# Patient Record
Sex: FEMALE | Race: WHITE | NOT HISPANIC OR LATINO | Employment: FULL TIME | URBAN - METROPOLITAN AREA
[De-identification: names, ages, dates, MRNs, and addresses within clinical notes are randomized per-mention and may not be internally consistent; named-entity substitution may affect disease eponyms.]

---

## 2018-04-18 ENCOUNTER — OFFICE VISIT (OUTPATIENT)
Dept: GASTROENTEROLOGY | Facility: CLINIC | Age: 28
End: 2018-04-18

## 2018-04-18 VITALS
HEIGHT: 67 IN | BODY MASS INDEX: 18.83 KG/M2 | WEIGHT: 120 LBS | SYSTOLIC BLOOD PRESSURE: 100 MMHG | DIASTOLIC BLOOD PRESSURE: 60 MMHG | TEMPERATURE: 98.2 F

## 2018-04-18 DIAGNOSIS — R14.0 ABDOMINAL BLOATING: ICD-10-CM

## 2018-04-18 DIAGNOSIS — R14.3 EXCESSIVE GAS: ICD-10-CM

## 2018-04-18 DIAGNOSIS — K59.00 CONSTIPATION, UNSPECIFIED CONSTIPATION TYPE: Primary | ICD-10-CM

## 2018-04-18 PROCEDURE — 99214 OFFICE O/P EST MOD 30 MIN: CPT | Performed by: NURSE PRACTITIONER

## 2018-04-18 RX ORDER — ALBUTEROL SULFATE 90 UG/1
2 AEROSOL, METERED RESPIRATORY (INHALATION)
COMMUNITY

## 2018-04-18 RX ORDER — DROSPIRENONE AND ETHINYL ESTRADIOL TABLETS 0.02-3(28)
KIT ORAL
COMMUNITY
Start: 2018-04-17 | End: 2021-06-09 | Stop reason: ALTCHOICE

## 2018-04-18 RX ORDER — CLINDAMYCIN PHOSPHATE 10 MG/G
GEL TOPICAL
COMMUNITY
Start: 2018-02-16 | End: 2019-10-10

## 2018-04-18 NOTE — PROGRESS NOTES
Chief Complaint   Patient presents with   • Irritable Bowel Syndrome   • Bloated   • Constipation       Jose Wadsworth is a  27 y.o. female here for a follow up visit for bloating and constipation.     HPI  28 yo f presents today for follow up visit for constipation, abd bloating and excessive gas. She is a patient of Dr. Viveros. She was last seen in the office on 4/2016 with the same issues. At that time she was advised to continue the miralax PRN and add more fiber and water. Patient admits the miralax is not working well. She is not taking any fiber supplementation. She is drinking more water. She feels that her diet is causing all her issues. She was doing better a few months ago but then all of her symptoms returned. She has thought about food allergy testing. She is having a BM daily with the miralax but she doesn't feel like shes 100% because she is still having a lot of bloating and gas. She denies any dysphagia, reflux, abd pain, N&V, rectal bleeding or melena. She admits her appetite is good and she has gained about 8 lbs since her last appt.     Past Medical History:   Diagnosis Date   • Allergic rhinitis    • Asthma    • Chronic constipation    • Vitamin B 12 deficiency    • Vitamin D deficiency        Past Surgical History:   Procedure Laterality Date   • APPENDECTOMY N/A 02/05/2016    Dr. Duong Johnson   • DENTAL PROCEDURE      x 2   • LASIK     • LEEP      LEEP PROCEDURE       Scheduled Meds:    Continuous Infusions:  No current facility-administered medications for this visit.     PRN Meds:.    Allergies   Allergen Reactions   • Metronidazole GI Intolerance   • Neomycin Rash   • Nickel Rash   • Other Rash     DISPERSE DYES       Social History     Social History   • Marital status: Single     Spouse name: N/A   • Number of children: N/A   • Years of education: N/A     Occupational History   • Not on file.     Social History Main Topics   • Smoking status: Never Smoker   • Smokeless tobacco:  Never Used   • Alcohol use Yes      Comment: WEEKENDS AND HOLIDAYS   • Drug use: No   • Sexual activity: Not on file     Other Topics Concern   • Not on file     Social History Narrative   • No narrative on file       Family History   Problem Relation Age of Onset   • Gallbladder disease Mother    • Crohn's disease Father        Review of Systems   Constitutional: Positive for unexpected weight change. Negative for appetite change, chills, diaphoresis, fatigue and fever.   HENT: Negative for nosebleeds, postnasal drip, sore throat, trouble swallowing and voice change.    Respiratory: Negative for cough, choking, chest tightness, shortness of breath and wheezing.    Cardiovascular: Negative for chest pain.   Gastrointestinal: Positive for abdominal distention and constipation. Negative for abdominal pain, anal bleeding, blood in stool, diarrhea, nausea, rectal pain and vomiting.   Endocrine: Negative for polydipsia, polyphagia and polyuria.   Musculoskeletal: Negative for gait problem.   Skin: Negative for rash and wound.   Allergic/Immunologic: Negative for food allergies.   Neurological: Negative for dizziness, speech difficulty and light-headedness.   Psychiatric/Behavioral: Negative for confusion, self-injury, sleep disturbance and suicidal ideas.       Vitals:    04/18/18 1444   BP: 100/60   Temp: 98.2 °F (36.8 °C)       Physical Exam   Constitutional: She is oriented to person, place, and time. She appears well-developed and well-nourished. She does not appear ill. No distress.   HENT:   Head: Normocephalic.   Eyes: Pupils are equal, round, and reactive to light.   Cardiovascular: Normal rate, regular rhythm and normal heart sounds.    Pulmonary/Chest: Effort normal and breath sounds normal.   Abdominal: Soft. Bowel sounds are normal. She exhibits no distension and no mass. There is no hepatosplenomegaly. There is no tenderness. There is no rebound and no guarding. No hernia.   Musculoskeletal: Normal range of  motion.   Neurological: She is alert and oriented to person, place, and time.   Skin: Skin is warm and dry.   Psychiatric: She has a normal mood and affect. Her speech is normal and behavior is normal. Judgment normal.       No images are attached to the encounter.    Assessment & Plan    1. Constipation, unspecified constipation type    2. Abdominal bloating    3. Excessive gas    I recommend she get food allergy tested. Continue the miralax but slowly add the fiber supplementation. Can keep a food journal also. Will give her samples of IBGARD/FDGARD to see if those offer any relief in her symptoms. Follow up with Dr. Viveros in 3 months. Call office in 2-3 weeks with update.

## 2018-05-10 ENCOUNTER — TELEPHONE (OUTPATIENT)
Dept: GASTROENTEROLOGY | Facility: CLINIC | Age: 28
End: 2018-05-10

## 2018-05-10 NOTE — TELEPHONE ENCOUNTER
"Call to pt. States is giving condition update as instructed with o/v of 4/18.  Stopped IBGard because has gelatin in it, and pt is vegetarian. Has been taking Miralax, fiber, and probiotic as instructed and not noting much change.    States has BM daily, but goes in small amounts and does not feel that completely empties. Occasional diarrhea since above regimen initiated.  Frustrated because \"really bloated all the time\".    Advise pt that M Pepper out of office  - will send update.  Pt verb understanding.  "

## 2018-05-10 NOTE — TELEPHONE ENCOUNTER
----- Message from Phuong De Los Santos sent at 5/10/2018 10:06 AM EDT -----  Regarding: FIORELLA  Contact: [phone]  PT CALLED STATED SOMEWHAT BETTER..

## 2018-05-11 NOTE — TELEPHONE ENCOUNTER
Call to pt.  Advise per M Pepper that if taking Miralax daily and still not satisfied with results, can increase the Miralax BID or try some Linzess 72 mcg daily or every other day instead of the miralax and see if that helps better.    Pt verb understanding and states will try Linzess.  Advise that 2 boxes at  to trial - update this office with response.  Verb understanding.

## 2018-05-11 NOTE — TELEPHONE ENCOUNTER
I would make sure the miralax is daily and so is the fiber and probiotic. Make sure she is drinking a lot of water and exercising daily to help move her bowels. If her bowels get regular and she is still bloating and gassy then we can always check her for SIBO if she hasnt been recently. Thanks.

## 2018-05-11 NOTE — TELEPHONE ENCOUNTER
"Call to pt.  Advise per M Pepper to make sure that taking Miralax, fiber and probiotic daily.  Also drinking a lot of water and exercising daily to help move bowels.  If bowels get regular and still bloating and gassy, can also check for SIBO.    Pt verb understanding.  States is indeed taking Miralax, fiber and probiotic daily as well as exercise and water.      Frustrated because has been dealing with this \"for months\", and has been doing all of the above (recently added Miralax)  Is passing stool, but in small amounts and does not feel that empties.  Persistent bloating.  Asking how to manage symptoms.      Question to M Pepper.  "

## 2018-05-11 NOTE — TELEPHONE ENCOUNTER
If she is taking miralax daily and still not satisfied with the results she can increase the MIRALAX to BID or try some linzess 72 mcg daily or every other day instead of the miralax and see if that helps her better. Thanks.

## 2018-05-17 ENCOUNTER — TELEPHONE (OUTPATIENT)
Dept: GASTROENTEROLOGY | Facility: CLINIC | Age: 28
End: 2018-05-17

## 2018-05-17 NOTE — TELEPHONE ENCOUNTER
Called pt and pt reports that she has been having loads of issues with bloating and constipation.  She states she took her first dose of linzess then later developed watery diarrhea .  Pt states she did not take the capsule she took the medication out of the tablet ( pt is vegetarian and does not take the capsule due to it is made out of gelatin).  Pt reports she followed the direction on the medication and mixed it with water.  She states she later went for a walk and had blood in her underwear and when she wiped after her next bm she states she saw bright red blood mixed with stool.  She states she feels fine.  Pt is asking what can she do or take.  Advised pt to hold linzess for now and in the meantime will send message to Althea VILLAVICENCIO.  Advised pt if her symptoms worsen to seek medical attn. Pt verb understanding and reports she is feeling fine.

## 2018-05-17 NOTE — TELEPHONE ENCOUNTER
OK I would have her stop the linzess and let her body rest from the Linzess side effects.     My guess is the Linzess 72 was just too strong for her and caused irritation and bleeding. I would have her drinking lots of water and wait a few days before trying something else.     I would have her try the trulance. You can give her samples. Its works more like the miralax but stronger. It tends to be gentler than the linzess     the Linzess 72 is the lowest and most gentle of the 3 Linzess doses.     Have her call us Monday with update and we can give her some samples of Trulance to try maybe starting Monday and see how she does. Thanks.

## 2018-05-17 NOTE — TELEPHONE ENCOUNTER
Called pt and advised per Althea to stop the linzess and let her body rest from the side effects of the linzess.     Her guess is the linzess 72 was just too strong for her and caused irritation and bleeding. She wants her to drink lots of water and wait a few days before trying something else.      She would have her try trulance .  It works more like miralax but stronger .  It tends to be gentler than the linzess.  Advised pt to call Monday with an update and we will have 2 boxes of samples at the  for her. Pt verb understanding.

## 2018-05-17 NOTE — TELEPHONE ENCOUNTER
----- Message from Phuong De Los Santos sent at 5/17/2018  2:52 PM EDT -----  Regarding: linzess   Contact: [phone]  PT CALLED COMPLAIN OF PASSING BLOOD

## 2018-05-18 ENCOUNTER — HOSPITAL ENCOUNTER (EMERGENCY)
Facility: HOSPITAL | Age: 28
Discharge: HOME | End: 2018-05-18
Attending: EMERGENCY MEDICINE | Admitting: EMERGENCY MEDICINE

## 2018-05-18 ENCOUNTER — APPOINTMENT (OUTPATIENT)
Dept: GENERAL RADIOLOGY | Facility: HOSPITAL | Age: 28
End: 2018-05-18

## 2018-05-18 VITALS
DIASTOLIC BLOOD PRESSURE: 96 MMHG | WEIGHT: 117 LBS | HEIGHT: 67 IN | OXYGEN SATURATION: 97 % | RESPIRATION RATE: 18 BRPM | BODY MASS INDEX: 18.36 KG/M2 | HEART RATE: 79 BPM | SYSTOLIC BLOOD PRESSURE: 140 MMHG | TEMPERATURE: 98.7 F

## 2018-05-18 DIAGNOSIS — S20.312A ABRASION OF LEFT CHEST WALL, INITIAL ENCOUNTER: ICD-10-CM
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
## 2018-05-18 PROCEDURE — 99283 EMERGENCY DEPT VISIT LOW MDM: CPT

## 2018-05-18 PROCEDURE — 71101 X-RAY EXAM UNILAT RIBS/CHEST: CPT

## 2018-05-18 RX ORDER — SACCHAROMYCES BOULARDII 250 MG
250 CAPSULE ORAL 2 TIMES DAILY
COMMUNITY
End: 2020-01-22
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
## 2018-05-21 ENCOUNTER — TELEPHONE (OUTPATIENT)
Dept: SOCIAL WORK | Facility: HOSPITAL | Age: 28
End: 2018-05-21

## 2018-05-21 ENCOUNTER — TELEPHONE (OUTPATIENT)
Dept: GASTROENTEROLOGY | Facility: CLINIC | Age: 28
End: 2018-05-21

## 2018-05-21 NOTE — TELEPHONE ENCOUNTER
----- Message from Patricia Teixeira sent at 5/21/2018  3:10 PM EDT -----  Regarding: pt called with update   Contact: [phone]  Merna told pt to stop taking the linzess & the bleeding has stopped.

## 2018-05-21 NOTE — TELEPHONE ENCOUNTER
ED f/u phone call; states she is taking Advil and using ice for pain w/ some relief; has already followed up w/ PCP. No questions/concerns

## 2018-05-21 NOTE — TELEPHONE ENCOUNTER
Called pt and pt reports that the rectal bleeding has stopped.  She has not had a chance to  the trulance yet.  Advised would update Althea VILLAVICENCIO.

## 2018-05-25 ENCOUNTER — TELEPHONE (OUTPATIENT)
Dept: GASTROENTEROLOGY | Facility: CLINIC | Age: 28
End: 2018-05-25

## 2018-05-25 NOTE — TELEPHONE ENCOUNTER
Call to pt.   Advise per M Pepper that should be ok to take all of them together.  Can always double check with pharmacist.  PT verb understanding.

## 2018-05-25 NOTE — TELEPHONE ENCOUNTER
Yes it should be ok to take all of them together. She can always double check with her pharmacist. Thanks.

## 2018-05-25 NOTE — TELEPHONE ENCOUNTER
Call to pt. States has been taking Trulance.  Recently started on Cyclobenzaprine and prescription strength Ibuprofen.  Asking if ok to take these with Trulance.    Advise to check with Pharmacist - pt requests to also check with JIM Lorenzana

## 2018-05-25 NOTE — TELEPHONE ENCOUNTER
----- Message from Patricia Teixeira sent at 5/24/2018  3:40 PM EDT -----  Regarding: PT CALLED WITH MEDICATION QUESTIONS   Contact: [phone]  ....

## 2018-07-23 ENCOUNTER — OFFICE VISIT (OUTPATIENT)
Dept: GASTROENTEROLOGY | Facility: CLINIC | Age: 28
End: 2018-07-23

## 2018-07-23 VITALS
WEIGHT: 109.4 LBS | HEIGHT: 67 IN | BODY MASS INDEX: 17.17 KG/M2 | SYSTOLIC BLOOD PRESSURE: 112 MMHG | TEMPERATURE: 97.8 F | DIASTOLIC BLOOD PRESSURE: 70 MMHG

## 2018-07-23 DIAGNOSIS — R14.0 BLOATING: ICD-10-CM

## 2018-07-23 DIAGNOSIS — R10.13 DYSPEPSIA: Primary | ICD-10-CM

## 2018-07-23 LAB
ALBUMIN SERPL-MCNC: 4.3 G/DL (ref 3.5–5.2)
ALBUMIN/GLOB SERPL: 2 G/DL
ALP SERPL-CCNC: 62 U/L (ref 39–117)
ALT SERPL-CCNC: 15 U/L (ref 1–33)
AST SERPL-CCNC: 16 U/L (ref 1–32)
BASOPHILS # BLD AUTO: 0.05 10*3/MM3 (ref 0–0.2)
BASOPHILS NFR BLD AUTO: 0.7 % (ref 0–1.5)
BILIRUB SERPL-MCNC: 0.2 MG/DL (ref 0.1–1.2)
BUN SERPL-MCNC: 7 MG/DL (ref 6–20)
BUN/CREAT SERPL: 9.2 (ref 7–25)
CALCIUM SERPL-MCNC: 9.6 MG/DL (ref 8.6–10.5)
CHLORIDE SERPL-SCNC: 105 MMOL/L (ref 98–107)
CO2 SERPL-SCNC: 21.7 MMOL/L (ref 22–29)
CREAT SERPL-MCNC: 0.76 MG/DL (ref 0.57–1)
EOSINOPHIL # BLD AUTO: 0.14 10*3/MM3 (ref 0–0.7)
EOSINOPHIL NFR BLD AUTO: 2 % (ref 0.3–6.2)
ERYTHROCYTE [DISTWIDTH] IN BLOOD BY AUTOMATED COUNT: 13.4 % (ref 11.7–13)
GLOBULIN SER CALC-MCNC: 2.1 GM/DL
GLUCOSE SERPL-MCNC: 87 MG/DL (ref 65–99)
HCT VFR BLD AUTO: 40.8 % (ref 35.6–45.5)
HGB BLD-MCNC: 13.1 G/DL (ref 11.9–15.5)
IMM GRANULOCYTES # BLD: 0 10*3/MM3 (ref 0–0.03)
IMM GRANULOCYTES NFR BLD: 0 % (ref 0–0.5)
LIPASE SERPL-CCNC: 54 U/L (ref 13–60)
LYMPHOCYTES # BLD AUTO: 2.39 10*3/MM3 (ref 0.9–4.8)
LYMPHOCYTES NFR BLD AUTO: 34.6 % (ref 19.6–45.3)
MCH RBC QN AUTO: 32.5 PG (ref 26.9–32)
MCHC RBC AUTO-ENTMCNC: 32.1 G/DL (ref 32.4–36.3)
MCV RBC AUTO: 101.2 FL (ref 80.5–98.2)
MONOCYTES # BLD AUTO: 0.5 10*3/MM3 (ref 0.2–1.2)
MONOCYTES NFR BLD AUTO: 7.2 % (ref 5–12)
NEUTROPHILS # BLD AUTO: 3.82 10*3/MM3 (ref 1.9–8.1)
NEUTROPHILS NFR BLD AUTO: 55.5 % (ref 42.7–76)
PLATELET # BLD AUTO: 263 10*3/MM3 (ref 140–500)
POTASSIUM SERPL-SCNC: 3.9 MMOL/L (ref 3.5–5.2)
PROT SERPL-MCNC: 6.4 G/DL (ref 6–8.5)
RBC # BLD AUTO: 4.03 10*6/MM3 (ref 3.9–5.2)
SODIUM SERPL-SCNC: 144 MMOL/L (ref 136–145)
WBC # BLD AUTO: 6.9 10*3/MM3 (ref 4.5–10.7)

## 2018-07-23 PROCEDURE — 99214 OFFICE O/P EST MOD 30 MIN: CPT | Performed by: INTERNAL MEDICINE

## 2018-07-23 RX ORDER — OMEPRAZOLE 40 MG/1
40 CAPSULE, DELAYED RELEASE ORAL DAILY
Qty: 30 CAPSULE | Refills: 2 | Status: SHIPPED | OUTPATIENT
Start: 2018-07-23 | End: 2018-12-03 | Stop reason: SDUPTHER

## 2018-07-23 NOTE — PROGRESS NOTES
Chief Complaint   Patient presents with   • Bloated       Jose Wadsworth is a  27 y.o. female here for a follow up visit for abdominal bloating. She does not feel any better.  She tried linzess - it caused diarrhea and seepage.  She tried IBgard and FDgard and she thought she was no better.  She has regular BMs but sense of incomplete evacuation. She also tried trulance and this gave her indigestion.   She feels bloated and this is causing her significant amount of distress.  HPI  Past Medical History:   Diagnosis Date   • Allergic rhinitis    • Asthma    • Chronic constipation    • Vitamin B 12 deficiency    • Vitamin D deficiency      Past Surgical History:   Procedure Laterality Date   • APPENDECTOMY N/A 02/05/2016    Dr. Duong Johnson   • DENTAL PROCEDURE      x 2   • LASIK     • LEEP      LEEP PROCEDURE       Current Outpatient Prescriptions:   •  albuterol (PROVENTIL HFA;VENTOLIN HFA) 108 (90 Base) MCG/ACT inhaler, Inhale 2 puffs., Disp: , Rfl:   •  Ascorbic Acid (VITAMIN C PO), Take  by mouth., Disp: , Rfl:   •  B Complex Vitamins (VITAMIN B-COMPLEX PO), Take  by mouth., Disp: , Rfl:   •  Cetirizine HCl (ZYRTEC ALLERGY PO), Take by mouth., Disp: , Rfl:   •  Cholecalciferol (VITAMIN D3 PO), Take  by mouth., Disp: , Rfl:   •  Fluticasone Furoate-Vilanterol (BREO ELLIPTA) 200-25 MCG/INH aerosol powder , , Disp: , Rfl:   •  LORYNA 3-0.02 MG per tablet, , Disp: , Rfl:   •  polyethylene glycol (MIRALAX) packet, Take 17 g by mouth daily., Disp: , Rfl:   •  Probiotic Product (PROBIOTIC PO), Take  by mouth., Disp: , Rfl:   •  clindamycin (CLINDAGEL) 1 % gel, , Disp: , Rfl:   •  Ergocalciferol (VITAMIN D2 PO), Take by mouth., Disp: , Rfl:   •  omeprazole (PRILOSEC) 40 MG capsule, Take 1 capsule by mouth Daily., Disp: 30 capsule, Rfl: 2  •  saccharomyces boulardii (FLORASTOR) 250 MG capsule, Take 250 mg by mouth 2 (Two) Times a Day., Disp: , Rfl:   PRN Meds:.  Allergies   Allergen Reactions   • Metronidazole GI  Intolerance   • Neomycin Rash   • Nickel Rash   • Other Rash     DISPERSE DYES     Social History     Social History   • Marital status: Single     Spouse name: N/A   • Number of children: N/A   • Years of education: N/A     Occupational History   • Not on file.     Social History Main Topics   • Smoking status: Never Smoker   • Smokeless tobacco: Never Used   • Alcohol use Yes      Comment: WEEKENDS AND HOLIDAYS   • Drug use: No   • Sexual activity: Not on file     Other Topics Concern   • Not on file     Social History Narrative   • No narrative on file     Family History   Problem Relation Age of Onset   • Gallbladder disease Mother    • Crohn's disease Father      Review of Systems   Constitutional: Negative for appetite change and unexpected weight change.   Gastrointestinal: Positive for abdominal distention and abdominal pain. Negative for nausea and vomiting.   All other systems reviewed and are negative.    Vitals:    07/23/18 0831   BP: 112/70   Temp: 97.8 °F (36.6 °C)     1    07/23/18  0831   Weight: 49.6 kg (109 lb 6.4 oz)     Physical Exam   Constitutional: She appears well-developed and well-nourished.   HENT:   Head: Normocephalic and atraumatic.   Eyes: No scleral icterus.   Abdominal: Soft. She exhibits no distension and no mass. There is no tenderness.   Neurological: She is alert.   Skin: Skin is warm and dry.   Psychiatric: She has a normal mood and affect.     No images are attached to the encounter.  Diagnoses and all orders for this visit:    Dyspepsia  -     CBC & Differential  -     Comprehensive Metabolic Panel  -     Lipase  -     Helicobacter Pylori, IgA IgG IgM    Bloating    Other orders  -     Cholecalciferol (VITAMIN D3 PO); Take  by mouth.  -     Probiotic Product (PROBIOTIC PO); Take  by mouth.  -     Ascorbic Acid (VITAMIN C PO); Take  by mouth.  -     B Complex Vitamins (VITAMIN B-COMPLEX PO); Take  by mouth.  -     omeprazole (PRILOSEC) 40 MG capsule; Take 1 capsule by mouth  Daily.    Plan-  - Check labs today  - Trial PPI  - Consider trial of gluten-free diet-also discussed FODMAP diet-patient will call with progress report

## 2018-07-24 LAB
H PYLORI IGA SER-ACNC: <9 UNITS (ref 0–8.9)
H PYLORI IGG SER IA-ACNC: 0.12 INDEX VALUE (ref 0–0.79)
H PYLORI IGM SER-ACNC: <9 UNITS (ref 0–8.9)

## 2018-07-25 ENCOUNTER — TELEPHONE (OUTPATIENT)
Dept: GASTROENTEROLOGY | Facility: CLINIC | Age: 28
End: 2018-07-25

## 2018-07-25 DIAGNOSIS — R14.0 BLOATING: Primary | ICD-10-CM

## 2018-07-25 NOTE — TELEPHONE ENCOUNTER
Please let her know that her recent labs were normal but her red blood cells are larger than average.  This is sometimes indicative of a B12 or folate deficiency.  I would like to check these labs at her convenience.  Please have her come in when she can 4 B12 and folate.

## 2018-07-25 NOTE — TELEPHONE ENCOUNTER
Called pt and advised per Dr Viveros that her recent labs were normal, but her rbc's are larger than average.  This is sometimes indicatibe of a b12 or folate def.  She would like her to have labs to check this out.  Pt verb understanding and made lab appt for Monday 07/30 at 9am.

## 2018-07-30 LAB
FOLATE SERPL-MCNC: 14.32 NG/ML (ref 4.78–24.2)
VIT B12 SERPL-MCNC: 740 PG/ML (ref 211–946)

## 2018-08-02 ENCOUNTER — TELEPHONE (OUTPATIENT)
Dept: GASTROENTEROLOGY | Facility: CLINIC | Age: 28
End: 2018-08-02

## 2018-08-02 DIAGNOSIS — R10.13 DYSPEPSIA: Primary | ICD-10-CM

## 2018-08-02 NOTE — TELEPHONE ENCOUNTER
----- Message from Jose Wadsworth sent at 8/2/2018  2:35 PM EDT -----  Regarding: Test Results Question  Hi Dr. Viveros,    I saw that the results of the B12 and folate tests came back as normal.  Is there anything else I need to do in regard to the abnormal results within the CBC? I don’t know if it’s relevant, but I thought I should also mention that there is a significant history of hypothyroidism on both sides of my family. I also have the results of CBCs ordered by Dr. Mari Long in 2015 and 2016, if they aren’t in your system and could be of any use.    Regards,  Jose

## 2018-08-04 NOTE — TELEPHONE ENCOUNTER
It would certainly be worthwhile to check your thyroid given these abnormalities and your symptoms - pls have her come in for additional bloodwork if she is agreeable - TSH

## 2018-08-08 NOTE — TELEPHONE ENCOUNTER
Call to pt.  Advise per Dr Viveros that would certainly be worthwhile to check thyroid given these abn and symptoms - come in for additional bloodwork.     Pt verb understanding.  Lab appt scheduled for 8/9 @ 10am.  Order placed for TSH -message to Dr Viveros.    Pt states has not yet heard back with f/u appt with Dr Viveros as instructed with o/v of 7/23 - message to Manager.

## 2018-08-09 ENCOUNTER — PRIOR AUTHORIZATION (OUTPATIENT)
Dept: GASTROENTEROLOGY | Facility: CLINIC | Age: 28
End: 2018-08-09

## 2018-08-09 LAB — TSH SERPL DL<=0.005 MIU/L-ACNC: 2.96 MIU/ML (ref 0.27–4.2)

## 2018-08-10 ENCOUNTER — TELEPHONE (OUTPATIENT)
Dept: GASTROENTEROLOGY | Facility: CLINIC | Age: 28
End: 2018-08-10

## 2018-08-10 NOTE — TELEPHONE ENCOUNTER
Full thyroid panel not indicated unless tsh abnormal - would recommend that she discuss whether any additional testing needed for elevated mcv with her pcp

## 2018-08-10 NOTE — TELEPHONE ENCOUNTER
----- Message from Phuong De Los Santos sent at 8/10/2018  9:13 AM EDT -----  Regarding: LABS   Contact: [phone]  PT CALLED WITH MORE QUESTIONS ABOUT LABS

## 2018-08-10 NOTE — TELEPHONE ENCOUNTER
Pt called and advised per Dr Viveros that full thyroid panel not indicated unless tsh abnormal- would recommend that she discuss whether any additional testing for elevated mcv with her pcp. Pt verb understanding.

## 2018-08-10 NOTE — TELEPHONE ENCOUNTER
Call from pt.  States reviewed lab results in MyChart -  asking why only TSH was done and not thyroid panel. Advise that this what ordered.    Asking how to f/u re: abn results from CBC (see note of 8/2).    Message to DR Viveros.

## 2018-08-29 ENCOUNTER — TELEPHONE (OUTPATIENT)
Dept: GASTROENTEROLOGY | Facility: CLINIC | Age: 28
End: 2018-08-29

## 2018-08-29 NOTE — TELEPHONE ENCOUNTER
----- Message from Cha Viveros MD sent at 8/10/2018  9:01 AM EDT -----  Thyroid labs were normal - she may want to discuss with her pcp whether any other testing warranted for elevated mcv

## 2018-09-06 ENCOUNTER — OFFICE VISIT (OUTPATIENT)
Dept: GASTROENTEROLOGY | Facility: CLINIC | Age: 28
End: 2018-09-06

## 2018-09-06 VITALS
BODY MASS INDEX: 16.57 KG/M2 | DIASTOLIC BLOOD PRESSURE: 62 MMHG | SYSTOLIC BLOOD PRESSURE: 82 MMHG | TEMPERATURE: 98.6 F | HEIGHT: 67 IN | WEIGHT: 105.6 LBS

## 2018-09-06 DIAGNOSIS — K59.00 CONSTIPATION, UNSPECIFIED CONSTIPATION TYPE: ICD-10-CM

## 2018-09-06 DIAGNOSIS — K30 FUNCTIONAL DYSPEPSIA: Primary | ICD-10-CM

## 2018-09-06 DIAGNOSIS — R14.0 BLOATING: ICD-10-CM

## 2018-09-06 PROCEDURE — 99214 OFFICE O/P EST MOD 30 MIN: CPT | Performed by: INTERNAL MEDICINE

## 2018-09-06 NOTE — PROGRESS NOTES
"Chief Complaint   Patient presents with   • Follow-up   • Dyspepsia       Jose Wadsworth is a  27 y.o. female here for a follow up visit for dyspepsia.  She had difficulty obtaining the omeprazole since her last visit.  However we received notification from the insurance company that it is approved through August 2019.  I discussed this with the patient.  She tried over-the-counter omeprazole 20 mg-she took 2 pills daily for 1 week.  She did not notice any appreciable improvement so she stopped taking it.  She is try to cut out dairy.  She states that when she reintroduces dairy she has increasing symptoms-by increasing dairy she reports that she is increasing things made with very such as a muffin.  She is a vegetarian and at this point is having a very difficult time finding anything that she can eat.  She has cut out things in the past that she feels like causes symptoms which is left with a very limited diet.  She describes herself as \"miserable\" at this point.  She is also unhappy with her job and would like to move but feels like she can't because of her health issues.    Workup to date includes a CT scan in 2015 which prompted an appendectomy.  Symptoms are not improved thereafter.  She had a gallbladder ultrasound which was normal and a HIDA scan with an ejection fraction of 93%.    She reports difficulty evacuating her bowels.  She takes MiraLAX most does help her have a bowel movement but she feels like it just makes her very soft and she still doesn't completely empty.  She's had negative H. pylori testing.  She has had a negative celiac panel.    She has tried Linzess and trulance without adequate results for her constipation.      HPI  Past Medical History:   Diagnosis Date   • Allergic rhinitis    • Asthma    • Chronic constipation    • Vitamin B 12 deficiency    • Vitamin D deficiency      Past Surgical History:   Procedure Laterality Date   • APPENDECTOMY N/A 02/05/2016    Dr. Duong Johnson   • " DENTAL PROCEDURE      x 2   • LASIK     • LEEP      LEEP PROCEDURE       Current Outpatient Prescriptions:   •  albuterol (PROVENTIL HFA;VENTOLIN HFA) 108 (90 Base) MCG/ACT inhaler, Inhale 2 puffs., Disp: , Rfl:   •  Ascorbic Acid (VITAMIN C PO), Take  by mouth., Disp: , Rfl:   •  B Complex Vitamins (VITAMIN B-COMPLEX PO), Take  by mouth., Disp: , Rfl:   •  Cetirizine HCl (ZYRTEC ALLERGY PO), Take by mouth., Disp: , Rfl:   •  Cholecalciferol (VITAMIN D3 PO), Take  by mouth., Disp: , Rfl:   •  clindamycin (CLINDAGEL) 1 % gel, , Disp: , Rfl:   •  Ergocalciferol (VITAMIN D2 PO), Take by mouth., Disp: , Rfl:   •  Fluticasone Furoate-Vilanterol (BREO ELLIPTA) 200-25 MCG/INH aerosol powder , , Disp: , Rfl:   •  LORYNA 3-0.02 MG per tablet, , Disp: , Rfl:   •  omeprazole (PRILOSEC) 40 MG capsule, Take 1 capsule by mouth Daily., Disp: 30 capsule, Rfl: 2  •  polyethylene glycol (MIRALAX) packet, Take 17 g by mouth daily., Disp: , Rfl:   •  Probiotic Product (PROBIOTIC PO), Take  by mouth., Disp: , Rfl:   •  saccharomyces boulardii (FLORASTOR) 250 MG capsule, Take 250 mg by mouth 2 (Two) Times a Day., Disp: , Rfl:   PRN Meds:.  Allergies   Allergen Reactions   • Metronidazole GI Intolerance   • Neomycin Rash   • Nickel Rash   • Other Rash     DISPERSE DYES     Social History     Social History   • Marital status: Single     Spouse name: N/A   • Number of children: N/A   • Years of education: N/A     Occupational History   • Not on file.     Social History Main Topics   • Smoking status: Never Smoker   • Smokeless tobacco: Never Used   • Alcohol use Yes      Comment: WEEKENDS AND HOLIDAYS   • Drug use: No   • Sexual activity: Not on file     Other Topics Concern   • Not on file     Social History Narrative   • No narrative on file     Family History   Problem Relation Age of Onset   • Gallbladder disease Mother    • Crohn's disease Father      Review of Systems   Constitutional: Negative for appetite change and unexpected  weight change.   Gastrointestinal: Positive for abdominal distention and constipation. Negative for blood in stool and nausea.   Psychiatric/Behavioral: Positive for dysphoric mood.   All other systems reviewed and are negative.    Vitals:    09/06/18 1157   BP: (!) 82/62   Temp: 98.6 °F (37 °C)     1    09/06/18  1157   Weight: 47.9 kg (105 lb 9.6 oz)     Physical Exam   Constitutional: She appears well-developed and well-nourished.   HENT:   Head: Normocephalic and atraumatic.   Eyes: No scleral icterus.   Pulmonary/Chest: Effort normal.   Abdominal: She exhibits no distension.   Neurological: She is alert.   Skin: Skin is warm and dry.   Psychiatric:   tearful     No images are attached to the encounter.  Diagnoses and all orders for this visit:    Functional dyspepsia    Constipation, unspecified constipation type    Bloating    Plan-  Her diet is restricted at this point I think that trying to cut out gluten would only make her more miserable.  At this point I think we should focus on reintroducing foods and finding things that she can eat.  She has cut out all dairy and I've asked her to reintroduce hard cheeses to see if she can tolerate this.  She is a vegetarian.  She will then try to reintroduce things like yogurt if she tolerates the hard cheeses.  After that we will try nuts which she has also cut out.    Have asked her to really commit to a 6 week trial of a proton pump inhibitor to see if this will help her symptoms.    If she does not improve with a PPI, would consider hydrogen breath testing to evaluate for small bowel bacterial overgrowth versus empiric trial of antibiotics.  Could also consider additional imaging given the chronicity of her symptoms with an upper GI and small bowel follow-through.  If this is negative I would consider trial of TCA

## 2018-10-29 ENCOUNTER — TELEPHONE (OUTPATIENT)
Dept: GASTROENTEROLOGY | Facility: CLINIC | Age: 28
End: 2018-10-29

## 2018-10-29 NOTE — TELEPHONE ENCOUNTER
----- Message from Patricia Teixeira sent at 10/29/2018  1:34 PM EDT -----  Regarding: pt called   Contact: [phone]  Pt is calling stating nothing has improved & is asking for a call back.

## 2018-10-29 NOTE — TELEPHONE ENCOUNTER
Call to pt.  Is updating condition as instructed with o/v of 9/6/18.   has been taking omeprazole 40 mg daily as instructed with relief of abd pain.  However, continues to experience bloating and constipation.    States has tried to advance diet as instructed with mixed results.  Cannot eat hard cheese or nuts very often because causes constipation.    Asking if should proceed with hydrogen breath testing.    Update/question to DR Viveros.

## 2018-11-30 ENCOUNTER — TELEPHONE (OUTPATIENT)
Dept: GASTROENTEROLOGY | Facility: CLINIC | Age: 28
End: 2018-11-30

## 2018-12-03 ENCOUNTER — OFFICE VISIT (OUTPATIENT)
Dept: GASTROENTEROLOGY | Facility: CLINIC | Age: 28
End: 2018-12-03

## 2018-12-03 VITALS
TEMPERATURE: 97.8 F | BODY MASS INDEX: 17.11 KG/M2 | WEIGHT: 109 LBS | DIASTOLIC BLOOD PRESSURE: 66 MMHG | SYSTOLIC BLOOD PRESSURE: 96 MMHG | HEIGHT: 67 IN

## 2018-12-03 DIAGNOSIS — R14.0 BLOATING: ICD-10-CM

## 2018-12-03 DIAGNOSIS — R10.84 GENERALIZED ABDOMINAL PAIN: Primary | ICD-10-CM

## 2018-12-03 DIAGNOSIS — R19.4 ALTERED BOWEL HABITS: ICD-10-CM

## 2018-12-03 PROCEDURE — 99214 OFFICE O/P EST MOD 30 MIN: CPT | Performed by: INTERNAL MEDICINE

## 2018-12-03 RX ORDER — FLUTICASONE PROPIONATE 50 MCG
SPRAY, SUSPENSION (ML) NASAL
COMMUNITY
Start: 2018-09-01

## 2018-12-03 RX ORDER — MELOXICAM 15 MG/1
15 TABLET ORAL DAILY
COMMUNITY
End: 2019-10-10

## 2018-12-03 NOTE — PROGRESS NOTES
Chief Complaint   Patient presents with   • Dyspepsia       Jose Wadsworth is a  28 y.o. female here for a follow up visit for abdominal pain and bloating.  She has had less pain but continues to have bloating.  She did notice some improvement with PPI and she is still taking ot.  She takes miralax every other day - daily makes everything too soft.  No blood in the stool.  She has a sense of incomplete evacuation.  Weight stable.  She has continued to avoid too much dairy.  She has had rare nausea - happens when she is very bloated and this is new.    She has had a recent SIBO test which was negative. She has had symptoms since 10/15.     Workup to date includes a CT scan in 2015 which prompted an appendectomy.  Symptoms are not improved thereafter.  She had a gallbladder ultrasound which was normal and a HIDA scan with an ejection fraction of 93%.    She's had negative H. pylori testing.  She has had a negative celiac panel.    She has tried Linzess and trulance without adequate results for her constipation.    HPI  Past Medical History:   Diagnosis Date   • Allergic rhinitis    • Asthma    • Chronic constipation    • Irritable bowel syndrome 2013   • Vitamin B 12 deficiency    • Vitamin D deficiency      Past Surgical History:   Procedure Laterality Date   • APPENDECTOMY N/A 02/05/2016    Dr. Duong Johnson   • DENTAL PROCEDURE      x 2   • LASIK     • LEEP      LEEP PROCEDURE       Current Outpatient Medications:   •  albuterol (PROVENTIL HFA;VENTOLIN HFA) 108 (90 Base) MCG/ACT inhaler, Inhale 2 puffs., Disp: , Rfl:   •  Cetirizine HCl (ZYRTEC ALLERGY PO), Take by mouth., Disp: , Rfl:   •  Cholecalciferol (VITAMIN D3 PO), Take  by mouth., Disp: , Rfl:   •  [employer] B-12 1000 MCG tablet controlled-release, Take 1 tablet by mouth Daily., Disp: , Rfl: 11  •  Ergocalciferol (VITAMIN D2 PO), Take by mouth., Disp: , Rfl:   •  fluticasone (FLONASE ALLERGY RELIEF) 50 MCG/ACT nasal spray, , Disp: , Rfl:   •  Fluticasone  Furoate-Vilanterol (BREO ELLIPTA) 200-25 MCG/INH aerosol powder , , Disp: , Rfl:   •  LORYNA 3-0.02 MG per tablet, , Disp: , Rfl:   •  meloxicam (MOBIC) 15 MG tablet, Take 15 mg by mouth Daily., Disp: , Rfl:   •  omeprazole (PRILOSEC) 40 MG capsule, Take 1 capsule by mouth Daily., Disp: 30 capsule, Rfl: 2  •  polyethylene glycol (MIRALAX) packet, Take 17 g by mouth daily., Disp: , Rfl:   •  Probiotic Product (PROBIOTIC PO), Take  by mouth., Disp: , Rfl:   •  Ascorbic Acid (VITAMIN C PO), Take  by mouth., Disp: , Rfl:   •  B Complex Vitamins (VITAMIN B-COMPLEX PO), Take  by mouth., Disp: , Rfl:   •  clindamycin (CLINDAGEL) 1 % gel, , Disp: , Rfl:   •  saccharomyces boulardii (FLORASTOR) 250 MG capsule, Take 250 mg by mouth 2 (Two) Times a Day., Disp: , Rfl:   PRN Meds:.  Allergies   Allergen Reactions   • Metronidazole GI Intolerance   • Neomycin Rash   • Nickel Rash   • Other Rash     DISPERSE DYES     Social History     Socioeconomic History   • Marital status: Single     Spouse name: Not on file   • Number of children: Not on file   • Years of education: Not on file   • Highest education level: Not on file   Social Needs   • Financial resource strain: Not on file   • Food insecurity - worry: Not on file   • Food insecurity - inability: Not on file   • Transportation needs - medical: Not on file   • Transportation needs - non-medical: Not on file   Occupational History   • Not on file   Tobacco Use   • Smoking status: Never Smoker   • Smokeless tobacco: Never Used   Substance and Sexual Activity   • Alcohol use: Yes     Comment: WEEKENDS AND HOLIDAYS   • Drug use: No   • Sexual activity: Not Currently   Other Topics Concern   • Not on file   Social History Narrative   • Not on file     Family History   Problem Relation Age of Onset   • Gallbladder disease Mother    • Irritable bowel syndrome Mother    • Crohn's disease Father      Review of Systems   Constitutional: Negative for appetite change and unexpected  weight change.   Gastrointestinal: Positive for abdominal distention, abdominal pain and constipation. Negative for blood in stool.   All other systems reviewed and are negative.    Vitals:    12/03/18 0831   BP: 96/66   Temp: 97.8 °F (36.6 °C)         12/03/18  0831   Weight: 49.4 kg (109 lb)     Physical Exam   Constitutional: She appears well-developed and well-nourished.   HENT:   Head: Normocephalic and atraumatic.   Eyes: No scleral icterus.   Abdominal: Soft. She exhibits no distension and no mass. There is no tenderness.   Mild diffuse tenderness   Neurological: She is alert.   Skin: Skin is warm and dry.   Psychiatric: She has a normal mood and affect.     No images are attached to the encounter.  Diagnoses and all orders for this visit:    Generalized abdominal pain  -     Case Request; Standing  -     Case Request    Bloating  -     Case Request; Standing  -     Case Request    Altered bowel habits  -     Case Request; Standing  -     Case Request    Other orders  -     meloxicam (MOBIC) 15 MG tablet; Take 15 mg by mouth Daily.  -     fluticasone (FLONASE ALLERGY RELIEF) 50 MCG/ACT nasal spray;   -     CVS B-12 1000 MCG tablet controlled-release; Take 1 tablet by mouth Daily.  -     Follow Anesthesia Guidelines / Standing Orders; Future  -     Implement Anesthesia orders day of procedure.; Standing  -     Obtain informed consent; Standing  -     Verify bowel prep was successful; Standing  -     Give tap water enema if bowel prep was insufficient; Standing    Plan:  - she has not shown consistent improvement with conservative management - recommend endoscopic eval for further workup.  Continue PPI daily for now, miralax qod.  Further recs based on evaluation results. May consider trial TCA if negative.

## 2018-12-05 RX ORDER — OMEPRAZOLE 40 MG/1
CAPSULE, DELAYED RELEASE ORAL
Qty: 30 CAPSULE | Refills: 0 | Status: SHIPPED | OUTPATIENT
Start: 2018-12-05 | End: 2018-12-28 | Stop reason: SDUPTHER

## 2018-12-11 ENCOUNTER — TELEPHONE (OUTPATIENT)
Dept: GASTROENTEROLOGY | Facility: CLINIC | Age: 28
End: 2018-12-11

## 2018-12-11 NOTE — TELEPHONE ENCOUNTER
----- Message from Phuong De Los Santos sent at 12/11/2018  9:08 AM EST -----  Regarding: prep questions for friday   Contact: [phone]  Prep questions

## 2018-12-11 NOTE — TELEPHONE ENCOUNTER
Call to pt.  States receiving text messages from [employer] requesting complete forms online.  Advise to contact [employer] with any questions.  Verb understanding.

## 2018-12-11 NOTE — TELEPHONE ENCOUNTER
----- Message from Darryl Blanco sent at 12/11/2018  9:49 AM EST -----  Regarding: Patient has another questions - just talked to you  Contact: [phone]  Please call.

## 2018-12-14 ENCOUNTER — OUTSIDE FACILITY SERVICE (OUTPATIENT)
Dept: GASTROENTEROLOGY | Facility: CLINIC | Age: 28
End: 2018-12-14

## 2018-12-14 PROCEDURE — 45378 DIAGNOSTIC COLONOSCOPY: CPT | Performed by: INTERNAL MEDICINE

## 2018-12-14 PROCEDURE — 43239 EGD BIOPSY SINGLE/MULTIPLE: CPT | Performed by: INTERNAL MEDICINE

## 2018-12-27 ENCOUNTER — TELEPHONE (OUTPATIENT)
Dept: GASTROENTEROLOGY | Facility: CLINIC | Age: 28
End: 2018-12-27

## 2018-12-27 RX ORDER — FLUCONAZOLE 100 MG/1
TABLET ORAL
Qty: 15 TABLET | Refills: 0 | Status: SHIPPED | OUTPATIENT
Start: 2018-12-27 | End: 2019-03-21

## 2018-12-27 NOTE — TELEPHONE ENCOUNTER
Brushings from her esophagus show yeast - rec treating this with an antifungal - she has mild inflammation in her stomach and small intestine (the PPI she is taking is adequate to treat this).  We can consider trial of elavil as we discussed in the office after she completes her antifungal - antifungal sent to pharmacy - pls let me know if she wishes to try elavil or if she wishes to discuss or has questions

## 2018-12-28 NOTE — TELEPHONE ENCOUNTER
RESULTS   Received: Today      Call patient   Message Contents   [facility]   Phone Number: [phone]             SEE DR VIVEROS'S NOTE.      Called pt and advised per Dr Viveros that the brushings from her esophagus show yeast.  She recommends treating this with an antifungal.  She has mild inflammation in her stomach and sm intesting (the ppi she is taking is adequate to treat this).  We can consider trail of eleabil as we discussed in the office after she completes her antifungal.  This was sent to her pharmacy.  Advised pt to let us know if she wishes to try elavil or if she wishes to discuss or has questions.     Pt verb understanding and states she needs a f/u with Dr Viveros. Pt does not want to see NP.  Advised would send message to manager for appt time.

## 2018-12-31 RX ORDER — OMEPRAZOLE 40 MG/1
CAPSULE, DELAYED RELEASE ORAL
Qty: 30 CAPSULE | Refills: 5 | Status: SHIPPED | OUTPATIENT
Start: 2018-12-31 | End: 2019-06-29 | Stop reason: SDUPTHER

## 2019-01-24 ENCOUNTER — OFFICE VISIT (OUTPATIENT)
Dept: GASTROENTEROLOGY | Facility: CLINIC | Age: 29
End: 2019-01-24

## 2019-01-24 VITALS
TEMPERATURE: 98.6 F | WEIGHT: 106.8 LBS | BODY MASS INDEX: 16.76 KG/M2 | SYSTOLIC BLOOD PRESSURE: 86 MMHG | HEIGHT: 67 IN | DIASTOLIC BLOOD PRESSURE: 60 MMHG

## 2019-01-24 DIAGNOSIS — R10.84 GENERALIZED ABDOMINAL PAIN: Primary | ICD-10-CM

## 2019-01-24 DIAGNOSIS — K30 FUNCTIONAL DYSPEPSIA: ICD-10-CM

## 2019-01-24 DIAGNOSIS — R14.0 BLOATING: ICD-10-CM

## 2019-01-24 PROCEDURE — 99213 OFFICE O/P EST LOW 20 MIN: CPT | Performed by: INTERNAL MEDICINE

## 2019-01-24 RX ORDER — MONTELUKAST SODIUM 10 MG/1
10 TABLET ORAL DAILY
Refills: 5 | COMMUNITY
Start: 2019-01-02

## 2019-01-24 RX ORDER — AMITRIPTYLINE HYDROCHLORIDE 10 MG/1
10 TABLET, FILM COATED ORAL NIGHTLY
Qty: 30 TABLET | Refills: 5 | Status: SHIPPED | OUTPATIENT
Start: 2019-01-24 | End: 2019-05-24 | Stop reason: DRUGHIGH
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
## 2019-03-21 ENCOUNTER — OFFICE VISIT (OUTPATIENT)
Dept: GASTROENTEROLOGY | Facility: CLINIC | Age: 29
End: 2019-03-21

## 2019-03-21 VITALS
DIASTOLIC BLOOD PRESSURE: 58 MMHG | HEIGHT: 67 IN | BODY MASS INDEX: 16.7 KG/M2 | WEIGHT: 106.4 LBS | SYSTOLIC BLOOD PRESSURE: 94 MMHG | TEMPERATURE: 98 F

## 2019-03-21 DIAGNOSIS — R63.4 WEIGHT LOSS: ICD-10-CM

## 2019-03-21 DIAGNOSIS — R19.4 CHANGE IN BOWEL HABITS: ICD-10-CM

## 2019-03-21 DIAGNOSIS — R14.0 ABDOMINAL BLOATING: ICD-10-CM

## 2019-03-21 DIAGNOSIS — R10.10 PAIN OF UPPER ABDOMEN: Primary | ICD-10-CM

## 2019-03-21 PROCEDURE — 99214 OFFICE O/P EST MOD 30 MIN: CPT | Performed by: NURSE PRACTITIONER
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
## 2019-04-18 ENCOUNTER — TELEPHONE (OUTPATIENT)
Dept: GASTROENTEROLOGY | Facility: CLINIC | Age: 29
End: 2019-04-18

## 2019-04-18 NOTE — TELEPHONE ENCOUNTER
----- Message from BHAVANA Hare sent at 3/21/2019  1:42 PM EDT -----  Seen for f/u. Somewhat better abd pain with the elavil. Still having bloating, gas and change in bowel habits. What do you think about xifaxan? Or SBFT? I am having her change up her probiotic and add more fiber. What are your thoughts?

## 2019-04-18 NOTE — TELEPHONE ENCOUNTER
She had negative sibo testing - not sure xifaxan will be helpful - I am not opposed to sbft if she wishes to proceed with additional testing    If she is tolerating the elavil might also consider increasing dose to 25 mg/night

## 2019-04-19 NOTE — TELEPHONE ENCOUNTER
Call to pt.  Advise per Dr Viveros that had negative sibo testing  - not sure xifaxan will be helpful.  Not opporsed to sbft if wishes to proceed with additional testing.    If tolerate the elavil, might also consider increasing dose to 25 mg/night.    Pt verb understanding.  States will think about these options and call back.

## 2019-05-24 ENCOUNTER — TELEPHONE (OUTPATIENT)
Dept: GASTROENTEROLOGY | Facility: CLINIC | Age: 29
End: 2019-05-24

## 2019-05-24 RX ORDER — AMITRIPTYLINE HYDROCHLORIDE 25 MG/1
25 TABLET, FILM COATED ORAL NIGHTLY
Qty: 30 TABLET | Refills: 5 | Status: SHIPPED | OUTPATIENT
Start: 2019-05-24 | End: 2019-07-10 | Stop reason: DRUGHIGH

## 2019-05-24 NOTE — TELEPHONE ENCOUNTER
----- Message from Vanessa Marques sent at 5/24/2019 12:41 PM EDT -----  Regarding: meds  Contact: [phone]  Pt would like to increase her amitriptyline now. Dr Viveros talked about imaging pt doesn't want to do that. Voicemail ok, but would rather talk with you after 3 today if possible. Thx

## 2019-05-28 NOTE — TELEPHONE ENCOUNTER
Called pt and advised per Dr Viveros that her elavil dose increased to 25mg and she has sent this to her pharmacy.  Advised to f/u as scheduled. Pt verb understanding.

## 2019-07-01 RX ORDER — OMEPRAZOLE 40 MG/1
CAPSULE, DELAYED RELEASE ORAL
Qty: 30 CAPSULE | Refills: 5 | Status: SHIPPED | OUTPATIENT
Start: 2019-07-01 | End: 2019-10-10 | Stop reason: ALTCHOICE

## 2019-07-10 ENCOUNTER — OFFICE VISIT (OUTPATIENT)
Dept: GASTROENTEROLOGY | Facility: CLINIC | Age: 29
End: 2019-07-10

## 2019-07-10 VITALS
BODY MASS INDEX: 16.76 KG/M2 | DIASTOLIC BLOOD PRESSURE: 68 MMHG | WEIGHT: 106.8 LBS | SYSTOLIC BLOOD PRESSURE: 102 MMHG | TEMPERATURE: 98.1 F | HEIGHT: 67 IN

## 2019-07-10 DIAGNOSIS — R10.10 PAIN OF UPPER ABDOMEN: ICD-10-CM

## 2019-07-10 DIAGNOSIS — K58.2 IRRITABLE BOWEL SYNDROME WITH BOTH CONSTIPATION AND DIARRHEA: Primary | ICD-10-CM

## 2019-07-10 DIAGNOSIS — R14.0 ABDOMINAL BLOATING: ICD-10-CM

## 2019-07-10 PROCEDURE — 99213 OFFICE O/P EST LOW 20 MIN: CPT | Performed by: INTERNAL MEDICINE

## 2019-07-10 NOTE — PROGRESS NOTES
Chief Complaint   Patient presents with   • Abdominal Pain   • Diarrhea       Jose Wadsworth is a  28 y.o. female here for a follow up visit for abdominal pain and distention.     Most recently in May we increased her Elavil dose to 25 mg nightly.  She had noticed an movement in the burning abdominal pain she felt with the 10 mg dose but still had some persistent symptoms so we tried to increase a dose.  Since that time she is really not had any significant improvement and in fact she feels like she is had more pain, constipation and distention.  She complains of some mild sharp lower abdominal pains which are short-lived but very uncomfortable.  She is also had some intermittent left upper quadrant pain as well as a persistent left upper quadrant pain which was very painful.  Her bowel movements continue to be irregular.  When she is uncomfortable she is unable to eat.  Her weight is low but stable.  She had no nausea or vomiting.    She had negative SIBO testing.  HPI  Past Medical History:   Diagnosis Date   • Allergic rhinitis    • Asthma    • Chronic constipation    • Irritable bowel syndrome 2013   • Vitamin B 12 deficiency    • Vitamin D deficiency      Past Surgical History:   Procedure Laterality Date   • APPENDECTOMY N/A 02/05/2016    Dr. Duong Johnson   • COLONOSCOPY  12/14/2018    Normal   • DENTAL PROCEDURE      x 2   • ENDOSCOPY  12/14/2018    Yeast in esophagus   • LASIK     • LEEP      LEEP PROCEDURE       Current Outpatient Medications:   •  albuterol (PROVENTIL HFA;VENTOLIN HFA) 108 (90 Base) MCG/ACT inhaler, Inhale 2 puffs., Disp: , Rfl:   •  Ascorbic Acid (VITAMIN C PO), Take  by mouth., Disp: , Rfl:   •  Cetirizine HCl (ZYRTEC ALLERGY PO), Take by mouth., Disp: , Rfl:   •  Cholecalciferol (VITAMIN D3 PO), Take  by mouth., Disp: , Rfl:   •  CVS B-12 1000 MCG tablet controlled-release, Take 1 tablet by mouth Daily., Disp: , Rfl: 11  •  Ergocalciferol (VITAMIN D2 PO), Take by mouth., Disp: ,  Rfl:   •  fluticasone (FLONASE ALLERGY RELIEF) 50 MCG/ACT nasal spray, , Disp: , Rfl:   •  Fluticasone Furoate-Vilanterol (BREO ELLIPTA) 200-25 MCG/INH aerosol powder , , Disp: , Rfl:   •  LORYNA 3-0.02 MG per tablet, , Disp: , Rfl:   •  meloxicam (MOBIC) 15 MG tablet, Take 15 mg by mouth Daily., Disp: , Rfl:   •  montelukast (SINGULAIR) 10 MG tablet, Take 10 mg by mouth Daily., Disp: , Rfl: 5  •  omeprazole (priLOSEC) 40 MG capsule, TAKE 1 CAPSULE BY MOUTH EVERY DAY, Disp: 30 capsule, Rfl: 5  •  polyethylene glycol (MIRALAX) packet, Take 17 g by mouth daily., Disp: , Rfl:   •  Probiotic Product (PROBIOTIC PO), Take  by mouth., Disp: , Rfl:   •  B Complex Vitamins (VITAMIN B-COMPLEX PO), Take  by mouth., Disp: , Rfl:   •  clindamycin (CLINDAGEL) 1 % gel, , Disp: , Rfl:   •  saccharomyces boulardii (FLORASTOR) 250 MG capsule, Take 250 mg by mouth 2 (Two) Times a Day., Disp: , Rfl:   PRN Meds:.  Allergies   Allergen Reactions   • Metronidazole GI Intolerance   • Neomycin Rash   • Nickel Rash   • Other Rash     DISPERSE DYES     Social History     Socioeconomic History   • Marital status: Single     Spouse name: Not on file   • Number of children: Not on file   • Years of education: Not on file   • Highest education level: Not on file   Tobacco Use   • Smoking status: Never Smoker   • Smokeless tobacco: Never Used   Substance and Sexual Activity   • Alcohol use: Yes     Comment: WEEKENDS AND HOLIDAYS   • Drug use: No   • Sexual activity: Not Currently     Family History   Problem Relation Age of Onset   • Gallbladder disease Mother    • Irritable bowel syndrome Mother    • Crohn's disease Father      Review of Systems   Constitutional: Negative for appetite change and unexpected weight change.   Gastrointestinal: Positive for abdominal distention, abdominal pain and constipation.   All other systems reviewed and are negative.    Vitals:    07/10/19 1548   BP: 102/68   Temp: 98.1 °F (36.7 °C)         07/10/19  1548    Weight: 48.4 kg (106 lb 12.8 oz)     Physical Exam   Constitutional: She appears well-developed and well-nourished.   HENT:   Head: Normocephalic and atraumatic.   Eyes: No scleral icterus.   Pulmonary/Chest: Effort normal.   Abdominal: Soft. She exhibits no distension. There is no tenderness.   Neurological: She is alert.   Skin: Skin is warm and dry.   Psychiatric: She has a normal mood and affect.     No images are attached to the encounter.  Diagnoses and all orders for this visit:    Irritable bowel syndrome with both constipation and diarrhea    Pain of upper abdomen    Abdominal bloating    · Stop elavil - resume in about a week at 10 mg (or 1/2 pill 25 mg) she did have a positive response to the low-dose but the 25 mg dose seems to be doing more harm than good  · Use gas pills as needed  · Think about whether to try xifaxan for IBS  · She will call us after she is thought about the Xifaxan and let me know how she wishes to proceed as well as how she is doing on the lower dose of Elavil  · She also plans to pursue a work-up for fibromyalgia-her orthopedist has suggested this

## 2019-07-10 NOTE — PATIENT INSTRUCTIONS
· Stop elavil - resume in bout a week at 10 mg (or 1/2 pill 25 mg)  · Use gas pills as needed  · Think about whether to try xifaxan

## 2019-08-28 ENCOUNTER — TELEPHONE (OUTPATIENT)
Dept: GASTROENTEROLOGY | Facility: CLINIC | Age: 29
End: 2019-08-28

## 2019-08-28 NOTE — TELEPHONE ENCOUNTER
----- Message from Shannan Hammond sent at 8/28/2019  3:22 PM EDT -----  Regarding: follow up  Contact: [phone]  Pt said she was told to call back with update and she is not feeling any better wants to talk to the nurse.

## 2019-08-28 NOTE — TELEPHONE ENCOUNTER
Called pt and pt reports that nothing has changed.  She states she is still having abd pain.  Pt does report that her bm's are still not normal,  She is having both constipation and loose stools.  She is currently taking omeprazole 40mg and elavil 12.5mg. Pt states the pain comes and goes and she does not know what causes this.  She states Dr Viveros want her to call with an update. Advised pt will send message to Dr Viveros.

## 2019-09-06 ENCOUNTER — PRIOR AUTHORIZATION (OUTPATIENT)
Dept: GASTROENTEROLOGY | Facility: CLINIC | Age: 29
End: 2019-09-06

## 2019-09-27 RX ORDER — AMITRIPTYLINE HYDROCHLORIDE 25 MG/1
25 TABLET, FILM COATED ORAL NIGHTLY
Qty: 30 TABLET | Refills: 5 | Status: SHIPPED | OUTPATIENT
Start: 2019-09-27 | End: 2020-04-01

## 2019-10-10 ENCOUNTER — OFFICE VISIT (OUTPATIENT)
Dept: GASTROENTEROLOGY | Facility: CLINIC | Age: 29
End: 2019-10-10

## 2019-10-10 VITALS
BODY MASS INDEX: 16.48 KG/M2 | WEIGHT: 105 LBS | TEMPERATURE: 98.1 F | SYSTOLIC BLOOD PRESSURE: 100 MMHG | DIASTOLIC BLOOD PRESSURE: 64 MMHG | HEIGHT: 67 IN

## 2019-10-10 DIAGNOSIS — K58.1 IRRITABLE BOWEL SYNDROME WITH CONSTIPATION: Primary | ICD-10-CM

## 2019-10-10 DIAGNOSIS — R14.0 ABDOMINAL BLOATING: ICD-10-CM

## 2019-10-10 PROCEDURE — 99213 OFFICE O/P EST LOW 20 MIN: CPT | Performed by: INTERNAL MEDICINE

## 2019-10-10 RX ORDER — PREDNISONE 20 MG/1
40 TABLET ORAL 2 TIMES DAILY WITH MEALS
Refills: 0 | COMMUNITY
Start: 2019-10-02 | End: 2020-01-22

## 2019-10-10 RX ORDER — FAMOTIDINE 40 MG/1
40 TABLET, FILM COATED ORAL DAILY
Qty: 30 TABLET | Refills: 3 | Status: SHIPPED | OUTPATIENT
Start: 2019-10-10 | End: 2020-02-04

## 2019-10-10 RX ORDER — PSEUDOEPHEDRINE HCL 30 MG
TABLET ORAL
COMMUNITY
Start: 2019-02-23 | End: 2022-07-21

## 2019-10-10 RX ORDER — AZITHROMYCIN 250 MG/1
250 TABLET, FILM COATED ORAL DAILY
COMMUNITY
End: 2020-01-22
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
## 2019-10-11 ENCOUNTER — PRIOR AUTHORIZATION (OUTPATIENT)
Dept: GASTROENTEROLOGY | Facility: CLINIC | Age: 29
End: 2019-10-11

## 2019-10-21 NOTE — TELEPHONE ENCOUNTER
Please let her know that her insurance company requires failure of 2 over-the-counter laxatives.  She is tried MiraLAX in the past.  The other medicine that they recommend is senna which is fairly benign.  It is not my favorite drug for long-term use however for the short-term it is reasonable.  Please have her try Senokot 1 to 2 tablets daily for the next 3 to 4 weeks and let me know how things are going.  If it does not improve we can then send a letter to the insurance company to try to get trulance approved

## 2019-10-22 NOTE — TELEPHONE ENCOUNTER
Would not recommend completing the Xifaxan as she has not responded to this yet.  While she has not yet had a good response to Trulance, I do not think she is had an adequate trial and I would still like to try it if we can get it approved.  In the meantime, would try Senokot to see if this will improve her symptoms because I feel like her constipation needs to be addressed in the hopes that it will improve her bloating and discomfort

## 2019-10-22 NOTE — TELEPHONE ENCOUNTER
Called pt and advised per Dr Viveros that she would not recommend completing the xifaxan as she has not responded to this yet.   While she has not yet had a good response to trulance, she does not think she has had an adequate trial and she would still like to try it if we can get it approved.  In the meantime, would try senokot to see if this will improve her symptoms because she feels like her constipation needs to be addressed in the hopes that it will improve her bloating and discomfort.  Pt verb understanding.

## 2019-10-22 NOTE — TELEPHONE ENCOUNTER
Called pt and advised per Dr Viveros that her insurance company requires failure of 2 otc laxatives.  She has tried miralax..  The other med that they recommends is senna which is fairly benign.It is not her favorite drug for long term use however for the short term it is reasonable.      ADvised pt to try senokot 1-2 tabs daily for the next 3-4 wks and let her know how thinks are going.  If it does not improve we can then send a letter to the insurance company to try and get trulance approve.     Pt verb understanding Pt reports she is on her third day of trulance and it has not helped much.  She is having bm's but they are incomplete.  Pt also reports that she has taken a round and a half of xifaxan.  Pt stopped mid way into the second round after her visit with Dr Viveros.  She is asking if she go ahead and take this again.  Advised will send message to Dr Viveros.

## 2019-11-19 ENCOUNTER — TELEPHONE (OUTPATIENT)
Dept: GASTROENTEROLOGY | Facility: CLINIC | Age: 29
End: 2019-11-19

## 2019-11-19 RX ORDER — AMITRIPTYLINE HYDROCHLORIDE 10 MG/1
TABLET, FILM COATED ORAL
Qty: 30 TABLET | Refills: 5 | Status: SHIPPED | OUTPATIENT
Start: 2019-11-19 | End: 2020-04-01 | Stop reason: SDUPTHER

## 2019-11-19 NOTE — TELEPHONE ENCOUNTER
----- Message from Phuong De Los Santos sent at 11/19/2019 12:46 PM EST -----  Regarding: UPDATE  Contact: [phone]  MEDICATION IS NOT WORKING AND ASKING FOR SOMETHING DIFFERENT

## 2019-11-19 NOTE — TELEPHONE ENCOUNTER
Called pt and pt reports that for the last few weeks she has been trying the senokot.  She reports she has been taking 2 pills daily.  She states that this really has not helped.  She reports she has frequent sm incomplete bm's .  She is asking if now can we try to get the trulance.  Advised will send message to Dr Viveros. Pt verb understanding.

## 2019-11-20 NOTE — TELEPHONE ENCOUNTER
Called pt and on identified vm advised per Dr Viveros that she has ordered the the trulance and if they deny it she will appeal. Advised to call with questions.

## 2019-12-12 ENCOUNTER — TELEPHONE (OUTPATIENT)
Dept: GASTROENTEROLOGY | Facility: CLINIC | Age: 29
End: 2019-12-12

## 2019-12-12 NOTE — TELEPHONE ENCOUNTER
----- Message from Brenda Domínguez sent at 12/12/2019 12:57 PM EST -----  Regarding: Trulance PA  Contact: [phone]  Pt is calling regarding the Trulance approval/denial

## 2019-12-12 NOTE — TELEPHONE ENCOUNTER
See note of 11/19.    Call to pt.  States wasn't sure re: status of trulance - has not heard from pharmacy.    Call to [facility] @ [phone] and spoke with Kayode. [facility] PA needed.       It is noted that PA completed 10/10 - denied.  Dr Viveros ordered 11/19 with plan that would appeal if denied.     Message to DR Viveros.

## 2019-12-17 NOTE — TELEPHONE ENCOUNTER
Call to [facility] @  and spoke with Valeria.  Placed on lengthy hold and then picked up by  Robyn.      States needs to refax as Urgent Appeal to .  Did so - confirmation received.

## 2019-12-19 ENCOUNTER — PRIOR AUTHORIZATION (OUTPATIENT)
Dept: GASTROENTEROLOGY | Facility: CLINIC | Age: 29
End: 2019-12-19

## 2019-12-19 NOTE — TELEPHONE ENCOUNTER
Call to [facility] and spoke with Meryl.  [place] insurance no longer active as of 10/31/19.  Checked state portal - no active insurance with Medicaid at this time.     Call to pt. State current insurance is [employer].  Advise pt that 3 boxes of samples at .  Will pursue.     Call to [facility] @ [phone] and spoke with Tanika. [employer] is active insurance on file.  Will fax PA request to [phone].

## 2019-12-20 NOTE — TELEPHONE ENCOUNTER
"Call to pt.  Advise that PA has been denied for Trulance with new insurance.  Checking specifics of insurance so that may find formulary equivalent on web site.      Pt gave info.  States has not sent anyone to  Trulance samples, because prefers not to start until can remain on.   Has not been in a hurry about this because wonders if other rx would be better, although understands may take some time to see best benefits from Trulance.   States using Senokot.  Bowel pattern \"all over the place\".  Has mult BM's/day, but never feels as though empties.  Denies blood.      Senokot has helped with bloating, but does continue to have cramps.      Attempt to enter pt insurance info into web site - more detailed questions than have access to.  Call to pt.  Request she contact insurance to find out formulary equivalent.  May be meds she has already tried, and that could appeal as prior. Verb understanding - will pursue and notify this RN.    Advise pt will also update Dr Viveros. Verb understanding.   "

## 2019-12-20 NOTE — TELEPHONE ENCOUNTER
PA denied for Trulance. Message on Cover My Meds says:     The drug you asked for is non-formulary (not on [employer] list of preferred drugs). Before the drug can be covered, we need more information. Please ask your prescriber to explain to Human why the preferred drug, lactulose oral solution, has not worked for your medical condition and/or would have bad side effects. The list of drugs offered by your current [employer] contract can be found at [email or web address] in the printable drug list. If you have not tried a preferred drug, please talk to your health care provider about prescribing one of these for you. Some of the preferred drugs may require an additional review and approval by [employer]. This determination was based on the [employer] Non-Formulary Exceptions Coverage Policy.

## 2020-01-03 ENCOUNTER — TELEPHONE (OUTPATIENT)
Dept: GASTROENTEROLOGY | Facility: CLINIC | Age: 30
End: 2020-01-03

## 2020-01-03 NOTE — TELEPHONE ENCOUNTER
Call to pt.  States received letter from [facility] stating that Trulance denied as non formulary.  Need explanation as to why preferred drug, lactulose solution has not worked and/or would have bad side effects. (see media tab 12/20).     Asking if Dr Viveros thinks lactulose would be appropriate for her.  Does not every remember being on this - woud like to k now more about lactulose before taking.  (has not been on trulance because prefers to have above denial addressed before starting).      Update to Dr Viveros.

## 2020-01-06 NOTE — TELEPHONE ENCOUNTER
Lactulose is an old medicine and is cheap-it works well for constipation but it sometimes causes bloating and gas and this may be an issue for her as this is 1 of her more prominent complaints.  If she wishes to try it we certainly can.  We can start at a low dose and increase it as tolerated

## 2020-01-08 NOTE — TELEPHONE ENCOUNTER
Call to pt.  Advise per Dr Viveros that lactulose is an old med and is cheap.  Works well for constipation, but sometimes causes bloating and gas, and this may be an issue for pt as this is one of more prominent c/o.      Verb understanding.  States because of possible side effects, does not want to proceed with lactulose.  Message to Dr Viveros.

## 2020-01-08 NOTE — TELEPHONE ENCOUNTER
Call to [employer] and spoke with Lyndon.  Per instructions, appeal letter faxed to   - confirmation received.  (see media tab)     Call to pt. Advise that appeal has been sen to [employer].  Will keep apprised of response.  Verb understanding.

## 2020-01-17 NOTE — TELEPHONE ENCOUNTER
Call to [employer] @  and spoke with Alexandrea.  Transferred to  and spoke with Christian.  Transferred to  - caught in phone loop.     Return call to [employer] and spoke with Padmini.  Transferred to  and spoke with Ricky.  Transferred to Sofia.  Transferred to Blanca. Transferred to  and spoke with Macy. Transferred to [place].  States must have AOR form in order to complete appeal.  Downloaded from [employer] website. States to fax to .    Call to pt  States wants to read and sign form herself.  Will do so at upcoming appt on 1/22 - form at .

## 2020-01-22 ENCOUNTER — OFFICE VISIT (OUTPATIENT)
Dept: GASTROENTEROLOGY | Facility: CLINIC | Age: 30
End: 2020-01-22

## 2020-01-22 VITALS
SYSTOLIC BLOOD PRESSURE: 116 MMHG | BODY MASS INDEX: 16.92 KG/M2 | TEMPERATURE: 97.9 F | WEIGHT: 107.8 LBS | DIASTOLIC BLOOD PRESSURE: 82 MMHG | HEIGHT: 67 IN

## 2020-01-22 DIAGNOSIS — K58.1 IRRITABLE BOWEL SYNDROME WITH CONSTIPATION: Primary | ICD-10-CM

## 2020-01-22 DIAGNOSIS — R14.0 ABDOMINAL BLOATING: ICD-10-CM

## 2020-01-22 PROCEDURE — 99213 OFFICE O/P EST LOW 20 MIN: CPT | Performed by: INTERNAL MEDICINE

## 2020-01-22 NOTE — TELEPHONE ENCOUNTER
Pt in office - AOR form signed.  Faxed to [employer] @ [phone] - confirmation received.  See media tab.
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
## 2020-01-27 NOTE — TELEPHONE ENCOUNTER
Call to [employer] @ [phone] and spoke with Morena.  Per instructions, call to  and spoke with Kina. She took all info and wanted to transfer back to above #.  Advise this has already been attempted.      Transferred to Claims @ [phone] - opt 2 and spoke with Felicia.  Documents received - # [id number].  Appeal under review.

## 2020-01-30 NOTE — TELEPHONE ENCOUNTER
Call to [facility] and spoke with [place]. Transferred to Pura.  Transferred to Priscila.  Transferred x2 to Benjamin.  States AOR forms need to be refaxed to him @ [phone]  Once reviewed, he will escalate and then notify this RN of status.   Faxed as requested - confirmation received.  See media tab.      Reference # for this call is [id number].

## 2020-02-04 RX ORDER — FAMOTIDINE 40 MG/1
TABLET, FILM COATED ORAL
Qty: 30 TABLET | Refills: 3 | Status: SHIPPED | OUTPATIENT
Start: 2020-02-04 | End: 2020-07-31 | Stop reason: SDUPTHER

## 2020-03-23 RX ORDER — LUBIPROSTONE 8 UG/1
8 CAPSULE ORAL 2 TIMES DAILY WITH MEALS
Qty: 60 CAPSULE | Refills: 3 | Status: SHIPPED | OUTPATIENT
Start: 2020-03-23 | End: 2020-10-02

## 2020-03-31 ENCOUNTER — TELEPHONE (OUTPATIENT)
Dept: GASTROENTEROLOGY | Facility: CLINIC | Age: 30
End: 2020-03-31

## 2020-03-31 NOTE — TELEPHONE ENCOUNTER
Faxed request received from [employer] for elavil 10 mg - 1 tab by mouth every night, #90.     Message to Dr Viveros.

## 2020-04-01 RX ORDER — AMITRIPTYLINE HYDROCHLORIDE 10 MG/1
10 TABLET, FILM COATED ORAL
Qty: 30 TABLET | Refills: 5 | Status: SHIPPED | OUTPATIENT
Start: 2020-04-01 | End: 2020-11-09

## 2020-06-02 ENCOUNTER — TELEPHONE (OUTPATIENT)
Dept: GASTROENTEROLOGY | Facility: CLINIC | Age: 30
End: 2020-06-02

## 2020-06-02 NOTE — TELEPHONE ENCOUNTER
Call to pt.  States has no further refills on famotidine - asking if she should continue this.      Also, [facility] never heard from pharmacy or insurance re: amitiza.  (see encounter notes of 1/3 with amitiza being prescribed on 3/23).      Call to [employer] @ [phone] and spoke with Antonio.  PA required - not on formulary.  He is faxing PA request to this office.  VM to pt to advise PA will be processed.     Message to Dr Viveros and Austin.

## 2020-06-02 NOTE — TELEPHONE ENCOUNTER
----- Message from Shannan Hammond sent at 6/2/2020 10:55 AM EDT -----  Regarding: meds  Contact: [phone]  Pt is calling wanting to talk to nurse about her medication.

## 2020-06-03 ENCOUNTER — PRIOR AUTHORIZATION (OUTPATIENT)
Dept: GASTROENTEROLOGY | Facility: CLINIC | Age: 30
End: 2020-06-03

## 2020-06-10 NOTE — TELEPHONE ENCOUNTER
If patient is continued to have issues with heartburn, recommend continuing Pepcid, otherwise okay to try and discontinue.  She may have some rebound symptoms for the first 1 to 2 weeks but if it subsides, okay to discontinue.  If she wishes to have a refill please send to pharmacy (5 refills)

## 2020-06-10 NOTE — TELEPHONE ENCOUNTER
Call to pt.  Advise per Dr Viveros note.  Verb understanding.  States ran out - has been ok without.  Will call back if needed.

## 2020-06-10 NOTE — TELEPHONE ENCOUNTER
Call to pt.  Advise that PA approved for amitiza.  Verb understanding - will contact pharmacy.     Update to Dr Viveros.

## 2020-07-31 ENCOUNTER — TELEPHONE (OUTPATIENT)
Dept: GASTROENTEROLOGY | Facility: CLINIC | Age: 30
End: 2020-07-31

## 2020-07-31 RX ORDER — FAMOTIDINE 40 MG/1
40 TABLET, FILM COATED ORAL DAILY
Qty: 30 TABLET | Refills: 3 | Status: SHIPPED | OUTPATIENT
Start: 2020-07-31 | End: 2021-06-09

## 2020-07-31 NOTE — TELEPHONE ENCOUNTER
"VM received from pt re: med questions.     Call to pt.  States has been taking amitiza 8 mcg twice daily as ordered.  Has BM daily, but does not feel that cleans out.  Has caused bloating and stomach discomfort.  Passes pellets.    Additionally, seems to have made reflux symptoms worse - experiencing nausea and \"threw up in my mouth a little\" one time.  Had stopped famotidine because was doing better, but now has run out of famotidine.     Advise pt Dr Viveros out of office. Will refill famotidine.  Pt states ok to await Dr Viveros's return re: amitiza response.  Advise if symptoms worsen, contact MD on call.     Tammy completed for famotidine 40 mg 1 tab po day, #30, R3.       "

## 2020-08-28 ENCOUNTER — TELEPHONE (OUTPATIENT)
Dept: GASTROENTEROLOGY | Facility: CLINIC | Age: 30
End: 2020-08-28

## 2020-08-28 NOTE — TELEPHONE ENCOUNTER
Called pt and pt reports she has been taking amitiza 3 pills a day for over 3 wks.  She states she is bloated all the time and reports it is painful when she wakes up in the am.  She does report going more regular but she still does not feel like she is emptying.  She is asking what should she do. ADvised will send message to Dr Viveros. Pt verb understanding.

## 2020-08-31 NOTE — TELEPHONE ENCOUNTER
Recommend sitz marker study for further evaluation of her colonic emptying.  This is a test where she take a pill with several small markers and it.  An x-ray was taken 5 days later to see where the markers are distributed throughout the colon.  This will help us understand whether she is just globally slow or whether she has rectal dysfunction that warrants further evaluation.  If she is agreeable, please me know and I will get this scheduled

## 2020-08-31 NOTE — TELEPHONE ENCOUNTER
Called pt and advised of Dr Viveros's note. Pt verb understanding but would like to think about the sitz marker test. Update sent to Dr Viveros.

## 2020-09-15 ENCOUNTER — TELEPHONE (OUTPATIENT)
Dept: GASTROENTEROLOGY | Facility: CLINIC | Age: 30
End: 2020-09-15

## 2020-09-15 DIAGNOSIS — K59.00 CONSTIPATION, UNSPECIFIED CONSTIPATION TYPE: Primary | ICD-10-CM

## 2020-09-15 NOTE — TELEPHONE ENCOUNTER
Call to pt.  F/u to note of 8/28.  Asking if any side effects to sitz marker study.      Brief overview of what involved - come to office to obtain sitzmarker, take by mouth and then 5 days later have xray.  Will send question to DR Viveros re: any side effects.  Verb understanding.

## 2020-09-15 NOTE — TELEPHONE ENCOUNTER
----- Message from Shannan Pina sent at 9/14/2020  3:59 PM EDT -----  Regarding: call back  Contact: [phone]  Pt calling back

## 2020-09-15 NOTE — TELEPHONE ENCOUNTER
Call to pt.  Advise per Dr Viveros note.  Verb understanding.  Will proceed with sitzmark study.  Advise to come to this office - Scheduling - to  sitzmarker.  Cost $ 20- 30.  Then 5 days later, go to [facility] - Monday to Friday 7am to 5pm for abd xray.      Asking if should continue amitiza in the meantime - question to DR Viveros.  (states ok to leave  with reply).     Order placed for abd series - flat and upright.

## 2020-09-28 ENCOUNTER — HOSPITAL ENCOUNTER (OUTPATIENT)
Dept: GENERAL RADIOLOGY | Facility: HOSPITAL | Age: 30
Discharge: HOME | End: 2020-09-28
Admitting: INTERNAL MEDICINE

## 2020-09-28 DIAGNOSIS — K59.00 CONSTIPATION, UNSPECIFIED CONSTIPATION TYPE: ICD-10-CM

## 2020-09-28 PROCEDURE — 74019 RADEX ABDOMEN 2 VIEWS: CPT

## 2020-09-29 ENCOUNTER — TELEPHONE (OUTPATIENT)
Dept: GASTROENTEROLOGY | Facility: CLINIC | Age: 30
End: 2020-09-29

## 2020-10-02 RX ORDER — LUBIPROSTONE 24 UG/1
24 CAPSULE ORAL
Qty: 90 CAPSULE | Refills: 3 | Status: SHIPPED | OUTPATIENT
Start: 2020-10-02 | End: 2021-06-09 | Stop reason: SDUPTHER

## 2020-10-02 NOTE — TELEPHONE ENCOUNTER
Call to pt.  Advise per Dr Viveros note.  Verb understanding.     States because has had such long stand issue, would like to proceed with referral to Dr Fabian.     [facility] has been taking amitiza 8 mcg - taking 3 pills/day.  Because of this increase, will need new rx.     Requests to Dr Viveros.

## 2020-10-05 NOTE — TELEPHONE ENCOUNTER
Referral / records have been faxed over to Dr. Thomas office, they will contact patient to schedule.

## 2020-11-09 RX ORDER — AMITRIPTYLINE HYDROCHLORIDE 10 MG/1
TABLET, FILM COATED ORAL
Qty: 30 TABLET | Refills: 3 | Status: SHIPPED | OUTPATIENT
Start: 2020-11-09 | End: 2021-02-25 | Stop reason: SDUPTHER

## 2021-02-19 ENCOUNTER — TELEPHONE (OUTPATIENT)
Dept: GASTROENTEROLOGY | Facility: CLINIC | Age: 31
End: 2021-02-19

## 2021-02-19 NOTE — TELEPHONE ENCOUNTER
Faxed request received from [facility] for amitriptyline 10 mg - take 1 tab by mouth QHS, #90.     Pt last seen 1/22/20.  Denial faxed to [phone] - confirmation received.

## 2021-02-22 RX ORDER — AMITRIPTYLINE HYDROCHLORIDE 10 MG/1
TABLET, FILM COATED ORAL
Qty: 30 TABLET | Refills: 3 | OUTPATIENT
Start: 2021-02-22

## 2021-02-25 ENCOUNTER — TELEPHONE (OUTPATIENT)
Dept: GASTROENTEROLOGY | Facility: CLINIC | Age: 31
End: 2021-02-25

## 2021-02-25 RX ORDER — AMITRIPTYLINE HYDROCHLORIDE 10 MG/1
10 TABLET, FILM COATED ORAL
Qty: 30 TABLET | Refills: 3 | Status: SHIPPED | OUTPATIENT
Start: 2021-02-25 | End: 2021-07-12 | Stop reason: SDUPTHER

## 2021-02-25 NOTE — TELEPHONE ENCOUNTER
----- Message from Shannan Cao sent at 2/25/2021  1:20 PM EST -----  Regarding: amitriptyline (ELAVIL) 10 MG tablet  Contact: [phone]  PT has an appt with ezra 05/05, please fill amitriptyline (ELAVIL) 10 MG tablet until office visit       Pharmacy:  [facility]/pharmacy #[id number] - [place], [place] - [address] AT [facility] - [phone]  - [phone] FX  Phone:  [phone]  Fax:  [phone]  Address:  [address], [place] [id number]  Pharmacy Comments: --

## 2021-04-16 ENCOUNTER — BULK ORDERING (OUTPATIENT)
Dept: CASE MANAGEMENT | Facility: OTHER | Age: 31
End: 2021-04-16

## 2021-04-16 DIAGNOSIS — Z23 IMMUNIZATION DUE: ICD-10-CM

## 2021-06-09 ENCOUNTER — OFFICE VISIT (OUTPATIENT)
Dept: GASTROENTEROLOGY | Facility: CLINIC | Age: 31
End: 2021-06-09

## 2021-06-09 VITALS — WEIGHT: 112.8 LBS | HEIGHT: 67 IN | BODY MASS INDEX: 17.71 KG/M2

## 2021-06-09 DIAGNOSIS — K58.1 IRRITABLE BOWEL SYNDROME WITH CONSTIPATION: Primary | ICD-10-CM

## 2021-06-09 DIAGNOSIS — R14.0 ABDOMINAL BLOATING: ICD-10-CM

## 2021-06-09 DIAGNOSIS — R10.10 PAIN OF UPPER ABDOMEN: ICD-10-CM

## 2021-06-09 PROCEDURE — 99214 OFFICE O/P EST MOD 30 MIN: CPT | Performed by: INTERNAL MEDICINE

## 2021-06-09 RX ORDER — DROSPIRENONE AND ETHINYL ESTRADIOL 0.02-3(28)
KIT ORAL
COMMUNITY
Start: 2021-03-25

## 2021-06-09 RX ORDER — LUBIPROSTONE 24 UG/1
24 CAPSULE ORAL 2 TIMES DAILY WITH MEALS
Qty: 180 CAPSULE | Refills: 3 | Status: SHIPPED | OUTPATIENT
Start: 2021-06-09 | End: 2021-12-14

## 2021-06-09 NOTE — PROGRESS NOTES
Subjective   Chief Complaint   Patient presents with   • Irritable Bowel Syndrome   • Abdominal Pain       Jose Wadsworth is a  30 y.o. female here for a follow up visit for IBS-C, chronic abdominal pain.    She had normal Sitzmarks testing in the fall 2020.  She was last seen in the office in January 2020.     She reports more bloating in the morning of late.  Pain due to distension.  Symptoms worse if she misses amitiza.  Stools soliid, not daily -no diarrhea.  Avoids dairy.  Weight stable.  HPI  Past Medical History:   Diagnosis Date   • Allergic rhinitis    • Asthma    • Chronic constipation    • Irritable bowel syndrome 2013   • Upper respiratory infection    • Vitamin B 12 deficiency    • Vitamin D deficiency      Past Surgical History:   Procedure Laterality Date   • APPENDECTOMY N/A 02/05/2016    Dr. Duong Johnson   • COLONOSCOPY  12/14/2018    Normal   • DENTAL PROCEDURE      x 2   • ENDOSCOPY  12/14/2018    Yeast in esophagus   • LASIK     • LEEP      LEEP PROCEDURE       Current Outpatient Medications:   •  albuterol (PROVENTIL HFA;VENTOLIN HFA) 108 (90 Base) MCG/ACT inhaler, Inhale 2 puffs., Disp: , Rfl:   •  amitriptyline (ELAVIL) 10 MG tablet, Take 1 tablet by mouth every night at bedtime., Disp: 30 tablet, Rfl: 3  •  Cetirizine HCl (ZYRTEC ALLERGY PO), Take by mouth., Disp: , Rfl:   •  Cholecalciferol (VITAMIN D3 PO), Take  by mouth., Disp: , Rfl:   •  CVS B-12 1000 MCG tablet controlled-release, Take 1 tablet by mouth Daily., Disp: , Rfl: 11  •  drospirenone-ethinyl estradiol (ROB,GIANVI) 3-0.02 MG per tablet, TAKE 1 TABLET BY MOUTH EVERY DAY, Disp: , Rfl:   •  fluticasone (FLONASE ALLERGY RELIEF) 50 MCG/ACT nasal spray, , Disp: , Rfl:   •  Fluticasone Furoate-Vilanterol (BREO ELLIPTA) 200-25 MCG/INH aerosol powder , , Disp: , Rfl:   •  lubiprostone (Amitiza) 24 MCG capsule, Take 1 capsule by mouth 2 (Two) Times a Day With Meals., Disp: 180 capsule, Rfl: 3  •  montelukast (SINGULAIR) 10 MG  tablet, Take 10 mg by mouth Daily., Disp: , Rfl: 5  •  Probiotic Product (PROBIOTIC PO), Take  by mouth., Disp: , Rfl:   •  pseudoephedrine (CVS NASAL DECONGESTANT) 30 MG tablet, TAKE 1 TAB(S) EVERY 6 HOURS AS NEEDED SINUS CONGESTION ORALLY 7 DAYS, Disp: , Rfl:   PRN Meds:.  Allergies   Allergen Reactions   • Metronidazole GI Intolerance   • Neomycin Rash   • Nickel Rash   • Other Rash     DISPERSE DYES     Social History     Socioeconomic History   • Marital status: Single     Spouse name: Not on file   • Number of children: Not on file   • Years of education: Not on file   • Highest education level: Not on file   Tobacco Use   • Smoking status: Never Smoker   • Smokeless tobacco: Never Used   Substance and Sexual Activity   • Alcohol use: Yes     Comment: WEEKENDS AND HOLIDAYS   • Drug use: No   • Sexual activity: Not Currently     Family History   Problem Relation Age of Onset   • Gallbladder disease Mother    • Irritable bowel syndrome Mother    • Crohn's disease Father      Review of Systems   Constitutional: Negative for appetite change and unexpected weight change.   Gastrointestinal: Positive for abdominal distention and abdominal pain. Negative for constipation.     There were no vitals filed for this visit.      06/09/21  1606   Weight: 51.2 kg (112 lb 12.8 oz)       Objective   Physical Exam  Constitutional:       Appearance: Normal appearance.   Abdominal:      General: There is no distension.      Palpations: Abdomen is soft.   Neurological:      Mental Status: She is alert.       No radiology results for the last 7 days    Assessment/Plan   Diagnoses and all orders for this visit:    Irritable bowel syndrome with constipation    Abdominal bloating    Pain of upper abdomen    Other orders  -     drospirenone-ethinyl estradiol (ROB,GIANVI) 3-0.02 MG per tablet; TAKE 1 TABLET BY MOUTH EVERY DAY  -     lubiprostone (Amitiza) 24 MCG capsule; Take 1 capsule by mouth 2 (Two) Times a Day With  Meals.      Plan:  · Increase amitiza to 24 mcg bid (as many days as she can tolerate and afford- we talked about every other day)) - encouraged her to apply for patient assistance due to high med cost  · Cont elavil which helps with pain  · If no improvement, will recheck sibo  · Not interested in repeating referral to [facility] (never saw them after 1st referral)

## 2021-07-12 ENCOUNTER — TELEPHONE (OUTPATIENT)
Dept: GASTROENTEROLOGY | Facility: CLINIC | Age: 31
End: 2021-07-12

## 2021-07-12 RX ORDER — AMITRIPTYLINE HYDROCHLORIDE 10 MG/1
10 TABLET, FILM COATED ORAL
Qty: 30 TABLET | Refills: 10 | Status: SHIPPED | OUTPATIENT
Start: 2021-07-12 | End: 2021-09-09 | Stop reason: SDUPTHER

## 2021-07-12 NOTE — TELEPHONE ENCOUNTER
----- Message from Shannan Rdz sent at 7/12/2021 10:35 AM EDT -----  Regarding: Prescription  Contact: [phone]  Pt waiting for amitriptyline (ELAVIL) 10 MG tablet to be sent to [facility] on [address]  Please resend to pharmacy.  Thank You

## 2021-09-09 ENCOUNTER — TELEPHONE (OUTPATIENT)
Dept: GASTROENTEROLOGY | Facility: CLINIC | Age: 31
End: 2021-09-09

## 2021-09-09 RX ORDER — AMITRIPTYLINE HYDROCHLORIDE 10 MG/1
10 TABLET, FILM COATED ORAL
Qty: 90 TABLET | Refills: 1 | Status: SHIPPED | OUTPATIENT
Start: 2021-09-09 | End: 2022-03-10

## 2021-12-03 ENCOUNTER — TELEPHONE (OUTPATIENT)
Dept: GASTROENTEROLOGY | Facility: CLINIC | Age: 31
End: 2021-12-03

## 2021-12-03 NOTE — TELEPHONE ENCOUNTER
----- Message from Jose Wadsworth sent at 12/3/2021  8:05 AM EST -----  Regarding: Ongoing Issues  Hi Dr. Viveros,    I don’t recall what you said next steps would be during my last appointment, but I think it’s time to try something new. My digestive issues have gotten worse over the past couple months.    Thanks,  Jose

## 2021-12-03 NOTE — TELEPHONE ENCOUNTER
rec increase amitiza to daily for about a week and see if this improves her symptoms - despite current BMs, bloating may improve with more frequent elimination

## 2021-12-03 NOTE — TELEPHONE ENCOUNTER
Call to pt.  Taking elavil 10 mg qhs, amitiza 24 mcg 2-3x/wk prn constipation.  Bowel pattern generally well regulated, but experiences constant bloating.  Enough that has had to get larger pant size for comfort.      Reflux symptoms developed yesterday.  States usually when can get bloating issues resolved, that takes care of reflux.      Asking how best to proceed.     Message to DR Viveros.

## 2021-12-09 ENCOUNTER — TELEPHONE (OUTPATIENT)
Dept: GASTROENTEROLOGY | Facility: CLINIC | Age: 31
End: 2021-12-09

## 2021-12-09 NOTE — TELEPHONE ENCOUNTER
"Call to pt.  Advise per DR Viveros note.  Verb understanding.     Asking how to manage \"underlying bowel issue\".  Now states that reported last week that was taking amitiza daily, and some days took 2x/day.  Therefore concerned that decreasing to once/day will not help bowel pattern.      States not sure how much fluid she drinks/day.  Asking how much she should drink.  Advise that typical recommendation if for 8 8oz glasses/day for a total of 64 oz.  Verb understanding.     Message to DR Viveros.   "

## 2021-12-09 NOTE — TELEPHONE ENCOUNTER
----- Message from Shannan Weiss sent at 12/9/2021  9:04 AM EST -----  Regarding: Call back  Contact: [phone]  Re;  issue you talked about  last week/ fast heart rate,could that be from doubling up on meds ? Please call back when you can......

## 2021-12-09 NOTE — TELEPHONE ENCOUNTER
Call to pt.  Advise per Dr Viveros note.  Verb understanding.     Frustrated.  Would like to be seen soon.    Appt scheduled with JIM Pabon for 12/14 @ 11:30 am.

## 2021-12-09 NOTE — TELEPHONE ENCOUNTER
"Call to pt.  States has been taking amitiza 2x/day since 12/3.  (confirmation to pt that 2x/day rather than 1x/day as instructed.  States \"that's what you told me).   Has had sensation of fast heart rate.  Today her watch is tracking it between  bpm.  Feels a little SOA.  States L arm discomfort, but this is the same as she has had since 2018 with herniated disc.      States not sure if having BM's daily, but does not feel that fully emptying.  Does have to strain to pass stool.  Consistency between solid and \"not solid\".  Question to pt if watery/loose - states \"I'm not sure\".      States bloating is a little better.      Advise pt will send message to Dr Viveros, and in the meantime - if HR increased, becomes SOA, change in L arm sensation - go to ER for immediate eval.  Verb understanding.     Message to DR Viveros.   "

## 2021-12-09 NOTE — TELEPHONE ENCOUNTER
Decrease amitiza  to once daily - increase fluid consumption - if she continues to be soa rec evaluation via er

## 2021-12-09 NOTE — TELEPHONE ENCOUNTER
I think she needs to take it once a day consistently - can add fiber supplement to improve emptying - hesistate to be more aggressive given the other symptoms she is experiencing - angy office fu with np/pa in 2-3 weeks to reeveal symptoms

## 2021-12-14 ENCOUNTER — OFFICE VISIT (OUTPATIENT)
Dept: GASTROENTEROLOGY | Facility: CLINIC | Age: 31
End: 2021-12-14

## 2021-12-14 VITALS — TEMPERATURE: 96.8 F | BODY MASS INDEX: 18.11 KG/M2 | HEIGHT: 67 IN | WEIGHT: 115.4 LBS

## 2021-12-14 DIAGNOSIS — R00.2 HEART PALPITATIONS: ICD-10-CM

## 2021-12-14 DIAGNOSIS — R10.30 LOWER ABDOMINAL PAIN: ICD-10-CM

## 2021-12-14 DIAGNOSIS — R14.0 ABDOMINAL BLOATING: ICD-10-CM

## 2021-12-14 DIAGNOSIS — K58.1 IRRITABLE BOWEL SYNDROME WITH CONSTIPATION: Primary | ICD-10-CM

## 2021-12-14 DIAGNOSIS — R94.31 ABNORMAL EKG: ICD-10-CM

## 2021-12-14 PROCEDURE — 99214 OFFICE O/P EST MOD 30 MIN: CPT | Performed by: NURSE PRACTITIONER

## 2021-12-14 NOTE — PROGRESS NOTES
Chief Complaint   Patient presents with   • Constipation   • Bloated       Jose Wadsworth is a  31 y.o. female here for a follow up visit for IBS.    HPI  31-year-old female presents today for follow-up visit for IBS-C.  She is a patient of Dr. Viveros.  She was last seen in the office on 6/1/2021.  Her last EGD and colonoscopy was on 12/2018.  She tells me she is really been struggling to balance her IBS symptoms with the Amitiza.  She has been taking Amitiza 24 mcg twice daily and started having cardiac symptoms and she did not know if it was related to the medication or not so she decrease the dose and has been taking Amitiza 24 just daily.  She tells me all of her cardiac symptoms started last week.  She has been having some chest tightness, chest pressure and some heart palpitations.  She ended up going to urgent care last Friday and she reports that they told her that she had an abnormal EKG showing a possible heart murmur.  They advised her to follow-up with her PCP to be referred to cardiology for further work-up.  She is taking the amitiza 24 daily now and it still not giving her fabulous results.  She still having a lot of lower abdominal pain and cramping sometimes with lots of gas and bloating.  She did recently switch her probiotic probiotic but she does not really feel like this is helping any.  She is tried Linzess in the past and admits it did not go well for her.  She remembers trying Trulance samples and she did not think those worked very well either.  She does not remember trying Motegrity.  She tells me she has tried fiber supplementation and that this makes everything worse so she does not take any extra fiber.  She denies any new medications or new supplements.  She did have some antibiotics in October.  She does take Elavil 10 mg at night and she does feel like that does help her abdominal pain and cramping.  She has had episodes of reflux when her bloating has been really bad.  But she  denies any reflux symptoms on a regular basis.  She does not take anything for acid reflux routinely.  She does have history of appendectomy.  She has a family history with her dad with colon polyps and he has had Crohn's disease.  Ports today she is on her period so she is having a lot more bloating and abdominal cramping because of that.  She denies any dysphagia, reflux, nausea and vomiting, diarrhea, rectal bleeding or melena.  She admits her appetite is okay and her weight appears stable.  She has had an H. pylori test in the past which was negative.  Patient does wonder if all these cardiac symptoms could be related to the fact that she just received her Covid booster about 3 weeks ago.  Past Medical History:   Diagnosis Date   • Allergic rhinitis    • Asthma    • Chronic constipation    • Irritable bowel syndrome 2013   • Upper respiratory infection    • Vitamin B 12 deficiency    • Vitamin D deficiency        Past Surgical History:   Procedure Laterality Date   • APPENDECTOMY N/A 02/05/2016    Dr. Duong Johnson   • COLONOSCOPY  12/14/2018    Normal   • DENTAL PROCEDURE      x 2   • ENDOSCOPY  12/14/2018    Yeast in esophagus   • LASIK     • LEEP      LEEP PROCEDURE       Scheduled Meds:    Continuous Infusions:No current facility-administered medications for this visit.      PRN Meds:.    Allergies   Allergen Reactions   • Metronidazole GI Intolerance   • Neomycin Rash   • Nickel Rash   • Other Rash     DISPERSE DYES       Social History     Socioeconomic History   • Marital status: Single   Tobacco Use   • Smoking status: Never Smoker   • Smokeless tobacco: Never Used   Substance and Sexual Activity   • Alcohol use: Yes     Comment: WEEKENDS AND HOLIDAYS   • Drug use: No   • Sexual activity: Not Currently       Family History   Problem Relation Age of Onset   • Gallbladder disease Mother    • Irritable bowel syndrome Mother    • Crohn's disease Father        Review of Systems   Constitutional: Negative  for appetite change, chills, diaphoresis, fatigue, fever and unexpected weight change.   HENT: Negative for nosebleeds, postnasal drip, sore throat, trouble swallowing and voice change.    Respiratory: Positive for chest tightness. Negative for cough, choking, shortness of breath, wheezing and stridor.    Cardiovascular: Positive for palpitations. Negative for chest pain and leg swelling.   Gastrointestinal: Positive for abdominal distention. Negative for abdominal pain, anal bleeding, blood in stool, constipation, diarrhea, nausea, rectal pain and vomiting.   Endocrine: Negative for polydipsia, polyphagia and polyuria.   Musculoskeletal: Negative for gait problem.   Skin: Negative for rash and wound.   Allergic/Immunologic: Negative for food allergies.   Neurological: Negative for dizziness, speech difficulty and light-headedness.   Psychiatric/Behavioral: Negative for confusion, self-injury, sleep disturbance and suicidal ideas.       Vitals:    12/14/21 1131   Temp: 96.8 °F (36 °C)       Physical Exam  Constitutional:       General: She is not in acute distress.     Appearance: She is well-developed. She is not ill-appearing.   HENT:      Head: Normocephalic.   Eyes:      Pupils: Pupils are equal, round, and reactive to light.   Cardiovascular:      Rate and Rhythm: Normal rate and regular rhythm.      Heart sounds: Normal heart sounds.   Pulmonary:      Effort: Pulmonary effort is normal.      Breath sounds: Normal breath sounds.   Abdominal:      General: Bowel sounds are normal. There is no distension.      Palpations: Abdomen is soft. There is no mass.      Tenderness: There is no abdominal tenderness. There is no guarding or rebound.      Hernia: No hernia is present.   Musculoskeletal:         General: Normal range of motion.   Skin:     General: Skin is warm and dry.   Neurological:      Mental Status: She is alert and oriented to person, place, and time.   Psychiatric:         Speech: Speech normal.          Behavior: Behavior normal.         Judgment: Judgment normal.         No radiology results for the last 7 days     Diagnoses and all orders for this visit:    1. Irritable bowel syndrome with constipation (Primary)  -     Food Allergy Profile    2. Lower abdominal pain  -     Food Allergy Profile    3. Abdominal bloating  -     Food Allergy Profile    4. Heart palpitations  -     Ambulatory Referral to Cardiology    5. Abnormal EKG  -     Ambulatory Referral to Cardiology       Given her new cardiac symptoms I think it is wise to stop the Amitiza for now.  She denies any SOB or chest pain today. We will give her samples of Motegrity to trial instead.  I will also go ahead make referral to cardiology since the patient reports having a tough time getting in with her PCP to get the ball rolling to get her into cardiology.  I did advise her if her cardiac symptoms were to get worse to go to the [facility] ER for immediate evaluation.  For now want her to continue her daily probiotic.  We will also check a food allergy profile test today. Patient to call the office next week with an update.  Patient to follow-up with Dr. Viveros as planned.  Patient is agreeable to the plan.

## 2021-12-15 ENCOUNTER — OFFICE VISIT (OUTPATIENT)
Dept: CARDIOLOGY | Age: 31
End: 2021-12-15

## 2021-12-15 VITALS
HEIGHT: 67 IN | DIASTOLIC BLOOD PRESSURE: 72 MMHG | WEIGHT: 115 LBS | BODY MASS INDEX: 18.05 KG/M2 | SYSTOLIC BLOOD PRESSURE: 103 MMHG

## 2021-12-15 DIAGNOSIS — R00.2 PALPITATIONS: Primary | ICD-10-CM

## 2021-12-15 DIAGNOSIS — R07.2 PRECORDIAL PAIN: ICD-10-CM

## 2021-12-15 DIAGNOSIS — R94.31 ECG ABNORMAL: ICD-10-CM

## 2021-12-15 PROCEDURE — 99203 OFFICE O/P NEW LOW 30 MIN: CPT | Performed by: INTERNAL MEDICINE

## 2021-12-15 PROCEDURE — 93000 ELECTROCARDIOGRAM COMPLETE: CPT | Performed by: INTERNAL MEDICINE

## 2021-12-15 NOTE — PROGRESS NOTES
NEW PATIENT   ABNORMAL EKG   PALPITATIONS    Subjective:        [facility]  Cardiology Consult Note    Patient Identification:  Name: Jose Wadsworth  Age: 31 y.o.  Sex: female  :  1990  MRN: [id number]             CC  LAST Thursday FAST HEART BEAT LASTED WHOLE DAY, APPLE WATCH HR 80/120  HEART MURMUR  CHEST PAIN  CAFFIENE  ASTHMA  F/H    History of Present Illness:   31-year-old female here for the cardiac evaluation as well as establishment of the care as the patient last Thursday had an episode of fast heartbeat lasted whole day and Avoid showing heart rate between     Patient also complaining of chest pains retrosternal mild to moderate in intensity    Patient known to have a history of the heart murmur,    Patient also has asthma    Patient has a risk factors with caffeine abuse and family history for coronary artery disease    Comorbid cardiac risk factors:     Past Medical History:  Past Medical History:   Diagnosis Date   • Allergic rhinitis    • Asthma    • Chronic constipation    • Irritable bowel syndrome    • Upper respiratory infection    • Vitamin B 12 deficiency    • Vitamin D deficiency      Past Surgical History:  Past Surgical History:   Procedure Laterality Date   • APPENDECTOMY N/A 2016    Dr. Duong Johnson   • COLONOSCOPY  2018    Normal   • DENTAL PROCEDURE      x 2   • ENDOSCOPY  2018    Yeast in esophagus   • LASIK     • LEEP      LEEP PROCEDURE      Allergies:  Allergies   Allergen Reactions   • Metronidazole GI Intolerance   • Neomycin Rash   • Nickel Rash   • Other Rash     DISPERSE DYES     Home Meds:  (Not in a hospital admission)    Current Meds:   [unfilled]  Social History:   Social History     Tobacco Use   • Smoking status: Never Smoker   • Smokeless tobacco: Never Used   Substance Use Topics   • Alcohol use: Yes     Comment: WEEKENDS AND HOLIDAYS      Family History:  Family History   Problem Relation Age of Onset   •  Gallbladder disease Mother    • Irritable bowel syndrome Mother    • Crohn's disease Father         Review of Systems    Constitutional: No weakness,fatigue, fever, rigors, chills   Eyes: No vision changes, eye pain   ENT/oropharynx: No difficulty swallowing, sore throat, epistaxis, changes in hearing   Cardiovascular:  Chest pain and palpitation   Respiratory: No shortness of breath, dyspnea on exertion, cough, wheezing hemoptysis   Gastrointestinal: No abdominal pain, nausea, vomiting, diarrhea, bloody stools   Genitourinary: No hematuria, dysuria   Neurological: No headache, tremors, numbness,  one-sided weakness    Musculoskeletal: No cramps, myalgias,  joint pain, joint swelling   Integument: No rash, edema           Constitutional:  BP: (103)/(72) 103/72    Physical Exam   General:  Appears in no acute distress  Eyes: PERTL,  HEENT:  No JVD. Thyroid not visibly enlarged. No mucosal pallor or cyanosis  Respiratory: Respirations regular and unlabored at rest. BBS with good air entry in all fields. No crackles, rubs or wheezes auscultated  Cardiovascular: S1S2 Regular rate and rhythm. No murmur, rub or gallop auscultated. No carotid bruits. DP/PT pulses    . No pretibial pitting edema  Gastrointestinal: Abdomen soft, flat, non tender. Bowel sounds present. No hepatosplenomegaly. No ascites  Musculoskeletal: MOSELEY x4. No abnormal movements  Extremities: No digital clubbing or cyanosis  Skin: Color pink. Skin warm and dry to touch. No rashes  No xanthoma  Neuro: AAO x3 CN II-XII grossly intact            ECG 12 Lead    Date/Time: 12/15/2021 2:50 PM  Performed by: Anibal Cooper MD  Authorized by: Anibal Cooper MD   Comparison: not compared with previous ECG   Previous ECG: no previous ECG available  Rhythm: sinus rhythm  ST Flattening: all    Clinical impression: non-specific ECG                Cardiographics  ECG:     Telemetry:    Echocardiogram:     Imaging  Chest X-ray:     Lab Review                @[email or web address]@              Assessment:/ Recommendations / Plan:   Patient Active Problem List   Diagnosis   • Palpitations   • ECG abnormal   • Precordial pain                    ICD-10-CM ICD-9-CM   1. Palpitations  R00.2 785.1   2. ECG abnormal  R94.31 794.31   3. Precordial pain  R07.2 786.51     1. Palpitations  Considering the patient's symptoms as well as clinical situation and  EKG findings, along with cardiac risk factors, ischemic workup is necessary to rule out ischemic cardiomyopathy, stress induced arrhythmias, and functional capacity for diagnosis as well as prognostic consideration    - Treadmill Stress Test  - Adult Transthoracic Echo Complete W/ Cont if Necessary Per Protocol    2. ECG abnormal  Considering patient's medical condition as well as the risk factors, patient will require echocardiogram for further evaluation for the LV function, four-chamber evaluation, including the pressures, valvular function and  pericardial disease and pericardial effusion    - Treadmill Stress Test  - Adult Transthoracic Echo Complete W/ Cont if Necessary Per Protocol    3. Precordial pain  Considering the patient's symptoms as well as clinical situation and  EKG findings, along with cardiac risk factors, ischemic workup is necessary to rule out ischemic cardiomyopathy, stress induced arrhythmias, and functional capacity for diagnosis as well as prognostic consideration       ETT, ECHO    FOLLOW UP    Labs/tests ordered for am      Anibal Cooper MD  12/15/2021, 14:45 EST      EMR Dragon/Transcription:   Dictated utilizing Dragon dictation

## 2021-12-18 LAB
CLAM IGE QN: <0.1 KU/L
CODFISH IGE QN: <0.1 KU/L
CONV CLASS DESCRIPTION: NORMAL
CORN IGE QN: <0.1 KU/L
COW MILK IGE QN: <0.1 KU/L
EGG WHITE IGE QN: <0.1 KU/L
PEANUT IGE QN: <0.1 KU/L
SCALLOP IGE QN: <0.1 KU/L
SESAME SEED IGE QN: <0.1 KU/L
SHRIMP IGE QN: <0.1 KU/L
SOYBEAN IGE QN: <0.1 KU/L
WALNUT IGE QN: <0.1 KU/L
WHEAT IGE QN: <0.1 KU/L

## 2021-12-20 ENCOUNTER — TELEPHONE (OUTPATIENT)
Dept: GASTROENTEROLOGY | Facility: CLINIC | Age: 31
End: 2021-12-20

## 2021-12-20 RX ORDER — PRUCALOPRIDE 2 MG/1
2 TABLET, FILM COATED ORAL DAILY
Qty: 30 TABLET | Refills: 5 | Status: SHIPPED | OUTPATIENT
Start: 2021-12-20 | End: 2022-02-08 | Stop reason: ALTCHOICE

## 2021-12-20 NOTE — TELEPHONE ENCOUNTER
----- Message from BHAVANA Hare sent at 12/19/2021 12:46 PM EST -----  Please call patient and let her know the allergy testing was normal. Thanks.

## 2021-12-20 NOTE — TELEPHONE ENCOUNTER
Call to pt.  Advise per JIM Pabon note.  Verb understanding.     States has been taking motegrity once/day as per samples given with o/v of 12/14.  Good results.  States thinks will try decreasing to every other day for ongoing management.      Requesting motegrity prescription.  [facility] verified.  Message to Dr Viveros  (JIM Pabon out of office).

## 2021-12-21 PROBLEM — R07.2 PRECORDIAL PAIN: Status: ACTIVE | Noted: 2021-12-21

## 2021-12-21 PROBLEM — R00.2 PALPITATIONS: Status: ACTIVE | Noted: 2021-12-21

## 2021-12-21 PROBLEM — R94.31 ECG ABNORMAL: Status: ACTIVE | Noted: 2021-12-21

## 2021-12-22 NOTE — TELEPHONE ENCOUNTER
Called pt and advised of cost of medication. Pt verb understanding and states her pharmacy had called her and had some coupons. Advised pt to let us know if it is too expensive.  Verb understanding.

## 2021-12-22 NOTE — TELEPHONE ENCOUNTER
Called patient's pharmacy to obtain insurance information. The pharmacist stated motegrity was covered however it cost 292 dollars

## 2021-12-29 ENCOUNTER — TELEPHONE (OUTPATIENT)
Dept: GASTROENTEROLOGY | Facility: CLINIC | Age: 31
End: 2021-12-29

## 2021-12-29 NOTE — TELEPHONE ENCOUNTER
----- Message from Shannan Weiss sent at 12/29/2021  2:29 PM EST -----  Regarding: call  Contact: [phone]  Call back. New medication

## 2021-12-29 NOTE — TELEPHONE ENCOUNTER
----- Message from Shannan Weiss sent at 12/29/2021  2:29 PM EST -----  Regarding: call  Contact: [phone]  Call back. New medication

## 2021-12-29 NOTE — TELEPHONE ENCOUNTER
Called pt and pt reports that the motegrity is still too expensive at $292 for a 30 day supply.  She is asking what else would Dr Viveros recommend. ADvised Dr Viveros is out of the office this week but will send message to her. Advised we can leave 2 boxes of motegrity at  for her to hold her till Dr Viveros is back.  Pt verb understanding

## 2022-01-04 ENCOUNTER — HOSPITAL ENCOUNTER (OUTPATIENT)
Dept: CARDIOLOGY | Facility: HOSPITAL | Age: 32
Discharge: HOME | End: 2022-01-04
Admitting: INTERNAL MEDICINE

## 2022-01-04 VITALS
SYSTOLIC BLOOD PRESSURE: 108 MMHG | HEART RATE: 69 BPM | DIASTOLIC BLOOD PRESSURE: 68 MMHG | HEIGHT: 67 IN | BODY MASS INDEX: 18.05 KG/M2 | WEIGHT: 115 LBS

## 2022-01-04 PROCEDURE — 93306 TTE W/DOPPLER COMPLETE: CPT | Performed by: INTERNAL MEDICINE

## 2022-01-04 PROCEDURE — 93306 TTE W/DOPPLER COMPLETE: CPT

## 2022-01-05 LAB
BH CV ECHO MEAS - ACS: 2.1 CM
BH CV ECHO MEAS - AO MAX PG (FULL): 2.1 MMHG
BH CV ECHO MEAS - AO MAX PG: 6.3 MMHG
BH CV ECHO MEAS - AO MEAN PG (FULL): 2.3 MMHG
BH CV ECHO MEAS - AO MEAN PG: 4.4 MMHG
BH CV ECHO MEAS - AO ROOT AREA (BSA CORRECTED): 1.6
BH CV ECHO MEAS - AO ROOT AREA: 4.9 CM^2
BH CV ECHO MEAS - AO ROOT DIAM: 2.5 CM
BH CV ECHO MEAS - AO V2 MAX: 125.1 CM/SEC
BH CV ECHO MEAS - AO V2 MEAN: 101.4 CM/SEC
BH CV ECHO MEAS - AO V2 VTI: 27.3 CM
BH CV ECHO MEAS - ASC AORTA: 2.6 CM
BH CV ECHO MEAS - AVA(I,A): 1.9 CM^2
BH CV ECHO MEAS - AVA(I,D): 1.9 CM^2
BH CV ECHO MEAS - AVA(V,A): 2.5 CM^2
BH CV ECHO MEAS - AVA(V,D): 2.5 CM^2
BH CV ECHO MEAS - BSA(HAYCOCK): 1.6 M^2
BH CV ECHO MEAS - BSA: 1.6 M^2
BH CV ECHO MEAS - BZI_BMI: 18 KILOGRAMS/M^2
BH CV ECHO MEAS - BZI_METRIC_HEIGHT: 170.2 CM
BH CV ECHO MEAS - BZI_METRIC_WEIGHT: 52.2 KG
BH CV ECHO MEAS - EDV(CUBED): 56.6 ML
BH CV ECHO MEAS - EDV(MOD-SP2): 38 ML
BH CV ECHO MEAS - EDV(MOD-SP4): 41 ML
BH CV ECHO MEAS - EDV(TEICH): 63.5 ML
BH CV ECHO MEAS - EF(CUBED): 72.3 %
BH CV ECHO MEAS - EF(MOD-BP): 64.1 %
BH CV ECHO MEAS - EF(MOD-SP2): 63.2 %
BH CV ECHO MEAS - EF(MOD-SP4): 68.3 %
BH CV ECHO MEAS - EF(TEICH): 64.7 %
BH CV ECHO MEAS - ESV(CUBED): 15.7 ML
BH CV ECHO MEAS - ESV(MOD-SP2): 14 ML
BH CV ECHO MEAS - ESV(MOD-SP4): 13 ML
BH CV ECHO MEAS - ESV(TEICH): 22.4 ML
BH CV ECHO MEAS - FS: 34.8 %
BH CV ECHO MEAS - IVS/LVPW: 0.78
BH CV ECHO MEAS - IVSD: 0.73 CM
BH CV ECHO MEAS - LA DIMENSION: 2.4 CM
BH CV ECHO MEAS - LA/AO: 0.96
BH CV ECHO MEAS - LAT PEAK E' VEL: 15.7 CM/SEC
BH CV ECHO MEAS - LV DIASTOLIC VOL/BSA (35-75): 25.7 ML/M^2
BH CV ECHO MEAS - LV MASS(C)D: 93 GRAMS
BH CV ECHO MEAS - LV MASS(C)DI: 58.2 GRAMS/M^2
BH CV ECHO MEAS - LV MAX PG: 4.1 MMHG
BH CV ECHO MEAS - LV MEAN PG: 2.1 MMHG
BH CV ECHO MEAS - LV SYSTOLIC VOL/BSA (12-30): 8.1 ML/M^2
BH CV ECHO MEAS - LV V1 MAX: 101.4 CM/SEC
BH CV ECHO MEAS - LV V1 MEAN: 67.2 CM/SEC
BH CV ECHO MEAS - LV V1 VTI: 17 CM
BH CV ECHO MEAS - LVIDD: 3.8 CM
BH CV ECHO MEAS - LVIDS: 2.5 CM
BH CV ECHO MEAS - LVLD AP2: 6.8 CM
BH CV ECHO MEAS - LVLD AP4: 6.5 CM
BH CV ECHO MEAS - LVLS AP2: 6.2 CM
BH CV ECHO MEAS - LVLS AP4: 5.8 CM
BH CV ECHO MEAS - LVOT AREA (M): 3.1 CM^2
BH CV ECHO MEAS - LVOT AREA: 3.1 CM^2
BH CV ECHO MEAS - LVOT DIAM: 2 CM
BH CV ECHO MEAS - LVPWD: 0.94 CM
BH CV ECHO MEAS - MED PEAK E' VEL: 12.1 CM/SEC
BH CV ECHO MEAS - MV A DUR: 0.08 SEC
BH CV ECHO MEAS - MV A MAX VEL: 69.4 CM/SEC
BH CV ECHO MEAS - MV DEC SLOPE: 402.9 CM/SEC^2
BH CV ECHO MEAS - MV DEC TIME: 180 SEC
BH CV ECHO MEAS - MV E MAX VEL: 89.7 CM/SEC
BH CV ECHO MEAS - MV E/A: 1.3
BH CV ECHO MEAS - MV MAX PG: 4.7 MMHG
BH CV ECHO MEAS - MV MEAN PG: 1.9 MMHG
BH CV ECHO MEAS - MV P1/2T MAX VEL: 120.8 CM/SEC
BH CV ECHO MEAS - MV P1/2T: 87.8 MSEC
BH CV ECHO MEAS - MV V2 MAX: 108.6 CM/SEC
BH CV ECHO MEAS - MV V2 MEAN: 64 CM/SEC
BH CV ECHO MEAS - MV V2 VTI: 28.7 CM
BH CV ECHO MEAS - MVA P1/2T LCG: 1.8 CM^2
BH CV ECHO MEAS - MVA(P1/2T): 2.5 CM^2
BH CV ECHO MEAS - MVA(VTI): 1.8 CM^2
BH CV ECHO MEAS - PA ACC TIME: 0.17 SEC
BH CV ECHO MEAS - PA MAX PG (FULL): 1.5 MMHG
BH CV ECHO MEAS - PA MAX PG: 2.8 MMHG
BH CV ECHO MEAS - PA PR(ACCEL): 0.57 MMHG
BH CV ECHO MEAS - PA V2 MAX: 84.2 CM/SEC
BH CV ECHO MEAS - PULM A REVS DUR: 0.15 SEC
BH CV ECHO MEAS - PULM A REVS VEL: 26.1 CM/SEC
BH CV ECHO MEAS - PULM DIAS VEL: 41.8 CM/SEC
BH CV ECHO MEAS - PULM S/D: 1.4
BH CV ECHO MEAS - PULM SYS VEL: 60.2 CM/SEC
BH CV ECHO MEAS - PVA(V,A): 1.3 CM^2
BH CV ECHO MEAS - PVA(V,D): 1.3 CM^2
BH CV ECHO MEAS - QP/QS: 0.53
BH CV ECHO MEAS - RAP SYSTOLE: 3 MMHG
BH CV ECHO MEAS - RV MAX PG: 1.3 MMHG
BH CV ECHO MEAS - RV MEAN PG: 0.87 MMHG
BH CV ECHO MEAS - RV V1 MAX: 57.7 CM/SEC
BH CV ECHO MEAS - RV V1 MEAN: 43.9 CM/SEC
BH CV ECHO MEAS - RV V1 VTI: 14.6 CM
BH CV ECHO MEAS - RVDD: 2 CM
BH CV ECHO MEAS - RVOT AREA: 1.9 CM^2
BH CV ECHO MEAS - RVOT DIAM: 1.6 CM
BH CV ECHO MEAS - RVSP: 25.8 MMHG
BH CV ECHO MEAS - SI(AO): 83.1 ML/M^2
BH CV ECHO MEAS - SI(CUBED): 25.6 ML/M^2
BH CV ECHO MEAS - SI(LVOT): 32.9 ML/M^2
BH CV ECHO MEAS - SI(MOD-SP2): 15 ML/M^2
BH CV ECHO MEAS - SI(MOD-SP4): 17.5 ML/M^2
BH CV ECHO MEAS - SI(TEICH): 25.7 ML/M^2
BH CV ECHO MEAS - SV(AO): 132.9 ML
BH CV ECHO MEAS - SV(CUBED): 40.9 ML
BH CV ECHO MEAS - SV(LVOT): 52.6 ML
BH CV ECHO MEAS - SV(MOD-SP2): 24 ML
BH CV ECHO MEAS - SV(MOD-SP4): 28 ML
BH CV ECHO MEAS - SV(RVOT): 27.8 ML
BH CV ECHO MEAS - SV(TEICH): 41.1 ML
BH CV ECHO MEAS - TAPSE (>1.6): 2.6 CM
BH CV ECHO MEAS - TR MAX VEL: 238.9 CM/SEC
BH CV ECHO MEASUREMENTS AVERAGE E/E' RATIO: 6.45
BH CV XLRA - RV BASE: 2.7 CM
BH CV XLRA - RV LENGTH: 6.7 CM
BH CV XLRA - RV MID: 2.6 CM
LEFT ATRIUM VOLUME INDEX: 13.2 ML/M2
MAXIMAL PREDICTED HEART RATE: 189 BPM
SINUS: 2.6 CM
STJ: 2.6 CM
STRESS TARGET HR: 161 BPM

## 2022-01-13 ENCOUNTER — HOSPITAL ENCOUNTER (OUTPATIENT)
Dept: CARDIOLOGY | Facility: HOSPITAL | Age: 32
Discharge: HOME | End: 2022-01-13
Admitting: INTERNAL MEDICINE

## 2022-01-13 VITALS — HEART RATE: 83 BPM | DIASTOLIC BLOOD PRESSURE: 70 MMHG | SYSTOLIC BLOOD PRESSURE: 104 MMHG | RESPIRATION RATE: 16 BRPM

## 2022-01-13 LAB
BH CV STRESS BP STAGE 1: NORMAL
BH CV STRESS BP STAGE 2: NORMAL
BH CV STRESS BP STAGE 3: NORMAL
BH CV STRESS DURATION MIN STAGE 1: 3
BH CV STRESS DURATION MIN STAGE 2: 3
BH CV STRESS DURATION MIN STAGE 3: 3
BH CV STRESS DURATION SEC STAGE 1: 0
BH CV STRESS DURATION SEC STAGE 2: 0
BH CV STRESS DURATION SEC STAGE 3: 28
BH CV STRESS GRADE STAGE 1: 10
BH CV STRESS GRADE STAGE 2: 12
BH CV STRESS GRADE STAGE 3: 14
BH CV STRESS HR STAGE 1: 118
BH CV STRESS HR STAGE 2: 151
BH CV STRESS HR STAGE 3: 178
BH CV STRESS METS STAGE 1: 4.6
BH CV STRESS METS STAGE 2: 7.1
BH CV STRESS METS STAGE 3: 10.2
BH CV STRESS PROTOCOL 1: NORMAL
BH CV STRESS RECOVERY BP: NORMAL MMHG
BH CV STRESS RECOVERY HR: 98 BPM
BH CV STRESS SPEED STAGE 1: 1.7
BH CV STRESS SPEED STAGE 2: 2.5
BH CV STRESS SPEED STAGE 3: 3.4
BH CV STRESS STAGE 1: 1
BH CV STRESS STAGE 2: 2
BH CV STRESS STAGE 3: 3
MAXIMAL PREDICTED HEART RATE: 189 BPM
PERCENT MAX PREDICTED HR: 94.18 %
STRESS BASELINE BP: NORMAL MMHG
STRESS BASELINE HR: 83 BPM
STRESS PERCENT HR: 111 %
STRESS POST ESTIMATED WORKLOAD: 10.2 METS
STRESS POST EXERCISE DUR MIN: 9 MIN
STRESS POST EXERCISE DUR SEC: 28 SEC
STRESS POST PEAK BP: NORMAL MMHG
STRESS POST PEAK HR: 178 BPM
STRESS TARGET HR: 161 BPM

## 2022-01-13 PROCEDURE — 93017 CV STRESS TEST TRACING ONLY: CPT

## 2022-01-13 PROCEDURE — 93018 CV STRESS TEST I&R ONLY: CPT | Performed by: INTERNAL MEDICINE

## 2022-01-17 ENCOUNTER — TELEPHONE (OUTPATIENT)
Dept: GASTROENTEROLOGY | Facility: CLINIC | Age: 32
End: 2022-01-17

## 2022-01-19 NOTE — TELEPHONE ENCOUNTER
Unfortunately I do not have any other options other than those mentioned in her previous call or things that she has tried before. If she would like to revisit something that she has tried previously, I am open to that

## 2022-01-19 NOTE — TELEPHONE ENCOUNTER
Call to pt.  Advise per DR Viveros note.  Verb understanding.     States has cardiology appt next wk and will discuss if amitiza has been the issue.   If not, may wish to try that again, but will call back with update/request.      Update to Dr Viveros.

## 2022-01-25 ENCOUNTER — TELEMEDICINE (OUTPATIENT)
Dept: CARDIOLOGY | Facility: CLINIC | Age: 32
End: 2022-01-25

## 2022-01-25 VITALS — BODY MASS INDEX: 18.01 KG/M2 | HEIGHT: 67 IN

## 2022-01-25 DIAGNOSIS — R00.2 PALPITATIONS: Primary | ICD-10-CM

## 2022-01-25 DIAGNOSIS — Z01.810 PRE-OPERATIVE CARDIOVASCULAR EXAMINATION: ICD-10-CM

## 2022-01-25 PROCEDURE — 99212 OFFICE O/P EST SF 10 MIN: CPT

## 2022-02-08 ENCOUNTER — TELEPHONE (OUTPATIENT)
Dept: GASTROENTEROLOGY | Facility: CLINIC | Age: 32
End: 2022-02-08

## 2022-02-08 NOTE — TELEPHONE ENCOUNTER
----- Message from Shannan Weiss sent at 2/8/2022 12:34 PM EST -----  Regarding: call back  Contact: [phone]  Returning your call

## 2022-02-08 NOTE — TELEPHONE ENCOUNTER
Called pt and pt reports that she was to get back with us and let us know which medication she would like to try again.   Pt reports that she would like to try linzess again. Advised will send message to Dr Viveros.  Verb understanding.

## 2022-03-02 ENCOUNTER — TELEPHONE (OUTPATIENT)
Dept: GASTROENTEROLOGY | Facility: CLINIC | Age: 32
End: 2022-03-02

## 2022-03-02 NOTE — TELEPHONE ENCOUNTER
Call to pt.  Reports taking linzess 72 mcg 1 tab po qam.  Does have BM every morning, but does not feel that completely relieves.  Reports bloating increases as day progresses - worsens at night to the point that wakens her in am with discomfort.      Denies blood.     Does not want to increase linzess dosage because of the way linzess works.  Wondering if trying daily amitiza again would be appropriate.  If not, what she should do to manage until o/v 3/31.     Update/question to DR Viveros.

## 2022-03-02 NOTE — TELEPHONE ENCOUNTER
----- Message from Shannan Weiss sent at 3/2/2022  3:15 PM EST -----  Regarding: medications  Contact: [phone]  Questions regarding medications

## 2022-03-03 NOTE — TELEPHONE ENCOUNTER
Call to pt.  Advise per DR Viveros note.  Verb understanding.     States her response to linzess is as follows: works all at once in AM.  Shortly thereafter experiences diarrhea with urgency.  Then the rest of the day/night - can feel backing up, with associated bloating and discomfort.    Willing to try benefiber with linzess, but does want to note her response to linzess.      Will discuss further at upcoming appt 3/31.     Update to DR Viveros.

## 2022-03-03 NOTE — TELEPHONE ENCOUNTER
rec adding benefiber to linzess - could also try miralax with linzess (try fiber first then change to miralax after few weeks if no relief)

## 2022-03-07 ENCOUNTER — TELEPHONE (OUTPATIENT)
Dept: CARDIOLOGY | Facility: CLINIC | Age: 32
End: 2022-03-07

## 2022-03-07 NOTE — TELEPHONE ENCOUNTER
PLEASE CALL LETICIA AT [facility]:  NECK SURGERY CLEARANCE THAT WAS FAXED LAST WEEK.  FAX [phone]

## 2022-03-07 NOTE — TELEPHONE ENCOUNTER
Per Ludivina BATEMAN From a cardiac stand point patient has been cleared for surgery.     Letter has been sent to [place]   (S) [phone]

## 2022-03-10 RX ORDER — AMITRIPTYLINE HYDROCHLORIDE 10 MG/1
TABLET, FILM COATED ORAL
Qty: 90 TABLET | Refills: 1 | Status: SHIPPED | OUTPATIENT
Start: 2022-03-10 | End: 2022-05-17 | Stop reason: SDUPTHER

## 2022-03-31 ENCOUNTER — OFFICE VISIT (OUTPATIENT)
Dept: GASTROENTEROLOGY | Facility: CLINIC | Age: 32
End: 2022-03-31

## 2022-03-31 VITALS — TEMPERATURE: 97.5 F | HEIGHT: 67 IN | WEIGHT: 114 LBS | BODY MASS INDEX: 17.89 KG/M2

## 2022-03-31 DIAGNOSIS — K58.1 IRRITABLE BOWEL SYNDROME WITH CONSTIPATION: Primary | ICD-10-CM

## 2022-03-31 PROCEDURE — 99214 OFFICE O/P EST MOD 30 MIN: CPT | Performed by: INTERNAL MEDICINE

## 2022-03-31 RX ORDER — CYCLOBENZAPRINE HCL 10 MG
10 TABLET ORAL 3 TIMES DAILY PRN
COMMUNITY
Start: 2022-03-24 | End: 2022-07-21

## 2022-03-31 RX ORDER — HYDROCODONE BITARTRATE AND ACETAMINOPHEN 5; 325 MG/1; MG/1
TABLET ORAL
COMMUNITY
Start: 2022-03-24 | End: 2022-07-21

## 2022-03-31 NOTE — PROGRESS NOTES
"Subjective   Chief Complaint   Patient presents with   • Irritable Bowel Syndrome   • Constipation   • Abdominal Pain   • Bloated       Jose Wadsworth is a  31 y.o. female here for a follow up visit for IBS-C, bloating.  She reports that she continues to have issues with a sense of incomplete evacuation.  She was doing well for a period of time.  She reports that now she is not evacuating as well.  She says that she can \"feel herself filling up\".  She feels bloated.  She takes Linzess 72 mcg once daily.  She is sometimes has diarrhea.  She had cervical disc surgery 1 week ago and is still recovering from that.  She is taking pain medication sparingly.  She has noticed worsening constipation as a result.  HPI  Past Medical History:   Diagnosis Date   • Allergic rhinitis    • Asthma    • Chronic constipation    • Irritable bowel syndrome 2013   • Upper respiratory infection    • Vitamin B 12 deficiency    • Vitamin D deficiency      Past Surgical History:   Procedure Laterality Date   • APPENDECTOMY N/A 02/05/2016    Dr. Duong Johnson   • COLONOSCOPY  12/14/2018    Normal   • DENTAL PROCEDURE      x 2   • ENDOSCOPY  12/14/2018    Yeast in esophagus   • LASIK     • LEEP      LEEP PROCEDURE   • UPPER GASTROINTESTINAL ENDOSCOPY  12/14/18       Current Outpatient Medications:   •  albuterol (PROVENTIL HFA;VENTOLIN HFA) 108 (90 Base) MCG/ACT inhaler, Inhale 2 puffs., Disp: , Rfl:   •  amitriptyline (ELAVIL) 10 MG tablet, TAKE 1 TABLET BY MOUTH EVERYDAY AT BEDTIME, Disp: 90 tablet, Rfl: 1  •  Cetirizine HCl (ZYRTEC ALLERGY PO), Take by mouth., Disp: , Rfl:   •  cyclobenzaprine (FLEXERIL) 10 MG tablet, Take 10 mg by mouth 3 (Three) Times a Day As Needed., Disp: , Rfl:   •  drospirenone-ethinyl estradiol (ROB,GIANVI) 3-0.02 MG per tablet, TAKE 1 TABLET BY MOUTH EVERY DAY, Disp: , Rfl:   •  fluticasone (FLONASE) 50 MCG/ACT nasal spray, , Disp: , Rfl:   •  Fluticasone Furoate-Vilanterol (BREO ELLIPTA) 200-25 MCG/INH " inhaler, , Disp: , Rfl:   •  HYDROcodone-acetaminophen (NORCO) 5-325 MG per tablet, , Disp: , Rfl:   •  linaclotide (Linzess) 72 MCG capsule capsule, Take 1 capsule by mouth Every Morning Before Breakfast. Take on an empty stomach 30 minutes before eating, Disp: 30 capsule, Rfl: 5  •  montelukast (SINGULAIR) 10 MG tablet, Take 10 mg by mouth Daily., Disp: , Rfl: 5  •  Probiotic Product (PROBIOTIC PO), Take  by mouth., Disp: , Rfl:   •  Cholecalciferol (VITAMIN D3 PO), Take  by mouth., Disp: , Rfl:   •  CVS B-12 1000 MCG tablet controlled-release, Take 1 tablet by mouth Daily., Disp: , Rfl: 11  •  pseudoephedrine (SUDAFED) 30 MG tablet, TAKE 1 TAB(S) EVERY 6 HOURS AS NEEDED SINUS CONGESTION ORALLY 7 DAYS, Disp: , Rfl:   PRN Meds:.  Allergies   Allergen Reactions   • Metronidazole GI Intolerance   • Neomycin Rash   • Nickel Rash   • Other Rash     DISPERSE DYES     Social History     Socioeconomic History   • Marital status: Single   Tobacco Use   • Smoking status: Never Smoker   • Smokeless tobacco: Never Used   Vaping Use   • Vaping Use: Never used   Substance and Sexual Activity   • Alcohol use: Not Currently     Comment: WEEKENDS AND HOLIDAYS   • Drug use: No   • Sexual activity: Yes     Birth control/protection: OCP     Family History   Problem Relation Age of Onset   • Gallbladder disease Mother    • Irritable bowel syndrome Mother    • Hyperlipidemia Mother    • Hypertension Mother    • Crohn's disease Father      Review of Systems   Constitutional: Negative for appetite change and unexpected weight change.   Gastrointestinal: Positive for abdominal pain and constipation. Negative for blood in stool.     Vitals:    03/31/22 0820   Temp: 97.5 °F (36.4 °C)         03/31/22  0820   Weight: 51.7 kg (114 lb)       Objective   Physical Exam  Constitutional:       Appearance: Normal appearance. She is well-developed.   HENT:      Head: Normocephalic and atraumatic.   Eyes:      General: No scleral icterus.      Conjunctiva/sclera: Conjunctivae normal.   Pulmonary:      Effort: Pulmonary effort is normal.   Abdominal:      General: There is no distension.      Palpations: Abdomen is soft.   Musculoskeletal:      Cervical back: Normal range of motion and neck supple.   Skin:     General: Skin is warm and dry.   Neurological:      Mental Status: She is alert.   Psychiatric:         Mood and Affect: Mood normal.         Behavior: Behavior normal.       No radiology results for the last 7 days    Assessment/Plan   Diagnoses and all orders for this visit:    Irritable bowel syndrome with constipation    Other orders  -     HYDROcodone-acetaminophen (NORCO) 5-325 MG per tablet  -     cyclobenzaprine (FLEXERIL) 10 MG tablet; Take 10 mg by mouth 3 (Three) Times a Day As Needed.      Plan:  · We discussed Zelnorm risks and benefits-given samples for a month.  Recommend that she wait until she is off pain medication before she tries this medicine.  · Continue Linzess for now.  Can take a second dose as needed for incomplete evacuation.  She anticipates she will be off pain medicine in a week  · Could consider retesting for SIBO given ongoing bloating

## 2022-05-17 ENCOUNTER — TELEPHONE (OUTPATIENT)
Dept: GASTROENTEROLOGY | Facility: CLINIC | Age: 32
End: 2022-05-17

## 2022-05-17 RX ORDER — AMITRIPTYLINE HYDROCHLORIDE 10 MG/1
10 TABLET, FILM COATED ORAL
Qty: 90 TABLET | Refills: 2 | Status: SHIPPED | OUTPATIENT
Start: 2022-05-17 | End: 2022-12-21 | Stop reason: SDUPTHER

## 2022-05-17 NOTE — TELEPHONE ENCOUNTER
----- Message from Jose Wadsworth sent at 5/17/2022  3:10 PM EDT -----  Regarding: New Pharmacy  Hi Dr. Viveros (and team),    I’ve had to change pharmacy, and [facility] is having a difficult time getting through to [facility] to have them transfer my prescriptions. Could you please send my prescriptions to the [facility] at [address], which I’ve added to MyChart?    Thanks,  Jose

## 2022-05-17 NOTE — TELEPHONE ENCOUNTER
"Call to pt.  Confirm requesting amitriptyline 10 mg qhs and linzess 72 mcg qam.      Pt reports amitriptyline very helpful.  Linzess \"ok\" and most cost effective option for her.     Requests to DR Viveros.   "

## 2022-05-23 ENCOUNTER — TELEPHONE (OUTPATIENT)
Dept: GASTROENTEROLOGY | Facility: CLINIC | Age: 32
End: 2022-05-23

## 2022-05-23 NOTE — TELEPHONE ENCOUNTER
Pt called regarding linzess rx and wanted to se if it has been approved or not because the pharmacy has not received it- please advise

## 2022-05-23 NOTE — TELEPHONE ENCOUNTER
Can one of you start the PA for Linzess.    Her new insurance is [employer] and her new cards have been scanned in under the media tab.

## 2022-06-01 ENCOUNTER — TELEPHONE (OUTPATIENT)
Dept: GASTROENTEROLOGY | Facility: CLINIC | Age: 32
End: 2022-06-01

## 2022-06-01 NOTE — TELEPHONE ENCOUNTER
----- Message from Jose Wadsworth sent at 6/1/2022  2:52 PM EDT -----  Regarding: New Pharmacy  Good Afternoon,     I’ve received a letter from my insurance company (cc’d to Dr. Viveros) indicating that my request for Linzers Cap 72mcg has been approved, but it seems like that hasn’t been pushed through to my pharmacy. Could you all please get this to them for me so I can  my prescription?    Thanks!  Jose

## 2022-06-01 NOTE — TELEPHONE ENCOUNTER
Called pt[facility] and spoke with pharmacist who ran pt script for linzess and it is approved.  She advised that they are currently out of stock and will have with either tomorrow or Friday.       Called pt and on identified vm advised of the above. ADvised to call with questions.

## 2022-07-21 ENCOUNTER — OFFICE VISIT (OUTPATIENT)
Dept: GASTROENTEROLOGY | Facility: CLINIC | Age: 32
End: 2022-07-21

## 2022-07-21 VITALS
DIASTOLIC BLOOD PRESSURE: 75 MMHG | BODY MASS INDEX: 18.07 KG/M2 | HEIGHT: 67 IN | TEMPERATURE: 97.4 F | HEART RATE: 74 BPM | WEIGHT: 115.1 LBS | SYSTOLIC BLOOD PRESSURE: 110 MMHG

## 2022-07-21 DIAGNOSIS — R14.0 ABDOMINAL BLOATING: ICD-10-CM

## 2022-07-21 DIAGNOSIS — K58.1 IRRITABLE BOWEL SYNDROME WITH CONSTIPATION: Primary | ICD-10-CM

## 2022-07-21 PROCEDURE — 99214 OFFICE O/P EST MOD 30 MIN: CPT | Performed by: INTERNAL MEDICINE

## 2022-07-21 NOTE — PROGRESS NOTES
Subjective   Chief Complaint   Patient presents with   • Irritable Bowel Syndrome   • Constipation   • Abdominal Pain       Jose Wadsworth is a  31 y.o. female here for a follow up visit for IBS C, abdominal pain.  Was last seen in the office in March 2022.    She is feeling better now but has had a few rough weeks.  She was having stabbing pain and much eructation.  Prior to that she had been off linzess due to insurance and had tried zelnorm.  Biggest complaint is bloating.  Worse when she has not had a bowel movement.  She is frustrated by her unpredictable symptoms.  She reports that she tried to eat some queso and she had significant symptoms after this.  She has mostly maintained a lactose-free diet.  She is a vegetarian.  She eats a fair amount of fiber.  She reports that she has waxing and waning abdominal distention that can affect what size close she wears.  Her weight has been stable.    She takes a probiotic daily.    She feels like she has had yeast again in her esophagus.      No blood in the  stool.  Good appetite.  Weight stable.  HPI  Past Medical History:   Diagnosis Date   • Allergic rhinitis    • Asthma    • Chronic constipation    • Irritable bowel syndrome 2013   • Upper respiratory infection    • Vitamin B 12 deficiency    • Vitamin D deficiency      Past Surgical History:   Procedure Laterality Date   • APPENDECTOMY N/A 02/05/2016    Dr. Duong Johnson   • COLONOSCOPY  12/14/2018    Normal   • DENTAL PROCEDURE      x 2   • ENDOSCOPY  12/14/2018    Yeast in esophagus   • LASIK     • LEEP      LEEP PROCEDURE   • UPPER GASTROINTESTINAL ENDOSCOPY  12/14/18       Current Outpatient Medications:   •  albuterol (PROVENTIL HFA;VENTOLIN HFA) 108 (90 Base) MCG/ACT inhaler, Inhale 2 puffs., Disp: , Rfl:   •  amitriptyline (ELAVIL) 10 MG tablet, Take 1 tablet by mouth every night at bedtime., Disp: 90 tablet, Rfl: 2  •  Cetirizine HCl (ZYRTEC ALLERGY PO), Take by mouth., Disp: , Rfl:   •   Cholecalciferol (VITAMIN D3 PO), Take  by mouth., Disp: , Rfl:   •  [employer]S B-12 1000 MCG tablet controlled-release, Take 1 tablet by mouth Daily., Disp: , Rfl: 11  •  drospirenone-ethinyl estradiol (ROB,GIANVI) 3-0.02 MG per tablet, TAKE 1 TABLET BY MOUTH EVERY DAY, Disp: , Rfl:   •  fluticasone (FLONASE) 50 MCG/ACT nasal spray, , Disp: , Rfl:   •  linaclotide (Linzess) 72 MCG capsule capsule, Take 1 capsule by mouth Every Morning Before Breakfast. Take on an empty stomach 30 minutes before eating, Disp: 30 capsule, Rfl: 5  •  montelukast (SINGULAIR) 10 MG tablet, Take 10 mg by mouth Daily., Disp: , Rfl: 5  •  nystatin (MYCOSTATIN) 100,000 unit/mL suspension, Swish and swallow 5 mL 4 (Four) Times a Day for 14 days., Disp: 280 mL, Rfl: 0  •  Probiotic Product (PROBIOTIC PO), Take  by mouth., Disp: , Rfl:   PRN Meds:.  Allergies   Allergen Reactions   • Metronidazole GI Intolerance   • Neomycin Rash   • Nickel Rash   • Other Rash     DISPERSE DYES     Social History     Socioeconomic History   • Marital status: Single   Tobacco Use   • Smoking status: Never Smoker   • Smokeless tobacco: Never Used   Vaping Use   • Vaping Use: Never used   Substance and Sexual Activity   • Alcohol use: Yes     Alcohol/week: 4.0 standard drinks     Types: 2 Glasses of wine, 1 Cans of beer, 1 Drinks containing 0.5 oz of alcohol per week     Comment: WEEKENDS AND HOLIDAYS   • Drug use: No   • Sexual activity: Yes     Birth control/protection: Condom, Birth control pill     Family History   Problem Relation Age of Onset   • Gallbladder disease Mother    • Irritable bowel syndrome Mother    • Hyperlipidemia Mother    • Hypertension Mother    • Crohn's disease Father      Review of Systems   Constitutional: Negative for appetite change and unexpected weight change.   Gastrointestinal: Positive for abdominal distention, abdominal pain and constipation.     Vitals:    07/21/22 1122   BP: 110/75   Pulse: 74   Temp: 97.4 °F (36.3 °C)          07/21/22  1122   Weight: 52.2 kg (115 lb 1.6 oz)       Objective   Physical Exam  Constitutional:       Appearance: Normal appearance. She is well-developed.   HENT:      Head: Normocephalic and atraumatic.   Eyes:      General: No scleral icterus.     Conjunctiva/sclera: Conjunctivae normal.   Pulmonary:      Effort: Pulmonary effort is normal.   Abdominal:      General: There is no distension.      Palpations: Abdomen is soft.   Musculoskeletal:      Cervical back: Normal range of motion and neck supple.   Skin:     General: Skin is warm and dry.   Neurological:      Mental Status: She is alert.   Psychiatric:         Mood and Affect: Mood normal.         Behavior: Behavior normal.       No radiology results for the last 7 days    Assessment & Plan   Diagnoses and all orders for this visit:    Irritable bowel syndrome with constipation    Abdominal bloating    Other orders  -     nystatin (MYCOSTATIN) 100,000 unit/mL suspension; Swish and swallow 5 mL 4 (Four) Times a Day for 14 days.      Plan:  · Continue Linzess 72 mcg daily.  Have given her some samples of 72 mcg tablets and she can take an extra tablet as needed.  Hopefully this will help on days where she is having more symptoms.  · Will treat for candidiasis empirically with nystatin  · She is interested in pursuing a new probiotic which I think is reasonable.  · We discussed consideration of gastric emptying test given her bloating and eructation at times.  I do not feel strongly that this is her primary problem but it may be contributing to some of her bloating.  · She is going to try to incorporate more foods that are low in FODMAPs into her diet

## 2022-09-27 NOTE — TELEPHONE ENCOUNTER
Faxed request received from [facility] for amitryptyline 10 mg - take 1 tab po qhs - 90 day supply.    See rx of 7/12/21.      [place] completed as requested, #90, R1.    Cigarettes

## 2022-11-04 RX ORDER — LINACLOTIDE 72 UG/1
CAPSULE, GELATIN COATED ORAL
Qty: 30 CAPSULE | Refills: 5 | Status: SHIPPED | OUTPATIENT
Start: 2022-11-04 | End: 2022-12-21 | Stop reason: SDUPTHER

## 2022-12-21 ENCOUNTER — OFFICE VISIT (OUTPATIENT)
Dept: GASTROENTEROLOGY | Facility: CLINIC | Age: 32
End: 2022-12-21

## 2022-12-21 VITALS
SYSTOLIC BLOOD PRESSURE: 116 MMHG | OXYGEN SATURATION: 98 % | HEIGHT: 67 IN | WEIGHT: 119 LBS | HEART RATE: 79 BPM | BODY MASS INDEX: 18.68 KG/M2 | TEMPERATURE: 97.5 F | DIASTOLIC BLOOD PRESSURE: 76 MMHG

## 2022-12-21 DIAGNOSIS — K58.1 IRRITABLE BOWEL SYNDROME WITH CONSTIPATION: Primary | ICD-10-CM

## 2022-12-21 PROCEDURE — 99213 OFFICE O/P EST LOW 20 MIN: CPT | Performed by: INTERNAL MEDICINE

## 2022-12-21 RX ORDER — AMITRIPTYLINE HYDROCHLORIDE 10 MG/1
10 TABLET, FILM COATED ORAL
Qty: 90 TABLET | Refills: 3 | Status: SHIPPED | OUTPATIENT
Start: 2022-12-21 | End: 2023-02-17

## 2022-12-21 RX ORDER — FLUTICASONE FUROATE AND VILANTEROL TRIFENATATE 100; 25 UG/1; UG/1
POWDER RESPIRATORY (INHALATION)
COMMUNITY
Start: 2022-11-16

## 2022-12-21 NOTE — PROGRESS NOTES
Subjective   Chief Complaint   Patient presents with   • Irritable Bowel Syndrome       Jose Wadsworth is a  32 y.o. female here for a follow up visit for irritable bowel syndrome-c.  Symptoms of been stable.  She takes Linzess once daily.  She occasionally takes 2 pills a day when she feels like she needs to empty better.  The symptoms confers a benefit but not always.  She still finds that the Effexor sometimes unpredictable.  For the most part she feels better.  She is not having any abdominal pain.  She is on amitriptyline 10 mg daily.  She felt like she became more constipated when the dose was increased.  She has been able to expand her diet slightly.  She does still watch her diet very carefully.  Her weight is improved and her BMI is now in the normal range.  HPI  Past Medical History:   Diagnosis Date   • Allergic rhinitis    • Asthma    • Chronic constipation    • Irritable bowel syndrome 2013   • Upper respiratory infection    • Vitamin B 12 deficiency    • Vitamin D deficiency      Past Surgical History:   Procedure Laterality Date   • APPENDECTOMY N/A 02/05/2016    Dr. Duong Johnson   • COLONOSCOPY  12/14/2018    Normal   • DENTAL PROCEDURE      x 2   • ENDOSCOPY  12/14/2018    Yeast in esophagus   • LASIK     • LEEP      LEEP PROCEDURE   • UPPER GASTROINTESTINAL ENDOSCOPY  12/14/18       Current Outpatient Medications:   •  albuterol (PROVENTIL HFA;VENTOLIN HFA) 108 (90 Base) MCG/ACT inhaler, Inhale 2 puffs., Disp: , Rfl:   •  amitriptyline (ELAVIL) 10 MG tablet, Take 1 tablet by mouth every night at bedtime., Disp: 90 tablet, Rfl: 3  •  Breo Ellipta 100-25 MCG/ACT aerosol powder , , Disp: , Rfl:   •  Cetirizine HCl (ZYRTEC ALLERGY PO), Take by mouth., Disp: , Rfl:   •  Cholecalciferol (VITAMIN D3 PO), Take  by mouth., Disp: , Rfl:   •  CVS B-12 1000 MCG tablet controlled-release, Take 1 tablet by mouth Daily., Disp: , Rfl: 11  •  drospirenone-ethinyl estradiol (ROB,GIANVI) 3-0.02 MG per tablet,  TAKE 1 TABLET BY MOUTH EVERY DAY, Disp: , Rfl:   •  fluticasone (FLONASE) 50 MCG/ACT nasal spray, , Disp: , Rfl:   •  linaclotide (Linzess) 72 MCG capsule capsule, Take 1 capsule by mouth Every Morning Before Breakfast., Disp: 90 capsule, Rfl: 3  •  montelukast (SINGULAIR) 10 MG tablet, Take 10 mg by mouth Daily., Disp: , Rfl: 5  •  Probiotic Product (PROBIOTIC PO), Take  by mouth., Disp: , Rfl:   PRN Meds:.  Allergies   Allergen Reactions   • Metronidazole GI Intolerance   • Neomycin Rash   • Nickel Rash   • Other Rash     DISPERSE DYES     Social History     Socioeconomic History   • Marital status: Single   Tobacco Use   • Smoking status: Never   • Smokeless tobacco: Never   Vaping Use   • Vaping Use: Never used   Substance and Sexual Activity   • Alcohol use: Yes     Alcohol/week: 4.0 standard drinks     Types: 2 Glasses of wine, 1 Cans of beer, 1 Drinks containing 0.5 oz of alcohol per week     Comment: WEEKENDS AND HOLIDAYS   • Drug use: No   • Sexual activity: Yes     Birth control/protection: Condom, Birth control pill     Family History   Problem Relation Age of Onset   • Gallbladder disease Mother    • Irritable bowel syndrome Mother    • Hyperlipidemia Mother    • Hypertension Mother    • Crohn's disease Father      Review of Systems   Constitutional: Negative for appetite change and unexpected weight change.   Gastrointestinal: Positive for constipation. Negative for abdominal pain.     Vitals:    12/21/22 1550   BP: 116/76   Pulse: 79   Temp: 97.5 °F (36.4 °C)   SpO2: 98%         12/21/22  1550   Weight: 54 kg (119 lb)       Objective   Physical Exam  Vitals and nursing note reviewed.   Constitutional:       Appearance: She is well-developed.   HENT:      Head: Normocephalic and atraumatic.   Eyes:      General: No scleral icterus.     Conjunctiva/sclera: Conjunctivae normal.   Cardiovascular:      Rate and Rhythm: Normal rate and regular rhythm.   Pulmonary:      Effort: Pulmonary effort is normal.    Abdominal:      General: Bowel sounds are normal. There is no distension.      Palpations: Abdomen is soft.   Musculoskeletal:      Cervical back: Normal range of motion and neck supple.   Skin:     General: Skin is warm and dry.   Neurological:      Mental Status: She is alert and oriented to person, place, and time.       No radiology results for the last 7 days    Assessment & Plan   Diagnoses and all orders for this visit:    Irritable bowel syndrome with constipation    Other orders  -     Breo Ellipta 100-25 MCG/ACT aerosol powder   -     linaclotide (Linzess) 72 MCG capsule capsule; Take 1 capsule by mouth Every Morning Before Breakfast.  -     amitriptyline (ELAVIL) 10 MG tablet; Take 1 tablet by mouth every night at bedtime.      Plan:  · Symptoms stable on Linzess 72 mcg daily.  She can take a second pill if needed as needed.  Gave samples today.  · Continue Elavil 10 mg p.o. nightly-no abdominal pain at this time  · Her diet has expanded and her weight has improved-I encouraged her to continue to try new foods.  She uses Lactaid with dairy and this seems to be very helpful.

## 2023-01-26 ENCOUNTER — PATIENT ROUNDING (BHMG ONLY) (OUTPATIENT)
Dept: INTERNAL MEDICINE | Age: 33
End: 2023-01-26
Payer: COMMERCIAL

## 2023-01-26 ENCOUNTER — OFFICE VISIT (OUTPATIENT)
Dept: INTERNAL MEDICINE | Age: 33
End: 2023-01-26
Payer: COMMERCIAL

## 2023-01-26 VITALS
SYSTOLIC BLOOD PRESSURE: 108 MMHG | TEMPERATURE: 98.6 F | BODY MASS INDEX: 18.87 KG/M2 | OXYGEN SATURATION: 98 % | HEART RATE: 98 BPM | DIASTOLIC BLOOD PRESSURE: 68 MMHG | RESPIRATION RATE: 14 BRPM | HEIGHT: 67 IN | WEIGHT: 120.2 LBS

## 2023-01-26 DIAGNOSIS — F32.A ANXIETY AND DEPRESSION: ICD-10-CM

## 2023-01-26 DIAGNOSIS — Z76.89 ENCOUNTER TO ESTABLISH CARE: Primary | ICD-10-CM

## 2023-01-26 DIAGNOSIS — K58.1 IRRITABLE BOWEL SYNDROME WITH CONSTIPATION: ICD-10-CM

## 2023-01-26 DIAGNOSIS — J45.40 MODERATE PERSISTENT ASTHMA WITHOUT COMPLICATION: ICD-10-CM

## 2023-01-26 DIAGNOSIS — F41.9 ANXIETY AND DEPRESSION: ICD-10-CM

## 2023-01-26 PROBLEM — J30.9 ALLERGIC RHINITIS: Status: ACTIVE | Noted: 2023-01-26

## 2023-01-26 PROCEDURE — 99214 OFFICE O/P EST MOD 30 MIN: CPT

## 2023-01-26 RX ORDER — MELATONIN
1000 DAILY
COMMUNITY

## 2023-01-26 NOTE — PROGRESS NOTES
A My-Chart message has been sent to the patient [place] [facility] [place]

## 2023-01-26 NOTE — PROGRESS NOTES
"    I N T E R N A L  M E D I C I N E  Shelby Rodriguez, APRN    ENCOUNTER DATE:  01/26/2023    Jose Wadsworth / 32 y.o. / female      CHIEF COMPLAINT / REASON FOR OFFICE VISIT     Establish Care (Would like to discuss her mental health)      ASSESSMENT & PLAN     Diagnoses and all orders for this visit:    1. Encounter to establish care (Primary)    2. Anxiety and depression  -     Ambulatory Referral to Behavioral Health  -     Ambulatory Referral to Psychiatry    3. Moderate persistent asthma without complication    4. Irritable bowel syndrome with constipation         SUMMARY/DISCUSSION  • Discussed possible medication management for anxiety/ depression, including hydroxyzine PRN, and she declines at this time.  Would like to move forward with counseling, as well as psychiatry referral for possible ADHD evaluation.    • Continue to be followed by OBGYN, pulmonology and GI.  • She would like to update her annual physical and labs in March 2023.      Next Appointment with me: Visit date not found    Return in about 1 month (around 2/26/2023) for 1 month for annual physical + Please sign medical release for prior PCP.      VITAL SIGNS     Visit Vitals  /68 (BP Location: Left arm, Patient Position: Sitting, Cuff Size: Adult)   Pulse 98   Temp 98.6 °F (37 °C) (Temporal)   Resp 14   Ht 170.2 cm (67\")   Wt 54.5 kg (120 lb 3.2 oz)   LMP 01/12/2023 (Approximate)   SpO2 98%   BMI 18.83 kg/m²             Wt Readings from Last 3 Encounters:   01/26/23 54.5 kg (120 lb 3.2 oz)   12/21/22 54 kg (119 lb)   07/21/22 52.2 kg (115 lb 1.6 oz)     Body mass index is 18.83 kg/m².        MEDICATIONS AT THE TIME OF OFFICE VISIT     Current Outpatient Medications on File Prior to Visit   Medication Sig Dispense Refill   • albuterol (PROVENTIL HFA;VENTOLIN HFA) 108 (90 Base) MCG/ACT inhaler Inhale 2 puffs.     • amitriptyline (ELAVIL) 10 MG tablet Take 1 tablet by mouth every night at bedtime. 90 tablet 3   • Breo Ellipta 100-25 " MCG/ACT aerosol powder       • Cetirizine HCl (ZYRTEC ALLERGY PO) Take by mouth.     • Cholecalciferol (VITAMIN D3 PO) Take  by mouth.     • cholecalciferol (VITAMIN D3) 25 MCG (1000 UT) tablet Take 1,000 Units by mouth Daily.     • CVS B-12 1000 MCG tablet controlled-release Take 1 tablet by mouth Daily.  11   • drospirenone-ethinyl estradiol (ROB,GIANVI) 3-0.02 MG per tablet TAKE 1 TABLET BY MOUTH EVERY DAY     • fluticasone (FLONASE) 50 MCG/ACT nasal spray      • linaclotide (Linzess) 72 MCG capsule capsule Take 1 capsule by mouth Every Morning Before Breakfast. 90 capsule 3   • montelukast (SINGULAIR) 10 MG tablet Take 10 mg by mouth Daily.  5   • Probiotic Product (PROBIOTIC PO) Take  by mouth.       No current facility-administered medications on file prior to visit.        HISTORY OF PRESENT ILLNESS     Here to establish care.  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  She is engaged to be .    Followed yearly by pulmonology, Dr. Ulrich for asthma: Asthma is well controlled with albuterol inhaler PRN with current infrequent use, Breo Ellipta inhaler daily, Singulair daily.    Allergic rhinitis: Zyrtec, Flonase.    Followed by OBGYNDr. Marcia at [place]: Prescribed oral contraceptives.  Up to date on pap.    Followed by GI, Dr. Viveros for IBS-constipation: Daily Linzess with benefit.  Also amitriptyline 10 mg nightly for insomnia.    She expresses concern for possible ADHD diagnosis due to an ongoing inability to take the next step surrounding big life decisions.  She reports difficulty focusing on subjects she is not interested in.  When she is hyperfocused on the task at hand, she can forget about body cues.  She reports she is easily distracted.  She is not interested in medication management for this.     Anxiety/ Depression/ OCD: Has been on treatment in the past but can't recall what she was on.  She reports ongoing,  significant social anxiety.  Some depression symptoms, including feel bad about herself.  Denies any SI/ HI.    PHQ-9 Depression Score of 8.  URBAN-7 Anxiety Score of 13.    Had anterior cervical surgery with Dr. Sparks at [facility] in March 2022 with jairo.      Was evaluated by cardiology, Dr. Cooper, in 2021 for known history of heart murmur, palpitations.  She had normal stress test, and ECHO.  She is no longer followed by cardiology.  Reports the rare occurrence of palpitations.  Denies any chest pain, dyspnea, lower extremity swelling, exercise intolerance.      Patient Care Team:  Shelby Rodriguez APRN as PCP - General (Family Medicine)  Christian Ulrich MD as Consulting Physician (Pulmonary Disease)  Marcia Nicholas MD as Consulting Physician (Obstetrics and Gynecology)  Cha Viveros MD as Consulting Physician (Gastroenterology)    REVIEW OF SYSTEMS     Review of Systems   Constitutional: Negative for chills, fever and unexpected weight change.   Respiratory: Negative for cough, chest tightness and shortness of breath.    Cardiovascular: Negative for chest pain, palpitations and leg swelling.   Gastrointestinal: Positive for constipation (Chronic). Negative for abdominal pain.   Neurological: Negative for dizziness, weakness, light-headedness and headaches.   Psychiatric/Behavioral: Positive for decreased concentration, dysphoric mood and sleep disturbance. Negative for self-injury and suicidal ideas. The patient is nervous/anxious.           PHYSICAL EXAMINATION     Physical Exam  Vitals reviewed.   Constitutional:       General: She is not in acute distress.     Appearance: Normal appearance. She is not ill-appearing, toxic-appearing or diaphoretic.   HENT:      Head: Normocephalic and atraumatic.   Cardiovascular:      Rate and Rhythm: Normal rate and regular rhythm.      Heart sounds: Murmur heard.   Pulmonary:      Effort: Pulmonary effort is normal.      Breath sounds: Normal breath sounds.    Neurological:      Mental Status: She is alert and oriented to person, place, and time. Mental status is at baseline.   Psychiatric:         Mood and Affect: Mood normal.         Behavior: Behavior normal.         Thought Content: Thought content normal.         Judgment: Judgment normal.           REVIEWED DATA     Labs:           Imaging:            Medical Tests:           Summary of old records / correspondence / consultant report:           Request outside records:

## 2023-02-17 RX ORDER — AMITRIPTYLINE HYDROCHLORIDE 10 MG/1
10 TABLET, FILM COATED ORAL
Qty: 90 TABLET | Refills: 3 | Status: SHIPPED | OUTPATIENT
Start: 2023-02-17

## 2023-03-06 ENCOUNTER — OFFICE VISIT (OUTPATIENT)
Dept: INTERNAL MEDICINE | Age: 33
End: 2023-03-06
Payer: COMMERCIAL

## 2023-03-06 VITALS
HEIGHT: 67 IN | TEMPERATURE: 98.2 F | BODY MASS INDEX: 18.52 KG/M2 | WEIGHT: 118 LBS | SYSTOLIC BLOOD PRESSURE: 110 MMHG | DIASTOLIC BLOOD PRESSURE: 70 MMHG | HEART RATE: 84 BPM | OXYGEN SATURATION: 98 %

## 2023-03-06 DIAGNOSIS — E55.9 VITAMIN D DEFICIENCY: ICD-10-CM

## 2023-03-06 DIAGNOSIS — Z00.00 ANNUAL PHYSICAL EXAM: Primary | ICD-10-CM

## 2023-03-06 DIAGNOSIS — Z23 ENCOUNTER FOR IMMUNIZATION: ICD-10-CM

## 2023-03-06 PROCEDURE — 99395 PREV VISIT EST AGE 18-39: CPT

## 2023-03-06 PROCEDURE — 90677 PCV20 VACCINE IM: CPT

## 2023-03-06 PROCEDURE — 90471 IMMUNIZATION ADMIN: CPT

## 2023-03-06 PROCEDURE — 90715 TDAP VACCINE 7 YRS/> IM: CPT

## 2023-03-06 PROCEDURE — 90472 IMMUNIZATION ADMIN EACH ADD: CPT

## 2023-03-06 NOTE — PROGRESS NOTES
"    I N T E R N A L  M E D I C I N E  BHAVANA Borja      ENCOUNTER DATE:  03/06/2023    Jose Wadsworth / 32 y.o. / female      CHIEF COMPLAINT     Annual Exam    Pt was previously referred for psychiatry evaluation of possible ADHD.  She has not yet scheduled.  She reports she was unable to schedule counseling appointment due to scheduling conflicts.      Followed yearly by pulmonology, Dr. Ulrich for asthma: Asthma is well controlled with albuterol inhaler PRN Breo Ellipta inhaler daily, Singulair daily.     Allergic rhinitis: Zyrtec, Flonase.     Followed by OBGYN, Dr. Marcia Nicholas at [facility]: Prescribed oral contraceptives.  Up to date on pap.     Followed by GI, Dr. Viveros for IBS-constipation: Daily Linzess with benefit.  Also amitriptyline 10 mg nightly for insomnia.     Pt was previously referred for psychiatry evaluation of possible ADHD.  She has not yet scheduled.           Patient Care Team:  Shelby Rodriguez APRN as PCP - General (Family Medicine)  Christian Ulrich MD as Consulting Physician (Pulmonary Disease)  Marcia Nicholas MD as Consulting Physician (Obstetrics and Gynecology)  Cha Viveros MD as Consulting Physician (Gastroenterology)  VITALS     Visit Vitals  /70   Pulse 84   Temp 98.2 °F (36.8 °C)   Ht 170.2 cm (67.01\")   Wt 53.5 kg (118 lb)   SpO2 98%   BMI 18.48 kg/m²       BP Readings from Last 3 Encounters:   03/06/23 110/70   01/26/23 108/68   12/21/22 116/76     Wt Readings from Last 3 Encounters:   03/06/23 53.5 kg (118 lb)   01/26/23 54.5 kg (120 lb 3.2 oz)   12/21/22 54 kg (119 lb)      Body mass index is 18.48 kg/m².       MEDICATIONS     Current Outpatient Medications on File Prior to Visit   Medication Sig Dispense Refill   • albuterol (PROVENTIL HFA;VENTOLIN HFA) 108 (90 Base) MCG/ACT inhaler Inhale 2 puffs.     • amitriptyline (ELAVIL) 10 MG tablet TAKE 1 TABLET BY MOUTH EVERY NIGHT AT BEDTIME 90 tablet 3   • Breo Ellipta 100-25 MCG/ACT aerosol powder     "   • Cetirizine HCl (ZYRTEC ALLERGY PO) Take by mouth.     • Cholecalciferol (VITAMIN D3 PO) Take  by mouth.     • cholecalciferol (VITAMIN D3) 25 MCG (1000 UT) tablet Take 1 tablet by mouth Daily.     • [facility] B-12 1000 MCG tablet controlled-release Take 1 tablet by mouth Daily.  11   • drospirenone-ethinyl estradiol (ROB,GIANVI) 3-0.02 MG per tablet TAKE 1 TABLET BY MOUTH EVERY DAY     • fluticasone (FLONASE) 50 MCG/ACT nasal spray      • linaclotide (Linzess) 72 MCG capsule capsule Take 1 capsule by mouth Every Morning Before Breakfast. 90 capsule 3   • montelukast (SINGULAIR) 10 MG tablet Take 1 tablet by mouth Daily.  5   • Probiotic Product (PROBIOTIC PO) Take  by mouth.       No current facility-administered medications on file prior to visit.         HISTORY OF PRESENT ILLNESS     Jose presents for annual health maintenance visit.    · General health: good  · Lifestyle:  · Attempting to lose weight?: No   · Diet: eats a well balanced, healthy diet  · Exercise: walks regularly and exercises 2 days/week  · Tobacco: Never used   · Alcohol: 4 days/week  · Work: Full-time  · Reproductive health:  · Sexually active?: Yes   · Sexual problems?: No problems  · Concern for STD?: No    · Sees Gynecologist?: Yes   · Rama/Postmenopausal?: No   · Domestic abuse concerns: No   · Depression Screening:      PHQ-2/PHQ-9 Depression Screening 1/26/2023   Little Interest or Pleasure in Doing Things 0-->not at all   Feeling Down, Depressed or Hopeless 0-->not at all   PHQ-9: Brief Depression Severity Measure Score 0         PHQ-2: 0 (Not depressed)   PHQ-9: 0 (Negative screening for depression)    Patient Care Team:  Shelby Rodriguez APRN as PCP - General (Family Medicine)  Christian Ulrich MD as Consulting Physician (Pulmonary Disease)  Marcia Nicholas MD as Consulting Physician (Obstetrics and Gynecology)  Cha Viveros MD as Consulting Physician (Gastroenterology)      ALLERGIES  Allergies   Allergen Reactions   •  Metronidazole GI Intolerance   • Neomycin Rash   • Nickel Rash   • Other Rash     DISPERSE DYES        PFSH:     The following portions of the patient's history were reviewed and updated as appropriate: Allergies / Current Medications / Past Medical History / Surgical History / Social History / Family History    PROBLEM LIST   Patient Active Problem List   Diagnosis   • Palpitations   • ECG abnormal   • Precordial pain   • Moderate persistent asthma without complication   • Irritable bowel syndrome with constipation   • Anxiety and depression   • Allergic rhinitis       PAST MEDICAL HISTORY  Past Medical History:   Diagnosis Date   • Allergic rhinitis    • Asthma    • Chronic constipation    • Heart murmur     Mild   • Irritable bowel syndrome 2013   • Upper respiratory infection    • Vitamin B 12 deficiency    • Vitamin D deficiency        SURGICAL HISTORY  Past Surgical History:   Procedure Laterality Date   • APPENDECTOMY N/A 02/05/2016    Dr. Duong Johnson   • CERVICAL SPINE ANTERIOR     • COLONOSCOPY  12/14/2018    Normal   • DENTAL PROCEDURE      x 2   • ENDOSCOPY  12/14/2018    Yeast in esophagus   • EYE SURGERY  2016    Lasik   • LASIK     • LEEP      LEEP PROCEDURE   • SPINE SURGERY  3/24/22    [facility] at [facility]   • UPPER GASTROINTESTINAL ENDOSCOPY  12/14/18       SOCIAL HISTORY  Social History     Socioeconomic History   • Marital status: Single   Tobacco Use   • Smoking status: Never   • Smokeless tobacco: Never   Vaping Use   • Vaping Use: Never used   Substance and Sexual Activity   • Alcohol use: Yes     Alcohol/week: 4.0 standard drinks     Types: 2 Glasses of wine, 1 Cans of beer, 1 Drinks containing 0.5 oz of alcohol per week     Comment: WEEKENDS AND HOLIDAYS   • Drug use: No   • Sexual activity: Yes     Partners: Male     Birth control/protection: Condom, Birth control pill       FAMILY HISTORY  Family History   Problem Relation Age of Onset   • Gallbladder disease Mother    • Irritable bowel  syndrome Mother    • Hyperlipidemia Mother    • Hypertension Mother    • Hypothyroidism Mother    • Hypothyroidism Father    • Hypertension Father    • Crohn's disease Father    • Heart attack Maternal Grandfather    • Stroke Paternal Grandmother    • Diabetes Maternal Uncle        IMMUNIZATION HISTORY  Immunization History   Administered Date(s) Administered   • COVID-19 (MODERNA) BOOSTER 11/20/2021   • COVID-19 (PFIZER) BIVALENT BOOSTER 12+YRS 09/10/2022   • COVID-19 (PFIZER) PURPLE CAP 02/28/2021, 03/29/2021   • FluLaval/Fluzone >6mos 10/23/2019, 09/25/2021   • Fluzone Quad >6mos (Multi-dose) 12/10/2018   • Hep A, Unspecified 05/04/2010   • Influenza, Unspecified 12/07/2018, 09/13/2020, 09/10/2022   • Pneumococcal Conjugate 20-Valent (PCV20) 03/06/2023   • Tdap 03/06/2023   • Typhoid Inactivated 05/04/2010         REVIEW OF SYSTEMS     Review of Systems   Constitutional: Negative for chills, fever and unexpected weight change.   Respiratory: Negative for cough, chest tightness and shortness of breath.    Cardiovascular: Negative for chest pain, palpitations and leg swelling.   Gastrointestinal: Positive for constipation (Chronic).   Neurological: Negative for dizziness, weakness, light-headedness and headaches.   Psychiatric/Behavioral: Negative for self-injury and suicidal ideas. The patient is nervous/anxious.          PHYSICAL EXAMINATION     Physical Exam  Vitals reviewed.   Constitutional:       General: She is not in acute distress.     Appearance: Normal appearance. She is not ill-appearing, toxic-appearing or diaphoretic.   HENT:      Head: Normocephalic and atraumatic.      Right Ear: Tympanic membrane, ear canal and external ear normal. There is no impacted cerumen.      Left Ear: Tympanic membrane, ear canal and external ear normal. There is no impacted cerumen.      Nose: Nose normal. No congestion or rhinorrhea.      Mouth/Throat:      Mouth: Mucous membranes are moist.      Pharynx: Oropharynx is  clear. No oropharyngeal exudate or posterior oropharyngeal erythema.   Eyes:      Extraocular Movements: Extraocular movements intact.      Conjunctiva/sclera: Conjunctivae normal.      Pupils: Pupils are equal, round, and reactive to light.   Cardiovascular:      Rate and Rhythm: Normal rate and regular rhythm.      Heart sounds: Normal heart sounds.   Pulmonary:      Effort: Pulmonary effort is normal. No respiratory distress.      Breath sounds: Normal breath sounds.   Abdominal:      General: Bowel sounds are normal.      Palpations: Abdomen is soft.      Tenderness: There is no abdominal tenderness.   Musculoskeletal:         General: Normal range of motion.      Cervical back: Normal range of motion and neck supple.      Right lower leg: No edema.      Left lower leg: No edema.   Lymphadenopathy:      Cervical: No cervical adenopathy.   Skin:     General: Skin is warm and dry.   Neurological:      General: No focal deficit present.      Mental Status: She is alert and oriented to person, place, and time. Mental status is at baseline.   Psychiatric:         Mood and Affect: Mood normal.         Behavior: Behavior normal.         Thought Content: Thought content normal.         Judgment: Judgment normal.         REVIEWED DATA      Labs:    Lab Results   Component Value Date     07/23/2018    K 3.9 07/23/2018    CALCIUM 9.6 07/23/2018    AST 16 07/23/2018    ALT 15 07/23/2018    BUN 7 07/23/2018    CREATININE 0.76 07/23/2018    EGFRIFNONA 91 07/23/2018    EGFRIFAFRI 111 07/23/2018       Lab Results   Component Value Date    GLUCOSE 87 07/23/2018    TSH 2.960 08/09/2018       No results found for: LDL, HDL, TRIG, CHOLHDLRATIO    No results found for: VRFL87BL     Lab Results   Component Value Date    WBC 5.56 03/24/2022    HGB 13.6 03/24/2022    MCV 98.1 03/24/2022     03/24/2022       No results found for: PROTEIN, GLUCOSEU, BLOODU, NITRITEU, LEUKOCYTESUR       Lab Results   Component Value Date     HEPCVIRUSABY Nonreactive 02/17/2021       Imaging:        Medical Tests:        ASSESSMENT & PLAN     ANNUAL WELLNESS EXAM / PHYSICAL     Diagnoses and all orders for this visit:    1. Annual physical exam (Primary)  -     CBC & Differential  -     Comprehensive Metabolic Panel  -     Hemoglobin A1c  -     Lipid Panel With / Chol / HDL Ratio  -     TSH+Free T4  -     Urinalysis With Microscopic If Indicated (No Culture) - Urine, Clean Catch    2. Vitamin D deficiency  -     Vitamin D,25-Hydroxy    3. Encounter for immunization  -     Tdap Vaccine Greater Than or Equal To 6yo IM  -     Pneumococcal Conjugate Vaccine 20-Valent (PCV20)         Summary/Discussion:     • Provided with phone number to schedule with psychiatry.  She was encouraged to reach out to her insurance to investigate possible counseling services through [facility] program.      Next Appointment with me: Visit date not found    Return in about 1 year (around 3/6/2024) for Annual physical.      HEALTHCARE MAINTENANCE ISSUES     Cancer Screening:  · Colon: Initial/Next screening at age: 45  · Repeat colon cancer screening: N/A at this time  · Breast: Recommended monthly self exams; annual professional exam  · Mammogram: N/A at this time  · Cervical: 3 years  · Skin: Monthly self skin examination, annual exam by health professional  · Lung: Does not meet criteria for lung cancer screening.   · Other:    Screening Labs & Tests:  · Lab results reviewed & discussed with the patient or test orders placed today.  · EKG:  · CV Screening: Lipid panel  · DEXA (65+ or postmenopausal with risk factors):   · HEP C (If born 0[birth year], or risk factors): Previously had negative screen  · Other:     Immunization/Vaccinations (to be given today unless deferred by patient)  · Influenza: Patient had the flu shot this season  · Hepatitis A: Verify immunization records  · Hepatitis B: Verify immunization records  · Tetanus/Pertussis: Administer today  · Pneumovax: Administer  today  · Shingles: Not needed at this time  · COVID: Had the bivalent vaccine    Lifestyle Counseling:  · Lifestyle Modifications: Continue good lifestyle choices/modifications and Reduce exposure to stress if possible  · Safety Issues: Always wear seatbelt, Avoid texting while driving   · Use sunscreen, regular skin examination  · Recommended annual dental/vision examination.  · Emotional/Stress/Sleep: Reviewed and  given when appropriate      Health Maintenance   Topic Date Due   • PAP SMEAR  05/29/2023   • ANNUAL PHYSICAL  03/06/2024   • TDAP/TD VACCINES (2 - Td or Tdap) 03/06/2033   • HEPATITIS C SCREENING  Completed   • COVID-19 Vaccine  Completed   • Pneumococcal Vaccine 0-64  Completed   • INFLUENZA VACCINE  Completed

## 2023-03-07 DIAGNOSIS — E03.8 SUBCLINICAL HYPOTHYROIDISM: Primary | ICD-10-CM

## 2023-03-07 LAB
25(OH)D3+25(OH)D2 SERPL-MCNC: 75.3 NG/ML (ref 30–100)
ALBUMIN SERPL-MCNC: 4.5 G/DL (ref 3.5–5.2)
ALBUMIN/GLOB SERPL: 2.3 G/DL
ALP SERPL-CCNC: 73 U/L (ref 39–117)
ALT SERPL-CCNC: 10 U/L (ref 1–33)
APPEARANCE UR: CLEAR
AST SERPL-CCNC: 18 U/L (ref 1–32)
BASOPHILS # BLD AUTO: 0.06 10*3/MM3 (ref 0–0.2)
BASOPHILS NFR BLD AUTO: 1 % (ref 0–1.5)
BILIRUB SERPL-MCNC: 0.4 MG/DL (ref 0–1.2)
BILIRUB UR QL STRIP: NEGATIVE
BUN SERPL-MCNC: 9 MG/DL (ref 6–20)
BUN/CREAT SERPL: 11.5 (ref 7–25)
CALCIUM SERPL-MCNC: 9.4 MG/DL (ref 8.6–10.5)
CHLORIDE SERPL-SCNC: 103 MMOL/L (ref 98–107)
CHOLEST SERPL-MCNC: 170 MG/DL (ref 0–200)
CHOLEST/HDLC SERPL: 1.57 {RATIO}
CO2 SERPL-SCNC: 25.1 MMOL/L (ref 22–29)
COLOR UR: YELLOW
CREAT SERPL-MCNC: 0.78 MG/DL (ref 0.57–1)
EGFRCR SERPLBLD CKD-EPI 2021: 103.6 ML/MIN/1.73
EOSINOPHIL # BLD AUTO: 0.06 10*3/MM3 (ref 0–0.4)
EOSINOPHIL NFR BLD AUTO: 1 % (ref 0.3–6.2)
ERYTHROCYTE [DISTWIDTH] IN BLOOD BY AUTOMATED COUNT: 12.2 % (ref 12.3–15.4)
GLOBULIN SER CALC-MCNC: 2 GM/DL
GLUCOSE SERPL-MCNC: 89 MG/DL (ref 65–99)
GLUCOSE UR QL STRIP: NEGATIVE
HBA1C MFR BLD: 5.2 % (ref 4.8–5.6)
HCT VFR BLD AUTO: 40.9 % (ref 34–46.6)
HDLC SERPL-MCNC: 108 MG/DL (ref 40–60)
HGB BLD-MCNC: 13.8 G/DL (ref 12–15.9)
HGB UR QL STRIP: NEGATIVE
IMM GRANULOCYTES # BLD AUTO: 0.01 10*3/MM3 (ref 0–0.05)
IMM GRANULOCYTES NFR BLD AUTO: 0.2 % (ref 0–0.5)
KETONES UR QL STRIP: NEGATIVE
LDLC SERPL CALC-MCNC: 47 MG/DL (ref 0–100)
LEUKOCYTE ESTERASE UR QL STRIP: NEGATIVE
LYMPHOCYTES # BLD AUTO: 1.92 10*3/MM3 (ref 0.7–3.1)
LYMPHOCYTES NFR BLD AUTO: 31.5 % (ref 19.6–45.3)
MCH RBC QN AUTO: 32.6 PG (ref 26.6–33)
MCHC RBC AUTO-ENTMCNC: 33.7 G/DL (ref 31.5–35.7)
MCV RBC AUTO: 96.7 FL (ref 79–97)
MONOCYTES # BLD AUTO: 0.43 10*3/MM3 (ref 0.1–0.9)
MONOCYTES NFR BLD AUTO: 7 % (ref 5–12)
NEUTROPHILS # BLD AUTO: 3.62 10*3/MM3 (ref 1.7–7)
NEUTROPHILS NFR BLD AUTO: 59.3 % (ref 42.7–76)
NITRITE UR QL STRIP: NEGATIVE
NRBC BLD AUTO-RTO: 0 /100 WBC (ref 0–0.2)
PH UR STRIP: 6.5 [PH] (ref 5–8)
PLATELET # BLD AUTO: 273 10*3/MM3 (ref 140–450)
POTASSIUM SERPL-SCNC: 4.3 MMOL/L (ref 3.5–5.2)
PROT SERPL-MCNC: 6.5 G/DL (ref 6–8.5)
PROT UR QL STRIP: NEGATIVE
RBC # BLD AUTO: 4.23 10*6/MM3 (ref 3.77–5.28)
SODIUM SERPL-SCNC: 140 MMOL/L (ref 136–145)
SP GR UR STRIP: 1.01 (ref 1–1.03)
T4 FREE SERPL-MCNC: 1.09 NG/DL (ref 0.93–1.7)
TRIGL SERPL-MCNC: 83 MG/DL (ref 0–150)
TSH SERPL DL<=0.005 MIU/L-ACNC: 4.86 UIU/ML (ref 0.27–4.2)
UROBILINOGEN UR STRIP-MCNC: NORMAL MG/DL
VLDLC SERPL CALC-MCNC: 15 MG/DL (ref 5–40)
WBC # BLD AUTO: 6.1 10*3/MM3 (ref 3.4–10.8)

## 2023-04-07 ENCOUNTER — TELEPHONE (OUTPATIENT)
Dept: INTERNAL MEDICINE | Age: 33
End: 2023-04-07

## 2023-04-07 NOTE — TELEPHONE ENCOUNTER
Please call pt.    She can continue all medications as prescribed, and keep lab appointment as scheduled.  It will not effect lab draw.

## 2023-04-07 NOTE — TELEPHONE ENCOUNTER
Caller: Jose Wadsworth    Relationship: Self    Best call back number:[phone]     What was the call regarding: PATIENT STATES SHE STARTED TAKING PREDNISONE THIS WEEK AND WILL CONTINUE THROUGH NEXT WEEK. PATIENT IS ALSO IS ON TRELEGY INSTEAD OF THE BREO. PATIENT IS SUPPOSED TO COME IN ON 4/10/23 FOR LABS AND SHE WOULD LIKE TO KNOW IF THESE MEDICATIONS WILL EFFECT THE LABS     PLEASE CALL AND ADVISE     Do you require a callback: YES

## 2023-04-11 DIAGNOSIS — E03.8 SUBCLINICAL HYPOTHYROIDISM: Primary | ICD-10-CM

## 2023-04-19 DIAGNOSIS — E03.8 SUBCLINICAL HYPOTHYROIDISM: Primary | ICD-10-CM

## 2023-10-03 ENCOUNTER — OFFICE VISIT (OUTPATIENT)
Dept: GASTROENTEROLOGY | Facility: CLINIC | Age: 33
End: 2023-10-03
Payer: COMMERCIAL

## 2023-10-03 VITALS
HEIGHT: 67 IN | TEMPERATURE: 96.4 F | WEIGHT: 115.3 LBS | SYSTOLIC BLOOD PRESSURE: 107 MMHG | OXYGEN SATURATION: 98 % | BODY MASS INDEX: 18.1 KG/M2 | DIASTOLIC BLOOD PRESSURE: 71 MMHG | HEART RATE: 93 BPM

## 2023-10-03 DIAGNOSIS — K58.1 IRRITABLE BOWEL SYNDROME WITH CONSTIPATION: Primary | ICD-10-CM

## 2023-10-03 DIAGNOSIS — R14.0 ABDOMINAL BLOATING: ICD-10-CM

## 2023-10-03 RX ORDER — AMITRIPTYLINE HYDROCHLORIDE 10 MG/1
10 TABLET, FILM COATED ORAL
Qty: 90 TABLET | Refills: 3 | Status: SHIPPED | OUTPATIENT
Start: 2023-10-03

## 2023-10-03 NOTE — Clinical Note
Doing well with her IBS-C taking Linzess 72 mcg occasionally. Suggested she get more fiber plenty of water to prevent her constipation from returning. She also asked if she could stop the elavil. I suggested she stop it slowly if she is going to stop it to see if any of her symptoms return. Since she is feeling better, stopping all of her medications at once might throw her system off again and make he miserable. She said that made sense and she is going to try skipping doses of elevil occasionally and see if her symptoms return.

## 2023-10-03 NOTE — PROGRESS NOTES
"Chief Complaint  Irritable Bowel Syndrome    Subjective          History of Present Illness    Jose Wadsworth is a  32 y.o. female patient of Dr. Viveros's she was last seen in the office by Dr. Viveros on 12/21/2022 following up on her IBS-C which was stable.  She was taking Linzess 72 once daily.  On some occasions with take 2 pills when she felt she needed to empty better.  Is new to me.  The patient underwent a colonoscopy on December 14, 2018 the impression noted that the entire examined colon was normal and no specimens were collected.    Presents for follow up for her IBS-C reports constipation is improved. Now only taking the Linzess when her symptoms aree \"bad\". Occasional abdominal which is relieved with linzess 72 mcg when she uses it. In addition, she describes some gas and bloating for which she uses gas x which does provide some relief. She tried the IBGuard but because it uses gelatin in the ingredients she tries to avoid it since she is vegetarian.   Still taking amitriptyline and is wondering if she can stop that.         Objective   Vital Signs:   /71   Pulse 93   Temp 96.4 °F (35.8 °C)   Ht 170.2 cm (67\")   Wt 52.3 kg (115 lb 4.8 oz)   SpO2 98%   BMI 18.06 kg/m²       Physical Exam  Constitutional:       Appearance: Normal appearance.   Pulmonary:      Effort: Pulmonary effort is normal. No respiratory distress.   Abdominal:      General: Abdomen is flat. Bowel sounds are normal. There is no distension.      Palpations: Abdomen is soft. There is no mass.      Tenderness: There is no abdominal tenderness. There is no guarding or rebound.      Hernia: No hernia is present.   Skin:     General: Skin is warm and dry.   Neurological:      Mental Status: She is alert.   Psychiatric:         Mood and Affect: Mood normal.        Result Review :   The following data was reviewed by: BHAVANA Musa on 10/03/2023:  CMP          3/6/2023    09:19   CMP   Glucose 89    BUN 9    Creatinine " 0.78    Sodium 140    Potassium 4.3    Chloride 103    Calcium 9.4    Total Protein 6.5    Albumin 4.5    Globulin 2.0    Total Bilirubin 0.4    Alkaline Phosphatase 73    AST (SGOT) 18    ALT (SGPT) 10    BUN/Creatinine Ratio 11.5      TSH          3/6/2023    09:19 4/10/2023    09:47 8/10/2023    08:01   TSH   TSH 4.860  5.110  4.560            Assessment and Plan    Diagnoses and all orders for this visit:    1. Irritable bowel syndrome with constipation (Primary)    2. Abdominal bloating    Other orders  -     amitriptyline (ELAVIL) 10 MG tablet; Take 1 tablet by mouth every night at bedtime.  Dispense: 90 tablet; Refill: 3  -     linaclotide (Linzess) 72 MCG capsule capsule; Take 1 capsule by mouth Every Morning Before Breakfast.  Dispense: 90 capsule; Refill: 3    The patient will continue to use the Linzess 72 mcg more on an as needed basis and increase her fiber. She can continue the gas x for her gas and bloating. I gave her a fiber sheet and suggested she get between 20-25 grams of fiber daily.   With regard to the amitriptyline she can try skipping days on occasion and see if her symptoms worsen. I did let her know since things seem to be good stopping meds could throw her back into a constipated state and make her miserable again. She indicated understanding and said she will try skipping a day here and there and see if she notices any worsening of her symptoms.       Follow Up   Return in about 1 year (around 10/3/2024), or if symptoms worsen.    Dragon dictation used throughout this note.     Mari Johns, APRN

## 2023-10-11 ENCOUNTER — OFFICE VISIT (OUTPATIENT)
Dept: INTERNAL MEDICINE | Age: 33
End: 2023-10-11
Payer: COMMERCIAL

## 2023-10-11 ENCOUNTER — HOSPITAL ENCOUNTER (OUTPATIENT)
Facility: HOSPITAL | Age: 33
Discharge: HOME | End: 2023-10-11
Payer: COMMERCIAL

## 2023-10-11 VITALS
HEIGHT: 67 IN | TEMPERATURE: 97.7 F | OXYGEN SATURATION: 97 % | WEIGHT: 119 LBS | HEART RATE: 108 BPM | BODY MASS INDEX: 18.68 KG/M2 | DIASTOLIC BLOOD PRESSURE: 80 MMHG | SYSTOLIC BLOOD PRESSURE: 122 MMHG

## 2023-10-11 DIAGNOSIS — R50.9 FEVER, UNSPECIFIED FEVER CAUSE: ICD-10-CM

## 2023-10-11 DIAGNOSIS — R05.1 ACUTE COUGH: Primary | ICD-10-CM

## 2023-10-11 DIAGNOSIS — J18.9 PNEUMONIA OF RIGHT LOWER LOBE DUE TO INFECTIOUS ORGANISM: Primary | ICD-10-CM

## 2023-10-11 LAB
ALBUMIN SERPL-MCNC: 4.1 G/DL (ref 3.5–5.2)
ALBUMIN/GLOB SERPL: 1.8 G/DL
ALP SERPL-CCNC: 101 U/L (ref 39–117)
ALT SERPL-CCNC: 9 U/L (ref 1–33)
AST SERPL-CCNC: 10 U/L (ref 1–32)
BASOPHILS # BLD AUTO: 0.07 10*3/MM3 (ref 0–0.2)
BASOPHILS NFR BLD AUTO: 0.7 % (ref 0–1.5)
BILIRUB SERPL-MCNC: 0.2 MG/DL (ref 0–1.2)
BUN SERPL-MCNC: 7 MG/DL (ref 6–20)
BUN/CREAT SERPL: 10.8 (ref 7–25)
CALCIUM SERPL-MCNC: 10 MG/DL (ref 8.6–10.5)
CHLORIDE SERPL-SCNC: 107 MMOL/L (ref 98–107)
CO2 SERPL-SCNC: 24.2 MMOL/L (ref 22–29)
CREAT SERPL-MCNC: 0.65 MG/DL (ref 0.57–1)
EGFRCR SERPLBLD CKD-EPI 2021: 119.4 ML/MIN/1.73
EOSINOPHIL # BLD AUTO: 0.26 10*3/MM3 (ref 0–0.4)
EOSINOPHIL NFR BLD AUTO: 2.6 % (ref 0.3–6.2)
ERYTHROCYTE [DISTWIDTH] IN BLOOD BY AUTOMATED COUNT: 12.2 % (ref 12.3–15.4)
GLOBULIN SER CALC-MCNC: 2.3 GM/DL
GLUCOSE SERPL-MCNC: 99 MG/DL (ref 65–99)
HCT VFR BLD AUTO: 38 % (ref 34–46.6)
HGB BLD-MCNC: 13 G/DL (ref 12–15.9)
IMM GRANULOCYTES # BLD AUTO: 0.07 10*3/MM3 (ref 0–0.05)
IMM GRANULOCYTES NFR BLD AUTO: 0.7 % (ref 0–0.5)
LYMPHOCYTES # BLD AUTO: 2.08 10*3/MM3 (ref 0.7–3.1)
LYMPHOCYTES NFR BLD AUTO: 20.5 % (ref 19.6–45.3)
MCH RBC QN AUTO: 33.1 PG (ref 26.6–33)
MCHC RBC AUTO-ENTMCNC: 34.2 G/DL (ref 31.5–35.7)
MCV RBC AUTO: 96.7 FL (ref 79–97)
MONOCYTES # BLD AUTO: 0.76 10*3/MM3 (ref 0.1–0.9)
MONOCYTES NFR BLD AUTO: 7.5 % (ref 5–12)
NEUTROPHILS # BLD AUTO: 6.93 10*3/MM3 (ref 1.7–7)
NEUTROPHILS NFR BLD AUTO: 68 % (ref 42.7–76)
NRBC BLD AUTO-RTO: 0 /100 WBC (ref 0–0.2)
PLATELET # BLD AUTO: 395 10*3/MM3 (ref 140–450)
POTASSIUM SERPL-SCNC: 4.3 MMOL/L (ref 3.5–5.2)
PROT SERPL-MCNC: 6.4 G/DL (ref 6–8.5)
RBC # BLD AUTO: 3.93 10*6/MM3 (ref 3.77–5.28)
SODIUM SERPL-SCNC: 142 MMOL/L (ref 136–145)
WBC # BLD AUTO: 10.17 10*3/MM3 (ref 3.4–10.8)

## 2023-10-11 PROCEDURE — 99214 OFFICE O/P EST MOD 30 MIN: CPT

## 2023-10-11 PROCEDURE — 71046 X-RAY EXAM CHEST 2 VIEWS: CPT

## 2023-10-11 RX ORDER — AMOXICILLIN AND CLAVULANATE POTASSIUM 875; 125 MG/1; MG/1
1 TABLET, FILM COATED ORAL 2 TIMES DAILY
Qty: 14 TABLET | Refills: 0 | Status: SHIPPED | OUTPATIENT
Start: 2023-10-11 | End: 2023-10-18

## 2023-10-11 RX ORDER — AZITHROMYCIN 250 MG/1
TABLET, FILM COATED ORAL
Qty: 6 TABLET | Refills: 0 | Status: SHIPPED | OUTPATIENT
Start: 2023-10-11

## 2023-10-11 NOTE — PROGRESS NOTES
"    I N T E R N A L  M E D I C I N E  Shelby Rodriguez, APRIAIN    ENCOUNTER DATE:  10/11/2023    Jose Wadsworth / 33 y.o. / female      CHIEF COMPLAINT / REASON FOR OFFICE VISIT     Cough, Fever, and Fatigue (Covid and flu neg at [facility] on Friday 10-6-23/Sx started on 10/2/23)      ASSESSMENT & PLAN     Diagnoses and all orders for this visit:    1. Acute cough (Primary)  -     XR Chest 2 View  -     CBC & Differential  -     Comprehensive Metabolic Panel    2. Fever, unspecified fever cause  -     XR Chest 2 View  -     CBC & Differential  -     Comprehensive Metabolic Panel         SUMMARY/DISCUSSION  HR upon recheck at end of appointment was 90s.  Will rule out PNA with Chest XR.  If negative, will treat for bacterial bronchitis with antibiotic given report of fever, chills, continued productive cough, some shortness of breath/ chest tightness.  Recommend Mucinex DM, good hydration with water, rest.  Continue asthma medications as prescribed.  Visit ER for any acutely worsening symptoms.  She acknowledged understanding.        Next Appointment with me: 3/8/2024    Return in about 1 week (around 10/18/2023) for Recheck.      VITAL SIGNS     Visit Vitals  /80   Pulse 108   Temp 97.7 øF (36.5 øC)   Ht 170.2 cm (67.01\")   Wt 54 kg (119 lb)   LMP 08/22/2023   SpO2 97%   BMI 18.63 kg/mý             Wt Readings from Last 3 Encounters:   10/11/23 54 kg (119 lb)   10/03/23 52.3 kg (115 lb 4.8 oz)   03/06/23 53.5 kg (118 lb)     Body mass index is 18.63 kg/mý.        MEDICATIONS AT THE TIME OF OFFICE VISIT     Current Outpatient Medications on File Prior to Visit   Medication Sig Dispense Refill    albuterol (PROVENTIL HFA;VENTOLIN HFA) 108 (90 Base) MCG/ACT inhaler Inhale 2 puffs.      amitriptyline (ELAVIL) 10 MG tablet Take 1 tablet by mouth every night at bedtime. 90 tablet 3    Breo Ellipta 100-25 MCG/ACT aerosol powder        Cetirizine HCl (ZYRTEC ALLERGY PO) Take by mouth.      cholecalciferol (VITAMIN " D3) 25 MCG (1000 UT) tablet Take 1 tablet by mouth Daily.      [employer]S B-12 1000 MCG tablet controlled-release Take 1 tablet by mouth Daily.  11    drospirenone-ethinyl estradiol (ROB,GIANVI) 3-0.02 MG per tablet TAKE 1 TABLET BY MOUTH EVERY DAY      fluticasone (FLONASE) 50 MCG/ACT nasal spray       linaclotide (Linzess) 72 MCG capsule capsule Take 1 capsule by mouth Every Morning Before Breakfast. 90 capsule 3    montelukast (SINGULAIR) 10 MG tablet Take 1 tablet by mouth Daily.  5    Probiotic Product (PROBIOTIC PO) Take  by mouth.       No current facility-administered medications on file prior to visit.        HISTORY OF PRESENT ILLNESS     Symptoms started last Tuesday with fatigue, productive cough with green sputum.  She developed a fever on Thursday, October 5 of 100.4.  She visited the [facility] on Friday, October 6, and reportedly tested negative for COVID-19 and influenza at that time.  She has been alternating use of acetaminophen/ ibuprofen with benefit.  Some chest tightness, shortness of breath symptoms but no wheezing.  She is eating, drinking, urinating well.  No ill contacts.  Medical history is significant for asthma, and she remains complaint with prescribed albuterol inhaler, daily Breo.      Patient Care Team:  Shelby Rodriguez APRN as PCP - General (Family Medicine)  Christian Ulrich MD as Consulting Physician (Pulmonary Disease)  Marcia Nicholas MD as Consulting Physician (Obstetrics and Gynecology)  Cha Viveros MD as Consulting Physician (Gastroenterology)    REVIEW OF SYSTEMS     Review of Systems   Constitutional:  Positive for chills, fatigue and fever. Negative for unexpected weight change.   Respiratory:  Positive for cough and shortness of breath. Negative for chest tightness and wheezing.    Cardiovascular:  Negative for chest pain, palpitations and leg swelling.   Neurological:  Negative for dizziness, weakness, light-headedness and headaches.   Psychiatric/Behavioral:   The patient is not nervous/anxious.           PHYSICAL EXAMINATION     Physical Exam  Vitals reviewed.   Constitutional:       General: She is not in acute distress.     Appearance: Normal appearance. She is not ill-appearing, toxic-appearing or diaphoretic.   HENT:      Head: Normocephalic and atraumatic.      Right Ear: Tympanic membrane, ear canal and external ear normal. There is no impacted cerumen.      Left Ear: Tympanic membrane, ear canal and external ear normal. There is no impacted cerumen.      Nose: Nose normal. No congestion or rhinorrhea.      Mouth/Throat:      Mouth: Mucous membranes are moist.      Pharynx: Oropharynx is clear. No oropharyngeal exudate or posterior oropharyngeal erythema.   Cardiovascular:      Rate and Rhythm: Normal rate and regular rhythm.      Heart sounds: Normal heart sounds.   Pulmonary:      Effort: Pulmonary effort is normal.      Breath sounds: Normal breath sounds. No wheezing or rhonchi.   Lymphadenopathy:      Cervical: No cervical adenopathy.   Neurological:      Mental Status: She is alert and oriented to person, place, and time. Mental status is at baseline.   Psychiatric:         Mood and Affect: Mood normal.         Behavior: Behavior normal.         Thought Content: Thought content normal.         Judgment: Judgment normal.           REVIEWED DATA     Labs:           Imaging:            Medical Tests:           Summary of old records / correspondence / consultant report:           Request outside records:

## 2023-10-18 ENCOUNTER — OFFICE VISIT (OUTPATIENT)
Dept: INTERNAL MEDICINE | Age: 33
End: 2023-10-18
Payer: COMMERCIAL

## 2023-10-18 VITALS
SYSTOLIC BLOOD PRESSURE: 120 MMHG | DIASTOLIC BLOOD PRESSURE: 80 MMHG | TEMPERATURE: 97.6 F | HEART RATE: 87 BPM | OXYGEN SATURATION: 97 % | WEIGHT: 118 LBS | BODY MASS INDEX: 18.52 KG/M2 | HEIGHT: 67 IN

## 2023-10-18 DIAGNOSIS — Z87.01 HISTORY OF PNEUMONIA: Primary | ICD-10-CM

## 2023-10-18 NOTE — PROGRESS NOTES
"    I N T E R N A L  M E D I C I N E  Shelby Rodriguez, APRN    ENCOUNTER DATE:  10/18/2023    Jose Wadsworth / 33 y.o. / female      CHIEF COMPLAINT / REASON FOR OFFICE VISIT     Cough (Follow up )      ASSESSMENT & PLAN     Diagnoses and all orders for this visit:    1. History of pneumonia (Primary)  -     XR Chest 2 View; Future         SUMMARY/DISCUSSION  Her pneumonia has clinically improved as of today's improvement.  She will continue with Mucinex DM PRN, daily Zyrtec, asthma medications as prescribed.  Continue rest, good hydration with water.  Advance activity as tolerated.  Will repeat Chest XR in 1 month.    She was encouraged to obtain COVID-19 and influenza vaccines next week.        Next Appointment with me: 3/8/2024    Return for Next scheduled follow up.      VITAL SIGNS     Visit Vitals  /80   Pulse 87   Temp 97.6 °F (36.4 °C)   Ht 170.2 cm (67.01\")   Wt 53.5 kg (118 lb)   LMP 08/22/2023   SpO2 97%   BMI 18.48 kg/m²             Wt Readings from Last 3 Encounters:   10/18/23 53.5 kg (118 lb)   10/11/23 54 kg (119 lb)   10/03/23 52.3 kg (115 lb 4.8 oz)     Body mass index is 18.48 kg/m².        MEDICATIONS AT THE TIME OF OFFICE VISIT     Current Outpatient Medications on File Prior to Visit   Medication Sig Dispense Refill    albuterol (PROVENTIL HFA;VENTOLIN HFA) 108 (90 Base) MCG/ACT inhaler Inhale 2 puffs.      amitriptyline (ELAVIL) 10 MG tablet Take 1 tablet by mouth every night at bedtime. 90 tablet 3    amoxicillin-clavulanate (AUGMENTIN) 875-125 MG per tablet Take 1 tablet by mouth 2 (Two) Times a Day for 7 days. 14 tablet 0    Breo Ellipta 100-25 MCG/ACT aerosol powder        Cetirizine HCl (ZYRTEC ALLERGY PO) Take by mouth.      cholecalciferol (VITAMIN D3) 25 MCG (1000 UT) tablet Take 1 tablet by mouth Daily.      [facility] B-12 1000 MCG tablet controlled-release Take 1 tablet by mouth Daily.  11    drospirenone-ethinyl estradiol (ROB,GIANVI) 3-0.02 MG per tablet TAKE 1 TABLET BY MOUTH " EVERY DAY      fluticasone (FLONASE) 50 MCG/ACT nasal spray       linaclotide (Linzess) 72 MCG capsule capsule Take 1 capsule by mouth Every Morning Before Breakfast. 90 capsule 3    montelukast (SINGULAIR) 10 MG tablet Take 1 tablet by mouth Daily.  5    Probiotic Product (PROBIOTIC PO) Take  by mouth.      [DISCONTINUED] azithromycin (Zithromax Z-Ba) 250 MG tablet Take 2 tablets by mouth on day 1, then 1 tablet daily on days 2-5 6 tablet 0     No current facility-administered medications on file prior to visit.        HISTORY OF PRESENT ILLNESS     Here as 1 week follow up after being treated for pneumonia with Augmentin and Z Pack.  October 11, 2023 Chest XR showed moderately sever right lower lobe pneumonia; minimal left lower lobe pneumonia.  CBC was without white count elevation.  She reports resolution of fever, chills.  Denies chest pain, cough, shortness of breath.  Does continue with some post nasal drip, clear sinus drainage.  She remains on prescribed asthma medications.  She is eating, drinking, urinating well.      Patient Care Team:  Shelby Rodriguez APRN as PCP - General (Family Medicine)  Christian Ulrich MD as Consulting Physician (Pulmonary Disease)  Marcia Nicholas MD as Consulting Physician (Obstetrics and Gynecology)  Cha Viveros MD as Consulting Physician (Gastroenterology)    REVIEW OF SYSTEMS     Review of Systems   Constitutional:  Negative for chills, fever and unexpected weight change.   Respiratory:  Negative for cough, chest tightness, shortness of breath and wheezing.    Cardiovascular:  Negative for chest pain, palpitations and leg swelling.   Neurological:  Negative for dizziness, weakness, light-headedness and headaches.   Psychiatric/Behavioral:  The patient is not nervous/anxious.           PHYSICAL EXAMINATION     Physical Exam  Vitals reviewed.   Constitutional:       General: She is not in acute distress.     Appearance: Normal appearance. She is not  ill-appearing, toxic-appearing or diaphoretic.   HENT:      Head: Normocephalic and atraumatic.      Right Ear: Tympanic membrane, ear canal and external ear normal. There is no impacted cerumen.      Left Ear: Tympanic membrane, ear canal and external ear normal. There is no impacted cerumen.      Nose: Nose normal. No congestion or rhinorrhea.      Mouth/Throat:      Mouth: Mucous membranes are moist.      Pharynx: Oropharynx is clear. No oropharyngeal exudate or posterior oropharyngeal erythema.   Cardiovascular:      Rate and Rhythm: Normal rate and regular rhythm.      Heart sounds: Normal heart sounds.   Pulmonary:      Effort: Pulmonary effort is normal.      Breath sounds: Normal breath sounds. No wheezing, rhonchi or rales.   Neurological:      Mental Status: She is alert and oriented to person, place, and time. Mental status is at baseline.   Psychiatric:         Mood and Affect: Mood normal.         Behavior: Behavior normal.         Thought Content: Thought content normal.         Judgment: Judgment normal.           REVIEWED DATA     Labs:           Imaging:            Medical Tests:           Summary of old records / correspondence / consultant report:           Request outside records:

## 2023-11-11 ENCOUNTER — HOSPITAL ENCOUNTER (OUTPATIENT)
Facility: HOSPITAL | Age: 33
Discharge: HOME | End: 2023-11-11
Payer: COMMERCIAL

## 2023-11-11 DIAGNOSIS — Z87.01 HISTORY OF PNEUMONIA: ICD-10-CM

## 2023-11-11 PROCEDURE — 71046 X-RAY EXAM CHEST 2 VIEWS: CPT

## 2024-02-14 ENCOUNTER — OFFICE VISIT (OUTPATIENT)
Dept: INTERNAL MEDICINE | Age: 34
End: 2024-02-14
Payer: COMMERCIAL

## 2024-02-14 VITALS
WEIGHT: 119 LBS | SYSTOLIC BLOOD PRESSURE: 124 MMHG | DIASTOLIC BLOOD PRESSURE: 80 MMHG | TEMPERATURE: 98 F | OXYGEN SATURATION: 100 % | BODY MASS INDEX: 18.68 KG/M2 | HEART RATE: 91 BPM | HEIGHT: 67 IN

## 2024-02-14 DIAGNOSIS — K58.1 IRRITABLE BOWEL SYNDROME WITH CONSTIPATION: ICD-10-CM

## 2024-02-14 DIAGNOSIS — J01.00 ACUTE NON-RECURRENT MAXILLARY SINUSITIS: Primary | ICD-10-CM

## 2024-02-14 PROCEDURE — 99213 OFFICE O/P EST LOW 20 MIN: CPT

## 2024-03-06 RX ORDER — AMITRIPTYLINE HYDROCHLORIDE 10 MG/1
10 TABLET, FILM COATED ORAL
Qty: 90 TABLET | Refills: 1 | Status: SHIPPED | OUTPATIENT
Start: 2024-03-06

## 2024-03-08 ENCOUNTER — OFFICE VISIT (OUTPATIENT)
Dept: INTERNAL MEDICINE | Age: 34
End: 2024-03-08
Payer: COMMERCIAL

## 2024-03-08 VITALS
HEIGHT: 67 IN | DIASTOLIC BLOOD PRESSURE: 66 MMHG | WEIGHT: 118 LBS | BODY MASS INDEX: 18.52 KG/M2 | TEMPERATURE: 97.8 F | OXYGEN SATURATION: 99 % | SYSTOLIC BLOOD PRESSURE: 106 MMHG | HEART RATE: 55 BPM

## 2024-03-08 DIAGNOSIS — Z00.00 ANNUAL PHYSICAL EXAM: Primary | ICD-10-CM

## 2024-03-08 LAB
ALBUMIN SERPL-MCNC: 4.5 G/DL (ref 3.5–5.2)
ALBUMIN/GLOB SERPL: 2.4 G/DL
ALP SERPL-CCNC: 64 U/L (ref 39–117)
ALT SERPL-CCNC: 12 U/L (ref 1–33)
APPEARANCE UR: CLEAR
AST SERPL-CCNC: 15 U/L (ref 1–32)
BASOPHILS # BLD AUTO: 0.06 10*3/MM3 (ref 0–0.2)
BASOPHILS NFR BLD AUTO: 1 % (ref 0–1.5)
BILIRUB SERPL-MCNC: 0.5 MG/DL (ref 0–1.2)
BILIRUB UR QL STRIP: NEGATIVE
BUN SERPL-MCNC: 9 MG/DL (ref 6–20)
BUN/CREAT SERPL: 11.1 (ref 7–25)
CALCIUM SERPL-MCNC: 8.9 MG/DL (ref 8.6–10.5)
CHLORIDE SERPL-SCNC: 105 MMOL/L (ref 98–107)
CHOLEST SERPL-MCNC: 157 MG/DL (ref 0–200)
CHOLEST/HDLC SERPL: 1.64 {RATIO}
CO2 SERPL-SCNC: 23.4 MMOL/L (ref 22–29)
COLOR UR: YELLOW
CREAT SERPL-MCNC: 0.81 MG/DL (ref 0.57–1)
EGFRCR SERPLBLD CKD-EPI 2021: 98.4 ML/MIN/1.73
EOSINOPHIL # BLD AUTO: 0.05 10*3/MM3 (ref 0–0.4)
EOSINOPHIL NFR BLD AUTO: 0.8 % (ref 0.3–6.2)
ERYTHROCYTE [DISTWIDTH] IN BLOOD BY AUTOMATED COUNT: 12.3 % (ref 12.3–15.4)
GLOBULIN SER CALC-MCNC: 1.9 GM/DL
GLUCOSE SERPL-MCNC: 84 MG/DL (ref 65–99)
GLUCOSE UR QL STRIP: NEGATIVE
HBA1C MFR BLD: 5.2 % (ref 4.8–5.6)
HCT VFR BLD AUTO: 39.9 % (ref 34–46.6)
HDLC SERPL-MCNC: 96 MG/DL (ref 40–60)
HGB BLD-MCNC: 13.8 G/DL (ref 12–15.9)
HGB UR QL STRIP: NEGATIVE
IMM GRANULOCYTES # BLD AUTO: 0.01 10*3/MM3 (ref 0–0.05)
IMM GRANULOCYTES NFR BLD AUTO: 0.2 % (ref 0–0.5)
KETONES UR QL STRIP: NEGATIVE
LDLC SERPL CALC-MCNC: 46 MG/DL (ref 0–100)
LEUKOCYTE ESTERASE UR QL STRIP: NEGATIVE
LYMPHOCYTES # BLD AUTO: 2.27 10*3/MM3 (ref 0.7–3.1)
LYMPHOCYTES NFR BLD AUTO: 37.5 % (ref 19.6–45.3)
MCH RBC QN AUTO: 33.1 PG (ref 26.6–33)
MCHC RBC AUTO-ENTMCNC: 34.6 G/DL (ref 31.5–35.7)
MCV RBC AUTO: 95.7 FL (ref 79–97)
MONOCYTES # BLD AUTO: 0.42 10*3/MM3 (ref 0.1–0.9)
MONOCYTES NFR BLD AUTO: 6.9 % (ref 5–12)
NEUTROPHILS # BLD AUTO: 3.24 10*3/MM3 (ref 1.7–7)
NEUTROPHILS NFR BLD AUTO: 53.6 % (ref 42.7–76)
NITRITE UR QL STRIP: NEGATIVE
NRBC BLD AUTO-RTO: 0 /100 WBC (ref 0–0.2)
PH UR STRIP: 6.5 [PH] (ref 5–8)
PLATELET # BLD AUTO: 256 10*3/MM3 (ref 140–450)
POTASSIUM SERPL-SCNC: 4.1 MMOL/L (ref 3.5–5.2)
PROT SERPL-MCNC: 6.4 G/DL (ref 6–8.5)
PROT UR QL STRIP: NEGATIVE
RBC # BLD AUTO: 4.17 10*6/MM3 (ref 3.77–5.28)
SODIUM SERPL-SCNC: 141 MMOL/L (ref 136–145)
SP GR UR STRIP: <1.005 (ref 1–1.03)
T4 FREE SERPL-MCNC: 1.23 NG/DL (ref 0.93–1.7)
TRIGL SERPL-MCNC: 79 MG/DL (ref 0–150)
TSH SERPL DL<=0.005 MIU/L-ACNC: 3.42 UIU/ML (ref 0.27–4.2)
UROBILINOGEN UR STRIP-MCNC: ABNORMAL MG/DL
VLDLC SERPL CALC-MCNC: 15 MG/DL (ref 5–40)
WBC # BLD AUTO: 6.05 10*3/MM3 (ref 3.4–10.8)

## 2024-03-08 PROCEDURE — 99395 PREV VISIT EST AGE 18-39: CPT

## 2024-03-08 NOTE — PROGRESS NOTES
"    I N T E R N A L  M E D I C I N E  Shelby Rodriguez, APRN      ENCOUNTER DATE:  03/08/2024    Jose Wadsworth / 33 y.o. / female      CHIEF COMPLAINT     Annual Exam    Followed yearly by pulmonology, Dr. Ulrich for asthma: Asthma is well controlled with albuterol inhaler PRN (using prior to exercise, 5-6x weekly).  Remains on Breo Ellipta inhaler daily, Singulair daily.     Allergic rhinitis: Well controlled on Zyrtec, Flonase.     Followed by OBGYN, Dr. Marcia Nicholas at [facility]: Prescribed oral contraceptives.  Up to date on pap.     Followed by GI, Dr. Viveros for IBS-constipation: Daily Linzess with benefit.  Also amitriptyline 10 mg nightly for insomnia.  She started daily psyllium fiber and has had improvement in constipation.         VITALS     Visit Vitals  /66   Pulse 55   Temp 97.8 °F (36.6 °C)   Ht 170.2 cm (67.01\")   Wt 53.5 kg (118 lb)   LMP 02/27/2024   SpO2 99%   BMI 18.48 kg/m²       BP Readings from Last 3 Encounters:   03/08/24 106/66   02/14/24 124/80   10/18/23 120/80     Wt Readings from Last 3 Encounters:   03/08/24 53.5 kg (118 lb)   02/14/24 54 kg (119 lb)   10/18/23 53.5 kg (118 lb)      Body mass index is 18.48 kg/m².       MEDICATIONS     Current Outpatient Medications on File Prior to Visit   Medication Sig Dispense Refill    albuterol (PROVENTIL HFA;VENTOLIN HFA) 108 (90 Base) MCG/ACT inhaler Inhale 2 puffs.      amitriptyline (ELAVIL) 10 MG tablet TAKE 1 TABLET BY MOUTH EVERY NIGHT AT BEDTIME 90 tablet 1    Breo Ellipta 100-25 MCG/ACT aerosol powder        Cetirizine HCl (ZYRTEC ALLERGY PO) Take by mouth.      cholecalciferol (VITAMIN D3) 25 MCG (1000 UT) tablet Take 1 tablet by mouth Daily.      CVS B-12 1000 MCG tablet controlled-release Take 1 tablet by mouth Daily.  11    drospirenone-ethinyl estradiol (ROB,GIANVI) 3-0.02 MG per tablet TAKE 1 TABLET BY MOUTH EVERY DAY      fluticasone (FLONASE) 50 MCG/ACT nasal spray       linaclotide (Linzess) 72 MCG capsule capsule Take 1 " capsule by mouth Every Morning Before Breakfast. 90 capsule 3    montelukast (SINGULAIR) 10 MG tablet Take 1 tablet by mouth Daily.  5    Probiotic Product (PROBIOTIC PO) Take  by mouth.       No current facility-administered medications on file prior to visit.         HISTORY OF PRESENT ILLNESS     Jose presents for annual health maintenance visit.    General health: good  Lifestyle:  Attempting to lose weight?: No   Diet: eats a well balanced, healthy diet  Exercise: exercises 5+ days weekly, dancing, pilates, yoga, walking  Tobacco: Never used   Alcohol: 2 days/week and 2 drinks/occasion  Work: Full-time  Reproductive health:  Sexually active?: Yes   Sexual problems?: No problems  Concern for STD?: No    Sees Gynecologist?: Yes   Rama/Postmenopausal?: No   Domestic abuse concerns: No   Depression Screenin/14/2024     8:41 AM   PHQ-2/PHQ-9 Depression Screening   Little Interest or Pleasure in Doing Things 0-->not at all   Feeling Down, Depressed or Hopeless 0-->not at all   PHQ-9: Brief Depression Severity Measure Score 0         PHQ-2: 0 (Not depressed)   PHQ-9: 0 (Negative screening for depression)    Patient Care Team:  Shelby Rodriguez APRN as PCP - General (Family Medicine)  Christian Ulrich MD as Consulting Physician (Pulmonary Disease)  Marcia Nicholas MD as Consulting Physician (Obstetrics and Gynecology)  Cha Viveros MD as Consulting Physician (Gastroenterology)      ALLERGIES  Allergies   Allergen Reactions    Clindamycin Phosphate GI Intolerance    Metronidazole GI Intolerance    Neomycin Rash    Nickel Rash    Other Rash     DISPERSE DYES        PFSH:     The following portions of the patient's history were reviewed and updated as appropriate: Allergies / Current Medications / Past Medical History / Surgical History / Social History / Family History    PROBLEM LIST   Patient Active Problem List   Diagnosis    Palpitations    ECG abnormal    Precordial pain    Moderate  persistent asthma without complication    Irritable bowel syndrome with constipation    Anxiety and depression    Allergic rhinitis    Subclinical hypothyroidism       PAST MEDICAL HISTORY  Past Medical History:   Diagnosis Date    Allergic rhinitis     Asthma     Chronic constipation     Heart murmur     Mild    Irritable bowel syndrome 2013    Subclinical hypothyroidism     Upper respiratory infection     Vitamin B 12 deficiency     Vitamin D deficiency        SURGICAL HISTORY  Past Surgical History:   Procedure Laterality Date    APPENDECTOMY N/A 02/05/2016    Dr. Duong Johnson    CERVICAL SPINE ANTERIOR      COLONOSCOPY  12/14/2018    Normal    DENTAL PROCEDURE      x 2    ENDOSCOPY  12/14/2018    Yeast in esophagus    EYE SURGERY  2016    Lasik    LASIK      LEEP      LEEP PROCEDURE    SPINE SURGERY  3/24/22    [facility] at [facility]    UPPER GASTROINTESTINAL ENDOSCOPY  12/14/18       SOCIAL HISTORY  Social History     Socioeconomic History    Marital status:    Tobacco Use    Smoking status: Never    Smokeless tobacco: Never   Vaping Use    Vaping status: Never Used   Substance and Sexual Activity    Alcohol use: Yes     Alcohol/week: 4.0 standard drinks of alcohol     Types: 2 Glasses of wine, 1 Cans of beer, 1 Drinks containing 0.5 oz of alcohol per week     Comment: WEEKENDS AND HOLIDAYS    Drug use: No    Sexual activity: Yes     Partners: Male     Birth control/protection: Condom, Birth control pill       FAMILY HISTORY  Family History   Problem Relation Age of Onset    Gallbladder disease Mother     Irritable bowel syndrome Mother     Hyperlipidemia Mother     Hypertension Mother     Hypothyroidism Mother     Thyroid disease Mother         Hypothyroidism    Hypothyroidism Father     Hypertension Father     Crohn's disease Father     Cancer Father         Skin    Hyperlipidemia Father     Thyroid disease Father         Hypothyroidism    Heart attack Maternal Grandfather     Stroke Paternal Grandmother      Diabetes Maternal Uncle     Cancer Maternal Uncle         Thyroid       IMMUNIZATION HISTORY  Immunization History   Administered Date(s) Administered    COVID-19 (MODERNA) Monovalent Original Booster 11/20/2021    COVID-19 (PFIZER) BIVALENT 12+YRS 09/10/2022    COVID-19 (PFIZER) Purple Cap Monovalent 02/28/2021, 03/29/2021    COVID-19 F23 (PFIZER) 12YRS+ (COMIRNATY) 11/16/2023    Fluzone (or Fluarix & Flulaval for VFC) >6mos 10/23/2019, 09/13/2020, 09/25/2021    Fluzone Quad >6mos (Multi-dose) 12/10/2018    Hep A, Unspecified 05/04/2010    Influenza Injectable Mdck Pf Quad 09/10/2022, 11/16/2023    Influenza, Unspecified 12/07/2018, 09/13/2020, 09/10/2022    Pneumococcal Conjugate 20-Valent (PCV20) 03/06/2023    Tdap 03/06/2023    Typhoid Inactivated 05/04/2010         REVIEW OF SYSTEMS     Review of Systems   Constitutional:  Negative for chills, fever and unexpected weight change.   Respiratory:  Negative for cough, chest tightness and shortness of breath.    Cardiovascular:  Negative for chest pain, palpitations and leg swelling.   Neurological:  Negative for dizziness, weakness, light-headedness and headaches.   Psychiatric/Behavioral:  The patient is not nervous/anxious.          PHYSICAL EXAMINATION     Physical Exam  Vitals reviewed.   Constitutional:       General: She is not in acute distress.     Appearance: Normal appearance. She is not ill-appearing, toxic-appearing or diaphoretic.   HENT:      Head: Normocephalic and atraumatic.      Right Ear: Tympanic membrane, ear canal and external ear normal. There is no impacted cerumen.      Left Ear: Tympanic membrane, ear canal and external ear normal. There is no impacted cerumen.      Nose: Nose normal. No congestion or rhinorrhea.      Mouth/Throat:      Mouth: Mucous membranes are moist.      Pharynx: Oropharynx is clear. No oropharyngeal exudate or posterior oropharyngeal erythema.   Eyes:      Extraocular Movements: Extraocular movements intact.       Conjunctiva/sclera: Conjunctivae normal.      Pupils: Pupils are equal, round, and reactive to light.   Cardiovascular:      Rate and Rhythm: Normal rate and regular rhythm.      Heart sounds: Normal heart sounds.   Pulmonary:      Effort: Pulmonary effort is normal. No respiratory distress.      Breath sounds: Normal breath sounds.   Abdominal:      General: Bowel sounds are normal.      Palpations: Abdomen is soft.      Tenderness: There is no abdominal tenderness.   Musculoskeletal:         General: Normal range of motion.      Cervical back: Normal range of motion and neck supple.      Right lower leg: No edema.      Left lower leg: No edema.   Lymphadenopathy:      Cervical: No cervical adenopathy.   Skin:     General: Skin is warm and dry.   Neurological:      General: No focal deficit present.      Mental Status: She is alert and oriented to person, place, and time. Mental status is at baseline.   Psychiatric:         Mood and Affect: Mood normal.         Behavior: Behavior normal.         Thought Content: Thought content normal.         Judgment: Judgment normal.         REVIEWED DATA      Labs:    Lab Results   Component Value Date     10/11/2023    K 4.3 10/11/2023    CALCIUM 10.0 10/11/2023    AST 10 10/11/2023    ALT 9 10/11/2023    BUN 7 10/11/2023    CREATININE 0.65 10/11/2023    CREATININE 0.78 03/06/2023    CREATININE 0.74 03/15/2022    EGFRIFNONA 91 07/23/2018    EGFRIFAFRI >60 03/15/2022       Lab Results   Component Value Date    GLUCOSE 99 10/11/2023    HGBA1C 5.20 03/06/2023    TSH 4.560 (H) 08/10/2023    FREET4 1.26 08/10/2023       Lab Results   Component Value Date    LDL 47 03/06/2023     (H) 03/06/2023    TRIG 83 03/06/2023    CHOLHDLRATIO 1.57 03/06/2023       Lab Results   Component Value Date    XFJM06DE 75.3 03/06/2023        Lab Results   Component Value Date    WBC 10.17 10/11/2023    HGB 13.0 10/11/2023    MCV 96.7 10/11/2023     10/11/2023       Lab Results    Component Value Date    PROTEIN Negative 03/06/2023    GLUCOSEU Negative 03/06/2023    BLOODU Negative 03/06/2023    NITRITEU Negative 03/06/2023    LEUKOCYTESUR Negative 03/06/2023          Lab Results   Component Value Date    HEPCVIRUSABY Nonreactive 02/12/2024       Imaging:        Medical Tests:        ASSESSMENT & PLAN     ANNUAL WELLNESS EXAM / PHYSICAL     Diagnoses and all orders for this visit:    1. Annual physical exam (Primary)  -     CBC & Differential  -     Comprehensive Metabolic Panel  -     Hemoglobin A1c  -     Lipid Panel With / Chol / HDL Ratio  -     TSH+Free T4  -     Urinalysis With Microscopic If Indicated (No Culture) - Urine, Clean Catch         Summary/Discussion:     Continue to update pap with GYN office.  Follow up with pulmonology as scheduled.    BMI is below normal parameters (malnutrition). Recommendations: incorporate protein rich snacks, consider protein powder supplements or protein shakes      Next Appointment with me: Visit date not found    Return in about 1 year (around 3/8/2025) for Annual physical.      HEALTHCARE MAINTENANCE ISSUES     Cancer Screening:  Colon: Initial/Next screening at age: 45  Repeat colon cancer screening: N/A at this time  Breast: Recommended monthly self exams; annual professional exam  Mammogram: N/A at this time  Cervical: 3 years  Skin: Monthly self skin examination, annual exam by health professional  Lung: Does not meet criteria for lung cancer screening.   Other:    Screening Labs & Tests:  Lab results reviewed & discussed with the patient or test orders placed today.  EKG:  CV Screening: Lipid panel  DEXA (65+ or postmenopausal with risk factors):   HEP C (If born [birth year], or risk factors): Previously had negative screen  Other:     Immunization/Vaccinations (to be given today unless deferred by patient)  Influenza: Patient had the flu shot this season  Hepatitis A: Verify immunization records  Hepatitis B: Verify immunization  records  Tetanus/Pertussis: Up to date  Pneumovax: Up to date  Shingles: Not needed at this time  COVID: Already had the latest booster     Lifestyle Counseling:  Lifestyle Modifications: Continue good lifestyle choices/modifications and Reduce exposure to stress if possible  Safety Issues: Always wear seatbelt, Avoid texting while driving   Use sunscreen, regular skin examination  Recommended annual dental/vision examination.  Emotional/Stress/Sleep: Reviewed and  given when appropriate      Health Maintenance   Topic Date Due    BMI FOLLOWUP  Never done    PAP SMEAR  04/30/2024 (Originally 5/29/2023)    ANNUAL PHYSICAL  03/08/2025    TDAP/TD VACCINES (2 - Td or Tdap) 03/06/2033    HEPATITIS C SCREENING  Completed    COVID-19 Vaccine  Completed    Pneumococcal Vaccine 0-64  Completed    INFLUENZA VACCINE  Completed

## 2024-04-08 ENCOUNTER — TELEPHONE (OUTPATIENT)
Dept: GASTROENTEROLOGY | Facility: CLINIC | Age: 34
End: 2024-04-08
Payer: COMMERCIAL

## 2024-04-16 ENCOUNTER — TELEPHONE (OUTPATIENT)
Dept: GASTROENTEROLOGY | Facility: CLINIC | Age: 34
End: 2024-04-16
Payer: COMMERCIAL

## 2024-04-16 NOTE — TELEPHONE ENCOUNTER
----- Message from Linda San PharmD sent at 4/16/2024 12:10 PM EDT -----  Regarding: FW: Linzess too expensive  Contact: [phone]  Do we have any Linzess samples? I spoke the the pharmacy and the Linzess copay card is still not working from the [facility] and 30d is $377 without it.     Dr. Viveros - if we don't have samples is there another medication she could try? Generic Amitiza is preferred but she's previously had adverse effects with it.  Thanks!  ----- Message -----  From: Alice Guthrie RN  Sent: 4/15/2024  10:01 AM EDT  To: Linda San PharmD  Subject: FW: Linzess too expensive                          ----- Message -----  From: Jose Wadsworth  Sent: 4/13/2024   4:04 PM EDT  To: [facility]  Subject: Linzess too expensive                            Hello,    The email didn’t say anything about a discount card, and the pharmacy said the supposed discount info had to be put in instead of my health insurance and resulted in a price increase. The three month price was over $700 to start with at the pharmacy (compared to the $1400 projected in the prescription pricing tool), so maybe the discount had been applied in some way, it just wasn’t coming anywhere close to $30/month.  I’m almost entirely out of linzess now and I’ve really been needing it lately, but over $200/month just feels unreasonable. Please advise.     Thanks,

## 2024-04-19 ENCOUNTER — OFFICE VISIT (OUTPATIENT)
Dept: INTERNAL MEDICINE | Age: 34
End: 2024-04-19
Payer: COMMERCIAL

## 2024-04-19 ENCOUNTER — HOSPITAL ENCOUNTER (OUTPATIENT)
Facility: HOSPITAL | Age: 34
Discharge: HOME | End: 2024-04-19
Payer: COMMERCIAL

## 2024-04-19 VITALS
DIASTOLIC BLOOD PRESSURE: 70 MMHG | OXYGEN SATURATION: 98 % | HEIGHT: 67 IN | TEMPERATURE: 97.5 F | BODY MASS INDEX: 18.36 KG/M2 | WEIGHT: 117 LBS | SYSTOLIC BLOOD PRESSURE: 100 MMHG | HEART RATE: 99 BPM

## 2024-04-19 DIAGNOSIS — R05.1 ACUTE COUGH: Primary | ICD-10-CM

## 2024-04-19 PROCEDURE — 99213 OFFICE O/P EST LOW 20 MIN: CPT

## 2024-04-19 PROCEDURE — 71046 X-RAY EXAM CHEST 2 VIEWS: CPT

## 2024-04-19 NOTE — PROGRESS NOTES
"    I N T E R N A L  M E D I C I N E  Shelby Rodriguez, APRN    ENCOUNTER DATE:  04/19/2024    Jose Wadsworth / 33 y.o. / female      CHIEF COMPLAINT / REASON FOR OFFICE VISIT     URI (Fatiuge ,headache , congestion, drainage, sob,wheezing ,cough, low grade 100.3. started wed.  pos for strep. She is neg for strep and covid. )      ASSESSMENT & PLAN     Diagnoses and all orders for this visit:    1. Acute cough (Primary)  -     XR Chest 2 View         SUMMARY/DISCUSSION  Ultimately suspect viral illness, and recommend symptomatic treatment with Mucinex DM, good hydration with water, rest.  Given that she has left lower lobe crackles on exam, and prior history of PNA will rule out PNA with Chest XR.  She is aware of importance of returning for non improving symptoms and visiting ER for acutely worsening symptoms.        Next Appointment with me: Visit date not found    Return for Next scheduled follow up.      VITAL SIGNS     Visit Vitals  /70   Pulse 99   Temp 97.5 °F (36.4 °C)   Ht 170.2 cm (67.01\")   Wt 53.1 kg (117 lb)   LMP 04/19/2024   SpO2 98%   BMI 18.32 kg/m²             Wt Readings from Last 3 Encounters:   04/19/24 53.1 kg (117 lb)   03/08/24 53.5 kg (118 lb)   02/14/24 54 kg (119 lb)     Body mass index is 18.32 kg/m².        MEDICATIONS AT THE TIME OF OFFICE VISIT     Current Outpatient Medications on File Prior to Visit   Medication Sig Dispense Refill    albuterol (PROVENTIL HFA;VENTOLIN HFA) 108 (90 Base) MCG/ACT inhaler Inhale 2 puffs.      amitriptyline (ELAVIL) 10 MG tablet TAKE 1 TABLET BY MOUTH EVERY NIGHT AT BEDTIME 90 tablet 1    Breo Ellipta 100-25 MCG/ACT aerosol powder        Cetirizine HCl (ZYRTEC ALLERGY PO) Take by mouth.      cholecalciferol (VITAMIN D3) 25 MCG (1000 UT) tablet Take 1 tablet by mouth Daily.      [employer] B-12 1000 MCG tablet controlled-release Take 1 tablet by mouth Daily.  11    drospirenone-ethinyl estradiol (ROB,GIANVI) 3-0.02 MG per tablet TAKE 1 TABLET BY " "MOUTH EVERY DAY      fluticasone (FLONASE) 50 MCG/ACT nasal spray       linaclotide (Linzess) 72 MCG capsule capsule Take 1 capsule by mouth Every Morning Before Breakfast. 90 capsule 3    montelukast (SINGULAIR) 10 MG tablet Take 1 tablet by mouth Daily.  5    Probiotic Product (PROBIOTIC PO) Take  by mouth.       No current facility-administered medications on file prior to visit.        HISTORY OF PRESENT ILLNESS     Here today for fatigue, headache, bilateral ear pressure, mild congestion, post nasal drainage, some non productive cough since Wednesday.  Upon waking this morning, felt some left lower chest \"crackling.\"    is ill with Strep throat.  She visited the [facility] yesterday and tested negative for Strep and was diagnosed with viral URI.  Tested at home for COVID-19, negative.  Temperature at home has increased up to 100.3, + chills.  Some scratchy throat at start of illness but that has resolved.  Medical history significant for asthma and remains on all medications as prescribed.  Has noticed some shortness of breath with exertion but no chest tightness/ wheezing.  Eating, drinking, urinating and swallowing well.  Medical history significant for prior PNA, treated in October 2023.      Patient Care Team:  Shelby Rodriguez APRN as PCP - General (Family Medicine)  Christian Ulrich MD as Consulting Physician (Pulmonary Disease)  Marcia Nicholas MD as Consulting Physician (Obstetrics and Gynecology)  Cha Viveros MD as Consulting Physician (Gastroenterology)    REVIEW OF SYSTEMS     Review of Systems   Constitutional:  Negative for chills, fever and unexpected weight change.   HENT:  Positive for congestion, postnasal drip and sore throat (Recent history, but now resolved). Negative for ear pain (Bilateral ear \"pressure\"), sinus pressure and sinus pain.    Respiratory:  Positive for cough and shortness of breath (With exertion). Negative for chest tightness.    Cardiovascular:  " Negative for chest pain, palpitations and leg swelling.   Neurological:  Negative for dizziness, weakness, light-headedness and headaches.   Psychiatric/Behavioral:  The patient is not nervous/anxious.           PHYSICAL EXAMINATION     Physical Exam  Vitals reviewed.   Constitutional:       General: She is not in acute distress.     Appearance: Normal appearance. She is not ill-appearing, toxic-appearing or diaphoretic.   HENT:      Head: Normocephalic and atraumatic.      Right Ear: Tympanic membrane, ear canal and external ear normal. There is no impacted cerumen.      Left Ear: Tympanic membrane, ear canal and external ear normal. There is no impacted cerumen.      Nose: Nose normal. No congestion or rhinorrhea.      Mouth/Throat:      Mouth: Mucous membranes are moist.      Pharynx: Oropharynx is clear. No oropharyngeal exudate or posterior oropharyngeal erythema.   Cardiovascular:      Rate and Rhythm: Normal rate and regular rhythm.      Heart sounds: Normal heart sounds.   Pulmonary:      Effort: Pulmonary effort is normal.      Breath sounds: Rales (Fine, left lower lobe) present. No wheezing or rhonchi.   Lymphadenopathy:      Cervical: No cervical adenopathy.   Neurological:      Mental Status: She is alert and oriented to person, place, and time. Mental status is at baseline.   Psychiatric:         Mood and Affect: Mood normal.         Behavior: Behavior normal.         Thought Content: Thought content normal.         Judgment: Judgment normal.           REVIEWED DATA     Labs:           Imaging:            Medical Tests:           Summary of old records / correspondence / consultant report:           Request outside records:

## 2024-05-03 ENCOUNTER — OFFICE VISIT (OUTPATIENT)
Dept: INTERNAL MEDICINE | Age: 34
End: 2024-05-03
Payer: COMMERCIAL

## 2024-05-03 VITALS
SYSTOLIC BLOOD PRESSURE: 114 MMHG | HEART RATE: 100 BPM | WEIGHT: 114.6 LBS | TEMPERATURE: 97.9 F | BODY MASS INDEX: 17.99 KG/M2 | OXYGEN SATURATION: 97 % | DIASTOLIC BLOOD PRESSURE: 66 MMHG | HEIGHT: 67 IN

## 2024-05-03 DIAGNOSIS — J01.00 ACUTE NON-RECURRENT MAXILLARY SINUSITIS: Primary | ICD-10-CM

## 2024-05-03 DIAGNOSIS — J45.40 MODERATE PERSISTENT ASTHMA WITHOUT COMPLICATION: ICD-10-CM

## 2024-05-03 PROCEDURE — 99214 OFFICE O/P EST MOD 30 MIN: CPT

## 2024-05-03 RX ORDER — CEFDINIR 300 MG/1
300 CAPSULE ORAL 2 TIMES DAILY
Qty: 14 CAPSULE | Refills: 0 | Status: SHIPPED | OUTPATIENT
Start: 2024-05-03 | End: 2024-05-10

## 2024-05-03 NOTE — PROGRESS NOTES
"    I N T E R N A L  M E D I C I N E  Shelby Rodriguez, APRN    ENCOUNTER DATE:  05/03/2024    Jose Wadsworth / 33 y.o. / female      CHIEF COMPLAINT / REASON FOR OFFICE VISIT     URI and Cough (Productive with green mucus )      ASSESSMENT & PLAN     Diagnoses and all orders for this visit:    1. Acute non-recurrent maxillary sinusitis (Primary)  -     cefdinir (OMNICEF) 300 MG capsule; Take 1 capsule by mouth 2 (Two) Times a Day for 7 days.  Dispense: 14 capsule; Refill: 0    2. Moderate persistent asthma without complication         SUMMARY/DISCUSSION  Given persistent cough symptoms, discussed option of repeating Chest XR to rule out progression into PNA, and also to expand antibiotic coverage to include both cefdinir/ doxycycline to cover PNA.  She declines additional imaging and would like to limit antibiotic use.  Ultimately, her symptoms have improved over the last 48 hours, and she remains with persistent sinus pressure/ pain, despite completing Z Ba, consistent with maxillary sinusitis.  Will treat with cefdinir to cover lower respiratory pathogens as well.  She should continue on asthma medications as prescribed.  Ensure good hydration with water, rest.  Aware to visit ER for acutely worsening symptoms.  She will provide me with a status update on her condition in 48 hours.        Next Appointment with me: Visit date not found    Return for Next scheduled follow up.      VITAL SIGNS     Visit Vitals  /66 (BP Location: Right arm, Patient Position: Sitting, Cuff Size: Adult)   Pulse 100   Temp 97.9 °F (36.6 °C) (Temporal)   Ht 170.2 cm (67.01\")   Wt 52 kg (114 lb 9.6 oz)   LMP 04/19/2024   SpO2 97%   BMI 17.94 kg/m²             Wt Readings from Last 3 Encounters:   05/03/24 52 kg (114 lb 9.6 oz)   04/26/24 52.2 kg (115 lb)   04/19/24 53.1 kg (117 lb)     Body mass index is 17.94 kg/m².        MEDICATIONS AT THE TIME OF OFFICE VISIT     Current Outpatient Medications on File Prior to Visit "   Medication Sig Dispense Refill    albuterol (PROVENTIL HFA;VENTOLIN HFA) 108 (90 Base) MCG/ACT inhaler Inhale 2 puffs.      amitriptyline (ELAVIL) 10 MG tablet TAKE 1 TABLET BY MOUTH EVERY NIGHT AT BEDTIME 90 tablet 1    Breo Ellipta 100-25 MCG/ACT aerosol powder        Cetirizine HCl (ZYRTEC ALLERGY PO) Take by mouth.      cholecalciferol (VITAMIN D3) 25 MCG (1000 UT) tablet Take 1 tablet by mouth Daily.      CVS B-12 1000 MCG tablet controlled-release Take 1 tablet by mouth Daily.  11    drospirenone-ethinyl estradiol (ROB,GIANVI) 3-0.02 MG per tablet TAKE 1 TABLET BY MOUTH EVERY DAY      fluticasone (FLONASE) 50 MCG/ACT nasal spray       linaclotide (Linzess) 72 MCG capsule capsule Take 1 capsule by mouth Every Morning Before Breakfast. 90 capsule 3    montelukast (SINGULAIR) 10 MG tablet Take 1 tablet by mouth Daily.  5    Probiotic Product (PROBIOTIC PO) Take  by mouth.      promethazine-dextromethorphan (PROMETHAZINE-DM) 6.25-15 MG/5ML syrup Take 5 mL by mouth 3 (Three) Times a Day As Needed for Cough for up to 7 days. 105 mL 0    [DISCONTINUED] azithromycin (Zithromax Z-Ba) 250 MG tablet Take 2 tablets by mouth on day 1, then 1 tablet daily on days 2-5 (Patient not taking: Reported on 5/3/2024) 6 tablet 0    [DISCONTINUED] methylPREDNISolone (MEDROL) 4 MG dose pack Take as directed on package instructions. (Patient not taking: Reported on 5/3/2024) 21 tablet 0     No current facility-administered medications on file prior to visit.        HISTORY OF PRESENT ILLNESS     Seen by Urgent Care on April 26, 2024 and completed Z Ba and medrol dose pack for bronchitis, rhino sinusitis.  Reports subjective fever, chills, but remains without fever measured by home thermometer.  Nasal/ chest congestion has improved some but continues with sinus pressure/ pain and productive cough.  Over the last two days, she is feeling some better.  No wheezing, dyspnea.  Remains on prescribed asthma medications.  April 19, 2024  Chest XR was normal.       Patient Care Team:  Shelby Rodriguez APRN as PCP - General (Family Medicine)  Christian Ulrich MD as Consulting Physician (Pulmonary Disease)  Marcia Nicholas MD as Consulting Physician (Obstetrics and Gynecology)  Cha Viveros MD as Consulting Physician (Gastroenterology)    REVIEW OF SYSTEMS     Review of Systems   Constitutional:  Positive for chills. Negative for fever and unexpected weight change.   HENT:  Positive for congestion, postnasal drip, sinus pressure and sinus pain. Negative for ear pain, rhinorrhea and sore throat.    Respiratory:  Positive for cough. Negative for chest tightness, shortness of breath and wheezing.    Cardiovascular:  Negative for chest pain, palpitations and leg swelling.   Musculoskeletal:  Positive for myalgias.   Skin:  Negative for rash.   Neurological:  Positive for headaches. Negative for dizziness, weakness and light-headedness.   Psychiatric/Behavioral:  The patient is not nervous/anxious.           PHYSICAL EXAMINATION     Physical Exam  Vitals reviewed.   Constitutional:       General: She is not in acute distress.     Appearance: Normal appearance. She is not ill-appearing, toxic-appearing or diaphoretic.   HENT:      Head: Normocephalic and atraumatic.      Right Ear: Tympanic membrane, ear canal and external ear normal. There is no impacted cerumen.      Left Ear: Tympanic membrane, ear canal and external ear normal. There is no impacted cerumen.      Nose: Nose normal. Congestion present. No rhinorrhea.      Right Sinus: Maxillary sinus tenderness present. No frontal sinus tenderness.      Left Sinus: Maxillary sinus tenderness present. No frontal sinus tenderness.      Mouth/Throat:      Mouth: Mucous membranes are moist.      Pharynx: Oropharynx is clear. Posterior oropharyngeal erythema present. No oropharyngeal exudate.   Cardiovascular:      Rate and Rhythm: Normal rate and regular rhythm.      Heart sounds: Normal heart  sounds.   Pulmonary:      Effort: Pulmonary effort is normal.      Breath sounds: Normal breath sounds. No wheezing, rhonchi or rales.   Lymphadenopathy:      Cervical: No cervical adenopathy.   Neurological:      Mental Status: She is alert and oriented to person, place, and time. Mental status is at baseline.   Psychiatric:         Mood and Affect: Mood normal.         Behavior: Behavior normal.         Thought Content: Thought content normal.         Judgment: Judgment normal.           REVIEWED DATA     Labs:           Imaging:            Medical Tests:           Summary of old records / correspondence / consultant report:           Request outside records:

## 2024-05-13 ENCOUNTER — HOSPITAL ENCOUNTER (OUTPATIENT)
Facility: HOSPITAL | Age: 34
Discharge: HOME | End: 2024-05-13
Payer: COMMERCIAL

## 2024-05-13 DIAGNOSIS — R06.2 WHEEZING: Primary | ICD-10-CM

## 2024-05-13 PROCEDURE — 71046 X-RAY EXAM CHEST 2 VIEWS: CPT

## 2024-05-14 ENCOUNTER — LAB (OUTPATIENT)
Facility: HOSPITAL | Age: 34
End: 2024-05-14
Payer: COMMERCIAL

## 2024-05-14 ENCOUNTER — OFFICE VISIT (OUTPATIENT)
Dept: INTERNAL MEDICINE | Age: 34
End: 2024-05-14
Payer: COMMERCIAL

## 2024-05-14 VITALS
DIASTOLIC BLOOD PRESSURE: 62 MMHG | WEIGHT: 113.4 LBS | SYSTOLIC BLOOD PRESSURE: 100 MMHG | TEMPERATURE: 97.4 F | RESPIRATION RATE: 14 BRPM | BODY MASS INDEX: 17.8 KG/M2 | HEIGHT: 67 IN | HEART RATE: 100 BPM | OXYGEN SATURATION: 98 %

## 2024-05-14 DIAGNOSIS — R07.81 PLEURITIC CHEST PAIN: Primary | ICD-10-CM

## 2024-05-14 DIAGNOSIS — J45.40 MODERATE PERSISTENT ASTHMA WITHOUT COMPLICATION: ICD-10-CM

## 2024-05-14 LAB
ALBUMIN SERPL-MCNC: 4.1 G/DL (ref 3.5–5.2)
ALBUMIN/GLOB SERPL: 1.6 G/DL
ALP SERPL-CCNC: 68 U/L (ref 39–117)
ALT SERPL W P-5'-P-CCNC: 7 U/L (ref 1–33)
ANION GAP SERPL CALCULATED.3IONS-SCNC: 10.3 MMOL/L (ref 5–15)
AST SERPL-CCNC: 12 U/L (ref 1–32)
BASOPHILS # BLD AUTO: 0.08 10*3/MM3 (ref 0–0.2)
BASOPHILS NFR BLD AUTO: 1.3 % (ref 0–1.5)
BILIRUB SERPL-MCNC: 0.3 MG/DL (ref 0–1.2)
BUN SERPL-MCNC: 13 MG/DL (ref 6–20)
BUN/CREAT SERPL: 15.9 (ref 7–25)
CALCIUM SPEC-SCNC: 9.3 MG/DL (ref 8.6–10.5)
CHLORIDE SERPL-SCNC: 106 MMOL/L (ref 98–107)
CO2 SERPL-SCNC: 24.7 MMOL/L (ref 22–29)
CREAT SERPL-MCNC: 0.82 MG/DL (ref 0.57–1)
D DIMER PPP FEU-MCNC: <0.27 MCGFEU/ML (ref 0–0.5)
DEPRECATED RDW RBC AUTO: 43.9 FL (ref 37–54)
EGFRCR SERPLBLD CKD-EPI 2021: 97 ML/MIN/1.73
EOSINOPHIL # BLD AUTO: 0.04 10*3/MM3 (ref 0–0.4)
EOSINOPHIL NFR BLD AUTO: 0.6 % (ref 0.3–6.2)
ERYTHROCYTE [DISTWIDTH] IN BLOOD BY AUTOMATED COUNT: 12.2 % (ref 12.3–15.4)
GLOBULIN UR ELPH-MCNC: 2.5 GM/DL
GLUCOSE SERPL-MCNC: 85 MG/DL (ref 65–99)
HCT VFR BLD AUTO: 41.2 % (ref 34–46.6)
HGB BLD-MCNC: 13.6 G/DL (ref 12–15.9)
HOLD SPECIMEN: NORMAL
IMM GRANULOCYTES # BLD AUTO: 0.01 10*3/MM3 (ref 0–0.05)
IMM GRANULOCYTES NFR BLD AUTO: 0.2 % (ref 0–0.5)
LYMPHOCYTES # BLD AUTO: 2.68 10*3/MM3 (ref 0.7–3.1)
LYMPHOCYTES NFR BLD AUTO: 42.7 % (ref 19.6–45.3)
MCH RBC QN AUTO: 32.2 PG (ref 26.6–33)
MCHC RBC AUTO-ENTMCNC: 33 G/DL (ref 31.5–35.7)
MCV RBC AUTO: 97.6 FL (ref 79–97)
MONOCYTES # BLD AUTO: 0.34 10*3/MM3 (ref 0.1–0.9)
MONOCYTES NFR BLD AUTO: 5.4 % (ref 5–12)
NEUTROPHILS NFR BLD AUTO: 3.12 10*3/MM3 (ref 1.7–7)
NEUTROPHILS NFR BLD AUTO: 49.8 % (ref 42.7–76)
NRBC BLD AUTO-RTO: 0 /100 WBC (ref 0–0.2)
PLATELET # BLD AUTO: 384 10*3/MM3 (ref 140–450)
PMV BLD AUTO: 9.5 FL (ref 6–12)
POTASSIUM SERPL-SCNC: 4 MMOL/L (ref 3.5–5.2)
PROT SERPL-MCNC: 6.6 G/DL (ref 6–8.5)
RBC # BLD AUTO: 4.22 10*6/MM3 (ref 3.77–5.28)
SODIUM SERPL-SCNC: 141 MMOL/L (ref 136–145)
WBC NRBC COR # BLD AUTO: 6.27 10*3/MM3 (ref 3.4–10.8)

## 2024-05-14 PROCEDURE — 85379 FIBRIN DEGRADATION QUANT: CPT

## 2024-05-14 PROCEDURE — 99214 OFFICE O/P EST MOD 30 MIN: CPT

## 2024-05-14 PROCEDURE — 36415 COLL VENOUS BLD VENIPUNCTURE: CPT

## 2024-05-14 PROCEDURE — 93000 ELECTROCARDIOGRAM COMPLETE: CPT

## 2024-05-14 PROCEDURE — 80053 COMPREHEN METABOLIC PANEL: CPT

## 2024-05-14 PROCEDURE — 85025 COMPLETE CBC W/AUTO DIFF WBC: CPT

## 2024-05-14 NOTE — PROGRESS NOTES
"    I N T E R N A L  M E D I C I BHAVANA Arzate    ENCOUNTER DATE:  05/14/2024    Jose Wadsworth / 33 y.o. / female      CHIEF COMPLAINT / REASON FOR OFFICE VISIT     Asthma, Cough, and Shortness of Breath (Wheezing fup)      ASSESSMENT & PLAN     Diagnoses and all orders for this visit:    1. Pleuritic chest pain (Primary)  -     CBC & Differential  -     Comprehensive Metabolic Panel  -     D-dimer, Quantitative  -     ECG 12 Lead    2. Moderate persistent asthma without complication         SUMMARY/DISCUSSION  Hemodynamically stable, and symptomatically, she is improving.  No wheezing heard on today's exam.  EKG with NSR.  Will rule out PE with D-dimer and review CBC/ CMP for infection.  Recommend she undergo CT Chest evaluation for her persistent sensation of left sided chest wheezing.   She is aware to visit ER for acutely worsening symptoms.  She should continue asthma medications as prescribed and ensure close follow up with pulmonology office.        ECG 12 Lead    Date/Time: 5/14/2024 11:55 AM  Performed by: Shelby Rodriguez APRN    Authorized by: Shelby Rodriguez APRN  Comparison: compared with previous ECG from 12/15/2021  Rhythm: sinus rhythm    Clinical impression: normal ECG          Next Appointment with me: Visit date not found    Return for Next scheduled follow up.      VITAL SIGNS     Visit Vitals  /62   Pulse 100   Temp 97.4 °F (36.3 °C) (Temporal)   Resp 14   Ht 170.2 cm (67.01\")   Wt 51.4 kg (113 lb 6.4 oz)   LMP 04/14/2024 (Approximate)   SpO2 98%   BMI 17.76 kg/m²             Wt Readings from Last 3 Encounters:   05/14/24 51.4 kg (113 lb 6.4 oz)   05/03/24 52 kg (114 lb 9.6 oz)   04/26/24 52.2 kg (115 lb)     Body mass index is 17.76 kg/m².        MEDICATIONS AT THE TIME OF OFFICE VISIT     Current Outpatient Medications on File Prior to Visit   Medication Sig Dispense Refill    albuterol (PROVENTIL HFA;VENTOLIN HFA) 108 (90 Base) MCG/ACT inhaler Inhale 2 puffs.      " "amitriptyline (ELAVIL) 10 MG tablet TAKE 1 TABLET BY MOUTH EVERY NIGHT AT BEDTIME 90 tablet 1    Breo Ellipta 100-25 MCG/ACT aerosol powder        Cetirizine HCl (ZYRTEC ALLERGY PO) Take by mouth.      cholecalciferol (VITAMIN D3) 25 MCG (1000 UT) tablet Take 1 tablet by mouth Daily.      CVS B-12 1000 MCG tablet controlled-release Take 1 tablet by mouth Daily.  11    drospirenone-ethinyl estradiol (ROB,GIANVI) 3-0.02 MG per tablet TAKE 1 TABLET BY MOUTH EVERY DAY      fluticasone (FLONASE) 50 MCG/ACT nasal spray       linaclotide (Linzess) 72 MCG capsule capsule Take 1 capsule by mouth Every Morning Before Breakfast. 90 capsule 3    montelukast (SINGULAIR) 10 MG tablet Take 1 tablet by mouth Daily.  5    Probiotic Product (PROBIOTIC PO) Take  by mouth.       No current facility-administered medications on file prior to visit.        HISTORY OF PRESENT ILLNESS     Now with 1 month history of persistent, palpable left sided chest \"wheezing.\"  Her  has noted palpable chest wall \"rattling\" on left side.  Reports associated left sided anterior and posterior chest discomfort felt with deep inspiration at times.  Previously completed Z Ba and medrol dose pack for bronchitis, rhino sinusitis, prescribed by  on April 26, 2024.  Re evaluated in our office on May 3, 2024 given persistent sinus pressure and prescribed cefdinir, which she has since completed with benefit.  April 19, 2024 Chest XR was normal. Medical history significant for asthma and remains on prescribed inhalers.  Followed by pulmonology, Dr. Ulrich.  Does take oral contraceptives, but otherwise no abnormal clotting history, recent lengthy travel.      Continues with some lingering fatigue, some shortness of breath with exertion, non productive cough, wheezing at times but otherwise feels much improved prior to start of illness.  No fever, chills.  Updated Chest XR on May 13, 2024 showed no focal consolidation or effusion.  CT Chest was ordered for " further evaluation; she has not yet scheduled.     Patient Care Team:  Shelby Rodriguez APRN as PCP - General (Family Medicine)  Christian Ulrich MD as Consulting Physician (Pulmonary Disease)  Marcia Nicholas MD as Consulting Physician (Obstetrics and Gynecology)  Cha Viveros MD as Consulting Physician (Gastroenterology)    REVIEW OF SYSTEMS     Review of Systems   Constitutional:  Negative for chills, fever and unexpected weight change.   Respiratory:  Positive for cough, chest tightness, shortness of breath and wheezing.    Cardiovascular:  Positive for chest pain (Left). Negative for palpitations and leg swelling.   Neurological:  Negative for dizziness, weakness, light-headedness and headaches.   Psychiatric/Behavioral:  The patient is not nervous/anxious.           PHYSICAL EXAMINATION     Physical Exam  Vitals reviewed.   Constitutional:       General: She is not in acute distress.     Appearance: Normal appearance. She is not ill-appearing, toxic-appearing or diaphoretic.   HENT:      Head: Normocephalic and atraumatic.      Right Ear: Tympanic membrane, ear canal and external ear normal. There is no impacted cerumen.      Left Ear: Tympanic membrane, ear canal and external ear normal. There is no impacted cerumen.      Nose: Nose normal. No congestion or rhinorrhea.   Cardiovascular:      Rate and Rhythm: Normal rate and regular rhythm.      Heart sounds: Normal heart sounds.   Pulmonary:      Effort: Pulmonary effort is normal.      Breath sounds: Normal breath sounds. No wheezing, rhonchi or rales.   Musculoskeletal:      Right lower leg: No edema.      Left lower leg: No edema.   Neurological:      Mental Status: She is alert and oriented to person, place, and time. Mental status is at baseline.   Psychiatric:         Mood and Affect: Mood normal.         Behavior: Behavior normal.         Thought Content: Thought content normal.         Judgment: Judgment normal.           REVIEWED DATA      Labs:           Imaging:            Medical Tests:           Summary of old records / correspondence / consultant report:           Request outside records:

## 2024-05-31 ENCOUNTER — TELEPHONE (OUTPATIENT)
Dept: INTERNAL MEDICINE | Age: 34
End: 2024-05-31
Payer: COMMERCIAL

## 2024-05-31 NOTE — TELEPHONE ENCOUNTER
Nilsa with [facility] is calling because Jose Wadsworth is calling them.  They are trying to find out what is needed and wanted us to call back and advise.

## 2024-06-06 NOTE — TELEPHONE ENCOUNTER
Detail Level: Detailed Personally returned call.  Nilsa unavailable.  Message left asking Nilsa to return call.   Wound Evaluated By:  Additional Comments: Patient hasn’t received mupirocin ointment as of today, [facility], will reevaluate site in 2weeks

## 2024-06-12 ENCOUNTER — HOSPITAL ENCOUNTER (OUTPATIENT)
Facility: HOSPITAL | Age: 34
Discharge: HOME | End: 2024-06-12
Payer: COMMERCIAL

## 2024-06-12 DIAGNOSIS — R06.2 WHEEZING: ICD-10-CM

## 2024-06-12 PROCEDURE — 71250 CT THORAX DX C-: CPT

## 2024-06-27 ENCOUNTER — OFFICE VISIT (OUTPATIENT)
Dept: INTERNAL MEDICINE | Age: 34
End: 2024-06-27
Payer: COMMERCIAL

## 2024-06-27 VITALS
BODY MASS INDEX: 17.76 KG/M2 | RESPIRATION RATE: 18 BRPM | HEIGHT: 67 IN | OXYGEN SATURATION: 96 % | TEMPERATURE: 98.2 F | HEART RATE: 87 BPM

## 2024-06-27 DIAGNOSIS — J02.9 ACUTE PHARYNGITIS, UNSPECIFIED ETIOLOGY: Primary | ICD-10-CM

## 2024-06-27 LAB
EXPIRATION DATE: NORMAL
INTERNAL CONTROL: NORMAL
Lab: NORMAL
S PYO AG THROAT QL: NEGATIVE

## 2024-06-27 PROCEDURE — 87880 STREP A ASSAY W/OPTIC: CPT | Performed by: PHYSICIAN ASSISTANT

## 2024-06-27 PROCEDURE — 99213 OFFICE O/P EST LOW 20 MIN: CPT | Performed by: PHYSICIAN ASSISTANT

## 2024-06-27 NOTE — PROGRESS NOTES
I N T E R N A L  M E D I C I N E    Jean-Pierre Gerber PA-C      ENCOUNTER DATE:  06/27/2024    Jose Wadsworth / 33 y.o. / female    CHIEF COMPLAINT / REASON FOR OFFICE VISIT     Earache, Nasal Congestion, and Sore Throat (Symptoms has been going since Tuesday of this week mucus from her nasal is green. No fever )      ASSESSMENT & PLAN   Diagnoses and all orders for this visit:    1. Acute pharyngitis, unspecified etiology (Primary)  Assessment & Plan:      Symptom Relief for Viral Illnesses       1. DIAGNOSIS  2. GENERAL INSTRUCTIONS   Viral sore throat    You have been diagnosed with an illness caused by a virus.  Antibiotics do not work on viruses.  When antibiotics aren't needed, they won't help you, and the side effects could still hurt you.  The treatments prescribed below will help you feel better while your body fights off the virus.   Drink extra water and fluids  Use a cool mist vaporizer or saline nasal spray to relieve congestion  For sore throats use ice chips, sore throat spray, or lozenges  Use honey to relieve cough.  Do not give honey to an infant younger than 1 year.      3. SPECIFIC MEDICINES  4. FOLLOW UP   Use over-the-counter medications specific to your symptoms. Use medicines according to the package instructions or as directed by your healthcare professional.  Stop the medication when the symptoms get better.   If not improved in 3-5 days, if new symptoms occur, or if you have other concerns, please call or return to the office for a recheck.         To learn more about antibiotic prescribing and use, visit www.cdc.gov/antibiotic-use     Orders:  -     POC Rapid Strep A        Internal Control Lot Number Expiration Date Rapid Strep A Screen   Latest Ref Rng Passed    Negative, VALID, INVALID, Not Performed    6/27/2024 Passed  [id number]  2025-01-01  Negative        Next Appointment with me: Visit date not found    No follow-ups on file.        VITAL SIGNS     Vitals:    06/27/24 1329  "  Pulse: 87   Resp: 18   Temp: 98.2 °F (36.8 °C)   SpO2: 96%   Height: 170.2 cm (67.01\")       @BP@  Wt Readings from Last 3 Encounters:   05/14/24 51.4 kg (113 lb 6.4 oz)   05/03/24 52 kg (114 lb 9.6 oz)   04/26/24 52.2 kg (115 lb)     Body mass index is 17.76 kg/m².      MEDICATIONS AT THE TIME OF OFFICE VISIT     Current Outpatient Medications on File Prior to Visit   Medication Sig    albuterol (PROVENTIL HFA;VENTOLIN HFA) 108 (90 Base) MCG/ACT inhaler Inhale 2 puffs.    amitriptyline (ELAVIL) 10 MG tablet TAKE 1 TABLET BY MOUTH EVERY NIGHT AT BEDTIME    Breo Ellipta 100-25 MCG/ACT aerosol powder      Cetirizine HCl (ZYRTEC ALLERGY PO) Take by mouth.    cholecalciferol (VITAMIN D3) 25 MCG (1000 UT) tablet Take 1 tablet by mouth Daily.    CVS B-12 1000 MCG tablet controlled-release Take 1 tablet by mouth Daily.    drospirenone-ethinyl estradiol (ROB,GIANVI) 3-0.02 MG per tablet TAKE 1 TABLET BY MOUTH EVERY DAY    fluticasone (FLONASE) 50 MCG/ACT nasal spray     linaclotide (Linzess) 72 MCG capsule capsule Take 1 capsule by mouth Every Morning Before Breakfast.    montelukast (SINGULAIR) 10 MG tablet Take 1 tablet by mouth Daily.    Probiotic Product (PROBIOTIC PO) Take  by mouth.     No current facility-administered medications on file prior to visit.          HISTORY OF PRESENT ILLNESS     Pt is a 32y/o wf who presents with symptoms of sore throat x 2 days.     She admits pain with swallowing, ear ache, nasal congestion, post-nasal drip, and facial sinus pressure. She believes her current symptoms resemble strep throat and is seeking strep screening.     She denies fever, chills, cough, loss of smell or taste, nausea, vomiting, diarrhea.     Patient expresses disappointment with having to wait past her scheduled appointment time. She admits her consternation due to needing to get back to work as soon as possible, due to being short-staffed. Apologies were made by the office, and the patient stated understanding "         REVIEW OF SYSTEMS     Review of Systems   Constitutional: Negative.    HENT:  Positive for congestion, ear pain, postnasal drip, rhinorrhea, sinus pressure and sore throat.    Respiratory:  Negative for cough.    Gastrointestinal:  Negative for diarrhea, nausea and vomiting.   Neurological:  Negative for headaches.            PHYSICAL EXAMINATION     Physical Exam  HENT:      Right Ear: External ear normal.      Left Ear: External ear normal.      Mouth/Throat:      Mouth: Mucous membranes are moist.      Pharynx: Oropharynx is clear. Posterior oropharyngeal erythema present.   Cardiovascular:      Rate and Rhythm: Normal rate and regular rhythm.      Heart sounds: Murmur heard.      No friction rub. No gallop.   Pulmonary:      Effort: Pulmonary effort is normal.      Breath sounds: Normal breath sounds. No wheezing, rhonchi or rales.   Musculoskeletal:      Cervical back: Normal range of motion and neck supple.             REVIEWED DATA     Labs:    Internal Control Lot Number Expiration Date Rapid Strep A Screen   Latest Ref Rng Passed    Negative, VALID, INVALID, Not Performed    6/27/2024 Passed  [id number]  2025-01-01  Negative              Imaging:           Medical Tests:           Summary of old records / correspondence / consultant report:           Request outside records:

## 2024-06-27 NOTE — ASSESSMENT & PLAN NOTE
Symptom Relief for Viral Illnesses       1. DIAGNOSIS  2. GENERAL INSTRUCTIONS   Viral sore throat    You have been diagnosed with an illness caused by a virus.  Antibiotics do not work on viruses.  When antibiotics aren't needed, they won't help you, and the side effects could still hurt you.  The treatments prescribed below will help you feel better while your body fights off the virus.   Drink extra water and fluids  Use a cool mist vaporizer or saline nasal spray to relieve congestion  For sore throats use ice chips, sore throat spray, or lozenges  Use honey to relieve cough.  Do not give honey to an infant younger than 1 year.      3. SPECIFIC MEDICINES  4. FOLLOW UP   Use over-the-counter medications specific to your symptoms. Use medicines according to the package instructions or as directed by your healthcare professional.  Stop the medication when the symptoms get better.   If not improved in 3-5 days, if new symptoms occur, or if you have other concerns, please call or return to the office for a recheck.         To learn more about antibiotic prescribing and use, visit www.cdc.gov/antibiotic-use

## 2024-11-11 RX ORDER — AMITRIPTYLINE HYDROCHLORIDE 10 MG/1
10 TABLET ORAL
Qty: 90 TABLET | Refills: 1 | OUTPATIENT
Start: 2024-11-11

## 2024-11-11 RX ORDER — AMITRIPTYLINE HYDROCHLORIDE 10 MG/1
10 TABLET ORAL
Qty: 90 TABLET | Refills: 0 | Status: SHIPPED | OUTPATIENT
Start: 2024-11-11

## 2024-11-11 NOTE — TELEPHONE ENCOUNTER
Caller: Jose Wadsworth    Relationship: Self    Best call back number: [phone]    Requested Prescriptions:   Requested Prescriptions     Pending Prescriptions Disp Refills    amitriptyline (ELAVIL) 10 MG tablet 90 tablet 1     Sig: Take 1 tablet by mouth every night at bedtime.        Pharmacy where request should be sent:      Last office visit with prescribing clinician: 12/21/2022   Last telemedicine visit with prescribing clinician: Visit date not found   Next office visit with prescribing clinician: Visit date not found 1/20/25 WITH BHAVANA FERNANDEZ    Additional details provided by patient:     Does the patient have less than a 3 day supply:  [] Yes  [x] No      If the office needs to give you a call back, can they leave a voicemail: [x] Yes [] No    Shannan Cevallos Rep   11/11/24 08:25 EST

## 2025-01-23 ENCOUNTER — OFFICE VISIT (OUTPATIENT)
Dept: INTERNAL MEDICINE | Age: 35
End: 2025-01-23
Payer: COMMERCIAL

## 2025-01-23 VITALS
HEART RATE: 81 BPM | HEIGHT: 67 IN | OXYGEN SATURATION: 99 % | SYSTOLIC BLOOD PRESSURE: 100 MMHG | BODY MASS INDEX: 18.52 KG/M2 | DIASTOLIC BLOOD PRESSURE: 60 MMHG | WEIGHT: 118 LBS | TEMPERATURE: 96.9 F

## 2025-01-23 DIAGNOSIS — S76.301A RIGHT HAMSTRING INJURY, INITIAL ENCOUNTER: Primary | ICD-10-CM

## 2025-01-23 PROCEDURE — 99213 OFFICE O/P EST LOW 20 MIN: CPT

## 2025-01-23 RX ORDER — CHLORAL HYDRATE 500 MG
CAPSULE ORAL
COMMUNITY

## 2025-01-23 NOTE — PROGRESS NOTES
"    I N T E R N A L  M E D I C I N E  Shelby Rodriguez, APRN    ENCOUNTER DATE:  01/23/2025    Jose Wadsworth / 34 y.o. / female      CHIEF COMPLAINT / REASON FOR OFFICE VISIT     Leg Pain (PT states she was doing a front splits and strained her right hamstring.)      ASSESSMENT & PLAN     Diagnoses and all orders for this visit:    1. Right hamstring injury, initial encounter (Primary)  -     Ambulatory Referral to Physical Therapy for Evaluation & Treatment         SUMMARY/DISCUSSION  Recommend PT evaluation/ treatment for right hamstring strain along with quad weakness.  Consider topical Voltaren PRN.  Tentatively thinking about planning to conceive later this year with .  She will start a prenatal vitamin.  Will discuss her medication list with pharmacist to assess whether any medication should be discontinued prior to conception.      Next Appointment with me: 3/10/2025    Return for Next scheduled follow up.      VITAL SIGNS     Visit Vitals  /60   Pulse 81   Temp 96.9 °F (36.1 °C)   Ht 170.2 cm (67\")   Wt 53.5 kg (118 lb)   SpO2 99%   BMI 18.48 kg/m²             Wt Readings from Last 3 Encounters:   01/23/25 53.5 kg (118 lb)   05/14/24 51.4 kg (113 lb 6.4 oz)   05/03/24 52 kg (114 lb 9.6 oz)     Body mass index is 18.48 kg/m².        MEDICATIONS AT THE TIME OF OFFICE VISIT     Current Outpatient Medications on File Prior to Visit   Medication Sig Dispense Refill    albuterol (PROVENTIL HFA;VENTOLIN HFA) 108 (90 Base) MCG/ACT inhaler Inhale 2 puffs.      Albuterol-Budesonide 90-80 MCG/ACT aerosol       amitriptyline (ELAVIL) 10 MG tablet Take 1 tablet by mouth every night at bedtime. 90 tablet 0    Breo Ellipta 100-25 MCG/ACT aerosol powder        Cetirizine HCl (ZYRTEC ALLERGY PO) Take by mouth.      cholecalciferol (VITAMIN D3) 25 MCG (1000 UT) tablet Take 1 tablet by mouth Daily.      [employer] B-12 1000 MCG tablet controlled-release Take 1 tablet by mouth Daily.  11    drospirenone-ethinyl " estradiol (ROB,GIANVI) 3-0.02 MG per tablet TAKE 1 TABLET BY MOUTH EVERY DAY      fluticasone (FLONASE) 50 MCG/ACT nasal spray       montelukast (SINGULAIR) 10 MG tablet Take 1 tablet by mouth Daily.  5    Omega-3 Fatty Acids (fish oil) 1000 MG capsule capsule Take  by mouth.      Probiotic Product (PROBIOTIC PO) Take  by mouth.      linaclotide (Linzess) 72 MCG capsule capsule Take 1 capsule by mouth Every Morning Before Breakfast. 90 capsule 3     No current facility-administered medications on file prior to visit.        HISTORY OF PRESENT ILLNESS     End of December 2024, performed splits with right leg in front while participating in dance class.  Some right hamstring pain initially which improved with icing, rest, stretching.  Then symptoms returned again one week ago.  At age 18, strained quad.  Did not complete PT at that time.  Has discomfort throughout right thigh.  Range of motion is limited.  Interested in PT.        Patient Care Team:  Shelby Rodriguez APRN as PCP - General (Family Medicine)  Christian Ulrich MD as Consulting Physician (Pulmonary Disease)  Marcia Nicholas MD as Consulting Physician (Obstetrics and Gynecology)  Cha Viveros MD as Consulting Physician (Gastroenterology)    REVIEW OF SYSTEMS     Review of Systems   Constitutional:  Negative for chills, fever and unexpected weight change.   Respiratory:  Negative for cough, chest tightness and shortness of breath.    Cardiovascular:  Negative for chest pain, palpitations and leg swelling.   Musculoskeletal:  Positive for myalgias (Right hamstring).   Neurological:  Negative for dizziness, weakness, light-headedness, numbness and headaches.   Psychiatric/Behavioral:  The patient is not nervous/anxious.           PHYSICAL EXAMINATION     Physical Exam  Vitals reviewed.   Constitutional:       General: She is not in acute distress.     Appearance: Normal appearance. She is not ill-appearing, toxic-appearing or diaphoretic.   HENT:       Head: Normocephalic and atraumatic.   Cardiovascular:      Rate and Rhythm: Normal rate and regular rhythm.      Heart sounds: Normal heart sounds.   Pulmonary:      Effort: Pulmonary effort is normal.      Breath sounds: Normal breath sounds.   Musculoskeletal:      Lumbar back: No tenderness or bony tenderness. Negative right straight leg raise test.      Right hip: Decreased range of motion. Decreased strength.      Right lower leg: No edema.      Left lower leg: No edema.   Neurological:      Mental Status: She is alert and oriented to person, place, and time. Mental status is at baseline.   Psychiatric:         Mood and Affect: Mood normal.         Behavior: Behavior normal.         Thought Content: Thought content normal.         Judgment: Judgment normal.           REVIEWED DATA     Labs:           Imaging:            Medical Tests:           Summary of old records / correspondence / consultant report:           Request outside records:

## 2025-01-30 ENCOUNTER — OFFICE VISIT (OUTPATIENT)
Dept: GASTROENTEROLOGY | Facility: CLINIC | Age: 35
End: 2025-01-30
Payer: COMMERCIAL

## 2025-01-30 VITALS
BODY MASS INDEX: 18.24 KG/M2 | SYSTOLIC BLOOD PRESSURE: 91 MMHG | DIASTOLIC BLOOD PRESSURE: 65 MMHG | HEART RATE: 79 BPM | HEIGHT: 67 IN | WEIGHT: 116.2 LBS

## 2025-01-30 DIAGNOSIS — K58.1 IRRITABLE BOWEL SYNDROME WITH CONSTIPATION: Primary | ICD-10-CM

## 2025-01-30 PROCEDURE — 99214 OFFICE O/P EST MOD 30 MIN: CPT

## 2025-01-30 RX ORDER — AMITRIPTYLINE HYDROCHLORIDE 10 MG/1
10 TABLET ORAL
Qty: 90 TABLET | Refills: 3 | Status: SHIPPED | OUTPATIENT
Start: 2025-01-30 | End: 2025-01-30

## 2025-01-30 RX ORDER — AMITRIPTYLINE HYDROCHLORIDE 10 MG/1
10 TABLET ORAL
Qty: 90 TABLET | Refills: 0 | Status: SHIPPED | OUTPATIENT
Start: 2025-01-30

## 2025-01-30 NOTE — PROGRESS NOTES
"Chief Complaint  Irritable bowel syndrome with constipation    Subjective          History of Present Illness    Jose Wadsworth is a  34 y.o. female presents for follow-up for IBS-C.  She is a patient of Dr. Viveros and is new to me.  She was last seen in the office 10/2023 by BHAVANA Davis.    At last office visit she was doing well on Linzess 72 once daily.  She has used amitriptyline in the past for abdominal discomfort associated w/ IBS-C.     Today she states she has been doing really well.  She no longer requires Linzess and has been having regular bowel movements.  No black or bloody stool.  She denies any further abdominal pain.  She was thinking of stopping amitriptyline last year but ended up continuing it as things were going well.  She denies any unintentional weight loss, abdominal pain, nausea or vomiting.  She reports that she and her  are planning on trying to conceive later this year and she is concerned about taking amitriptyline throughout this time.    Labs reviewed from May 2024 showed normal LFTs, normal kidney function, hemoglobin stable at 13.6 at that time.    12/14/2018 EGD showed white nummular lesions in esophageal mucosa, erythematous mucosa in the antrum, normal examined duodenum.    12/14/2018 colonoscopy showed normal entire examined colon.    Objective   Vital Signs:   BP 91/65 (BP Location: Right arm, Patient Position: Sitting, Cuff Size: Adult)   Pulse 79   Ht 170.2 cm (67\")   Wt 52.7 kg (116 lb 3.2 oz)   BMI 18.20 kg/m²       Physical Exam  Constitutional:       General: She is not in acute distress.     Appearance: Normal appearance.   Eyes:      General: No scleral icterus.  Pulmonary:      Effort: Pulmonary effort is normal.   Abdominal:      General: Abdomen is flat. There is no distension.      Tenderness: There is no abdominal tenderness. There is no guarding.   Skin:     Coloration: Skin is not jaundiced.   Neurological:      General: No focal deficit " "present.      Mental Status: She is alert and oriented to person, place, and time.   Psychiatric:         Mood and Affect: Mood normal.         Behavior: Behavior normal.          Result Review :   The following data was reviewed by: Sofia Leal PA-C on 01/30/2025:  CMP          3/8/2024    08:58 5/14/2024    09:56   CMP   Glucose 84  85    BUN 9  13    Creatinine 0.81  0.82    EGFR  97.0    Sodium 141  141    Potassium 4.1  4.0    Chloride 105  106    Calcium 8.9  9.3    Total Protein 6.4     Total Protein  6.6    Albumin 4.5  4.1    Globulin 1.9     Globulin  2.5    Total Bilirubin 0.5  0.3    Alkaline Phosphatase 64  68    AST (SGOT) 15  12    ALT (SGPT) 12  7    Albumin/Globulin Ratio  1.6    BUN/Creatinine Ratio 11.1  15.9    Anion Gap  10.3      CBC          3/8/2024    08:58 5/14/2024    09:56   CBC   WBC 6.05  6.27    RBC 4.17  4.22    Hemoglobin 13.8  13.6    Hematocrit 39.9  41.2    MCV 95.7  97.6    MCH 33.1  32.2    MCHC 34.6  33.0    RDW 12.3  12.2    Platelets 256  384              Assessment:   Diagnoses and all orders for this visit:    1. Irritable bowel syndrome with constipation (Primary)  -     Discontinue: amitriptyline (ELAVIL) 10 MG tablet; Take 1 tablet by mouth every night at bedtime.  Dispense: 90 tablet; Refill: 3  -     amitriptyline (ELAVIL) 10 MG tablet; Take 1 tablet by mouth every night at bedtime.  Dispense: 90 tablet; Refill: 0          Plan:   -She has been doing really well over the last year.  Concerned about taking amitriptyline while trying to conceive. We reviewed current recommendations on amitriptyline during pregnancy and prior to conception. Per UpToDate it does cross the human placenta and can be associated w/ increased risk of developing gestational diabetes. It clarifies that \"Data collection to monitor pregnancy and infant outcomes following exposure to antidepressant medications is ongoing\". Advised her to try to taper off of this medication now and see how she " does since her symptoms have been stable for  over a year to the point where she no longer requires Linzess. Would advise taking Amitriptyline every other day for 1-2 weeks and then stop completely.   -She reports she has discussed w/ primary provider who will be refilling this medication going forward so I will send one 90 day refill in the meantime.       Follow Up   Return if symptoms worsen or fail to improve.    Dragon dictation used throughout this note.         Sofia Leal PA-C  [facility], [place], operated by [facility]  [address] [place] [address]  Office: [phone]

## 2025-03-10 ENCOUNTER — OFFICE VISIT (OUTPATIENT)
Dept: INTERNAL MEDICINE | Age: 35
End: 2025-03-10
Payer: COMMERCIAL

## 2025-03-10 VITALS
TEMPERATURE: 98.6 F | BODY MASS INDEX: 18.58 KG/M2 | OXYGEN SATURATION: 100 % | WEIGHT: 118.4 LBS | DIASTOLIC BLOOD PRESSURE: 62 MMHG | SYSTOLIC BLOOD PRESSURE: 100 MMHG | HEART RATE: 84 BPM | HEIGHT: 67 IN

## 2025-03-10 DIAGNOSIS — E04.9 THYROID ENLARGEMENT: Primary | ICD-10-CM

## 2025-03-10 DIAGNOSIS — Z00.00 ANNUAL PHYSICAL EXAM: Primary | ICD-10-CM

## 2025-03-10 DIAGNOSIS — E04.9 THYROID ENLARGEMENT: ICD-10-CM

## 2025-03-10 LAB
ALBUMIN SERPL-MCNC: 4.2 G/DL (ref 3.5–5.2)
ALBUMIN/GLOB SERPL: 1.9 G/DL
ALP SERPL-CCNC: 61 U/L (ref 39–117)
ALT SERPL-CCNC: 12 U/L (ref 1–33)
APPEARANCE UR: CLEAR
AST SERPL-CCNC: 16 U/L (ref 1–32)
BASOPHILS # BLD AUTO: 0.08 10*3/MM3 (ref 0–0.2)
BASOPHILS NFR BLD AUTO: 1.3 % (ref 0–1.5)
BILIRUB SERPL-MCNC: 0.4 MG/DL (ref 0–1.2)
BILIRUB UR QL STRIP: NEGATIVE
BUN SERPL-MCNC: 9 MG/DL (ref 6–20)
BUN/CREAT SERPL: 11.5 (ref 7–25)
CALCIUM SERPL-MCNC: 9.1 MG/DL (ref 8.6–10.5)
CHLORIDE SERPL-SCNC: 105 MMOL/L (ref 98–107)
CHOLEST SERPL-MCNC: 167 MG/DL (ref 0–200)
CHOLEST/HDLC SERPL: 1.72 {RATIO}
CO2 SERPL-SCNC: 22.8 MMOL/L (ref 22–29)
COLOR UR: YELLOW
CREAT SERPL-MCNC: 0.78 MG/DL (ref 0.57–1)
EGFRCR SERPLBLD CKD-EPI 2021: 102.4 ML/MIN/1.73
EOSINOPHIL # BLD AUTO: 0.14 10*3/MM3 (ref 0–0.4)
EOSINOPHIL NFR BLD AUTO: 2.3 % (ref 0.3–6.2)
ERYTHROCYTE [DISTWIDTH] IN BLOOD BY AUTOMATED COUNT: 12.4 % (ref 12.3–15.4)
GLOBULIN SER CALC-MCNC: 2.2 GM/DL
GLUCOSE SERPL-MCNC: 83 MG/DL (ref 65–99)
GLUCOSE UR QL STRIP: NEGATIVE
HBA1C MFR BLD: 5.4 % (ref 4.8–5.6)
HCT VFR BLD AUTO: 41.4 % (ref 34–46.6)
HDLC SERPL-MCNC: 97 MG/DL (ref 40–60)
HGB BLD-MCNC: 13.8 G/DL (ref 12–15.9)
HGB UR QL STRIP: NEGATIVE
IMM GRANULOCYTES # BLD AUTO: 0.01 10*3/MM3 (ref 0–0.05)
IMM GRANULOCYTES NFR BLD AUTO: 0.2 % (ref 0–0.5)
KETONES UR QL STRIP: NEGATIVE
LDLC SERPL CALC-MCNC: 55 MG/DL (ref 0–100)
LEUKOCYTE ESTERASE UR QL STRIP: NEGATIVE
LYMPHOCYTES # BLD AUTO: 2.87 10*3/MM3 (ref 0.7–3.1)
LYMPHOCYTES NFR BLD AUTO: 47 % (ref 19.6–45.3)
MCH RBC QN AUTO: 33.2 PG (ref 26.6–33)
MCHC RBC AUTO-ENTMCNC: 33.3 G/DL (ref 31.5–35.7)
MCV RBC AUTO: 99.5 FL (ref 79–97)
MONOCYTES # BLD AUTO: 0.5 10*3/MM3 (ref 0.1–0.9)
MONOCYTES NFR BLD AUTO: 8.2 % (ref 5–12)
NEUTROPHILS # BLD AUTO: 2.51 10*3/MM3 (ref 1.7–7)
NEUTROPHILS NFR BLD AUTO: 41 % (ref 42.7–76)
NITRITE UR QL STRIP: NEGATIVE
NRBC BLD AUTO-RTO: 0 /100 WBC (ref 0–0.2)
PH UR STRIP: 6.5 [PH] (ref 5–8)
PLATELET # BLD AUTO: 273 10*3/MM3 (ref 140–450)
POTASSIUM SERPL-SCNC: 4.3 MMOL/L (ref 3.5–5.2)
PROT SERPL-MCNC: 6.4 G/DL (ref 6–8.5)
PROT UR QL STRIP: NEGATIVE
RBC # BLD AUTO: 4.16 10*6/MM3 (ref 3.77–5.28)
SODIUM SERPL-SCNC: 142 MMOL/L (ref 136–145)
SP GR UR STRIP: 1.01 (ref 1–1.03)
T4 FREE SERPL-MCNC: 1.22 NG/DL (ref 0.92–1.68)
TRIGL SERPL-MCNC: 81 MG/DL (ref 0–150)
TSH SERPL DL<=0.005 MIU/L-ACNC: 5.14 UIU/ML (ref 0.27–4.2)
UROBILINOGEN UR STRIP-MCNC: NORMAL MG/DL
VLDLC SERPL CALC-MCNC: 15 MG/DL (ref 5–40)
WBC # BLD AUTO: 6.11 10*3/MM3 (ref 3.4–10.8)

## 2025-03-10 PROCEDURE — 99395 PREV VISIT EST AGE 18-39: CPT

## 2025-03-10 NOTE — PROGRESS NOTES
"    I N T E R N A L  M E D I C I N E  Shelby Rodriguez, APRN      ENCOUNTER DATE:  03/10/2025    Jose Wadsworth / 34 y.o. / female      CHIEF COMPLAINT     Annual Exam    Followed yearly by pulmonology, Dr. Ulrich for asthma: Asthma is well controlled on albuterol inhaler PRN, Breo Ellipta inhaler daily, Singulair daily.  Saw pulmonology, Dr. Ulrich, on July 23, 2024.  Plans for yearly Chest XR for follow up.     Allergic rhinitis: Well controlled on Zyrtec, Flonase.  She is thinking about scheduling with an allergist to discuss possible immunotherapy - she will let me know if referral is needed.       Followed by OBGYN, Dr. Marcia Nicholas at [place]: Plans to stop oral contraceptives this weekend with plan of attempting pregnancy.  Up to date on pap.     Followed by GI, Dr. Viveros for IBS-constipation: Has stopped Linzess and amitriptyline and symptoms remain stable.    VITALS     Visit Vitals  /62   Pulse 84   Temp 98.6 °F (37 °C)   Ht 170.2 cm (67\")   Wt 53.7 kg (118 lb 6.4 oz)   SpO2 100%   BMI 18.54 kg/m²       BP Readings from Last 3 Encounters:   03/10/25 100/62   01/30/25 91/65   01/23/25 100/60     Wt Readings from Last 3 Encounters:   03/10/25 53.7 kg (118 lb 6.4 oz)   01/30/25 52.7 kg (116 lb 3.2 oz)   01/23/25 53.5 kg (118 lb)      Body mass index is 18.54 kg/m².       MEDICATIONS     Current Outpatient Medications on File Prior to Visit   Medication Sig Dispense Refill    albuterol (PROVENTIL HFA;VENTOLIN HFA) 108 (90 Base) MCG/ACT inhaler Inhale 2 puffs.      Breo Ellipta 100-25 MCG/ACT aerosol powder        Cetirizine HCl (ZYRTEC ALLERGY PO) Take by mouth.      drospirenone-ethinyl estradiol (ROB,GIANVI) 3-0.02 MG per tablet TAKE 1 TABLET BY MOUTH EVERY DAY      fluticasone (FLONASE) 50 MCG/ACT nasal spray       montelukast (SINGULAIR) 10 MG tablet Take 1 tablet by mouth Daily.  5    Omega-3 Fatty Acids (fish oil) 1000 MG capsule capsule Take  by mouth.      PRENATAL VIT W/[id number] PO " Take 600 mcg by mouth Daily.      Probiotic Product (PROBIOTIC PO) Take  by mouth.      [DISCONTINUED] Albuterol-Budesonide 90-80 MCG/ACT aerosol  (Patient not taking: Reported on 3/10/2025)      [DISCONTINUED] amitriptyline (ELAVIL) 10 MG tablet Take 1 tablet by mouth every night at bedtime. (Patient not taking: Reported on 3/10/2025) 90 tablet 0    [DISCONTINUED] cholecalciferol (VITAMIN D3) 25 MCG (1000 UT) tablet Take 1 tablet by mouth Daily. (Patient not taking: Reported on 3/10/2025)      [DISCONTINUED] [facility] B-12 1000 MCG tablet controlled-release Take 1 tablet by mouth Daily. (Patient not taking: Reported on 3/10/2025)  11    [DISCONTINUED] linaclotide (Linzess) 72 MCG capsule capsule Take 1 capsule by mouth Every Morning Before Breakfast. (Patient not taking: Reported on 3/10/2025) 90 capsule 3     No current facility-administered medications on file prior to visit.         HISTORY OF PRESENT ILLNESS     Jose presents for annual health maintenance visit.    General health: good  Lifestyle:  Attempting to lose weight?: No   Diet: eats a well balanced, healthy diet  Exercise: exercises 3 days weekly, dancing, PT exercises  Tobacco: Never used   Alcohol: 2 days/week and 2 drinks/occasion, wine  Work: Full-time  Reproductive health:  Sexually active?: Yes   Sexual problems?: No problems  Concern for STD?: No    Sees Gynecologist?: Yes   Rama/Postmenopausal?: No   Domestic abuse concerns: No   Depression Screening:          3/10/2025     8:06 AM   PHQ-2/PHQ-9 Depression Screening   Little interest or pleasure in doing things Not at all   Feeling down, depressed, or hopeless Not at all   How difficult have these problems made it for you to do your work, take care of things at home, or get along with other people? Not difficult at all         PHQ-2: 0 (Not depressed)   PHQ-9: 0 (Negative screening for depression)    Patient Care Team:  Shelby Rodriguez APRN as PCP - General (Family Medicine)  Christian Ulrich,  MD as Consulting Physician (Pulmonary Disease)  Marcia Nicholas MD as Consulting Physician (Obstetrics and Gynecology)  Cha Viveros MD as Consulting Physician (Gastroenterology)      ALLERGIES  Allergies   Allergen Reactions    Clindamycin Phosphate GI Intolerance    Metronidazole GI Intolerance    Neomycin Rash    Nickel Rash    Other Rash     DISPERSE DYES        PFSH:     The following portions of the patient's history were reviewed and updated as appropriate: Allergies / Current Medications / Past Medical History / Surgical History / Social History / Family History    PROBLEM LIST   Patient Active Problem List   Diagnosis    Palpitations    ECG abnormal    Precordial pain    Moderate persistent asthma without complication    Irritable bowel syndrome with constipation    Anxiety and depression    Allergic rhinitis    Subclinical hypothyroidism    Acute pharyngitis       PAST MEDICAL HISTORY  Past Medical History:   Diagnosis Date    Allergic rhinitis     Asthma     Chronic constipation     Heart murmur     Mild    Irritable bowel syndrome 2013    Pneumonia     Subclinical hypothyroidism     Upper respiratory infection     Vitamin B 12 deficiency     Vitamin D deficiency        SURGICAL HISTORY  Past Surgical History:   Procedure Laterality Date    APPENDECTOMY N/A 02/05/2016    Dr. Duong Johnson    CERVICAL SPINE ANTERIOR      COLONOSCOPY  12/14/2018    Normal    DENTAL PROCEDURE      x 2    ENDOSCOPY  12/14/2018    Yeast in esophagus    EYE SURGERY  2016    Lasik    LASIK      LEEP      LEEP PROCEDURE    SPINE SURGERY  3/24/22    [facility] at [facility]    UPPER GASTROINTESTINAL ENDOSCOPY  12/14/18       SOCIAL HISTORY  Social History     Socioeconomic History    Marital status:    Tobacco Use    Smoking status: Never    Smokeless tobacco: Never   Vaping Use    Vaping status: Never Used   Substance and Sexual Activity    Alcohol use: Yes     Alcohol/week: 4.0 standard drinks of alcohol     Types: 2  Glasses of wine, 1 Cans of beer, 1 Drinks containing 0.5 oz of alcohol per week     Comment: WEEKENDS AND HOLIDAYS    Drug use: No    Sexual activity: Yes     Partners: Male     Birth control/protection: Birth control pill       FAMILY HISTORY  Family History   Problem Relation Age of Onset    Gallbladder disease Mother     Irritable bowel syndrome Mother     Hyperlipidemia Mother     Hypertension Mother     Hypothyroidism Mother     Thyroid disease Mother         Hypothyroidism    Hypothyroidism Father     Hypertension Father     Crohn's disease Father     Cancer Father         Skin    Hyperlipidemia Father     Thyroid disease Father         Hypothyroidism    Heart attack Maternal Grandfather     Stroke Paternal Grandmother     Diabetes Maternal Uncle     Cancer Maternal Uncle         Thyroid       IMMUNIZATION HISTORY  Immunization History   Administered Date(s) Administered    COVID-19 (MODERNA) Monovalent Original Booster 11/20/2021    COVID-19 (PFIZER) 12YRS+ (COMIRNATY) 11/16/2023, 09/02/2024    COVID-19 (PFIZER) BIVALENT 12+YRS 09/10/2022    COVID-19 (PFIZER) Purple Cap Monovalent 02/28/2021, 03/29/2021    Fluzone (or Fluarix & Flulaval for VFC) >6mos 10/23/2019, 09/13/2020, 09/25/2021    Fluzone Quad >6mos (Multi-dose) 12/10/2018    Hep A, Unspecified 05/04/2010    Influenza Inj MDCK Preserative Free 09/02/2024    Influenza Injectable Mdck Pf Quad 09/10/2022, 11/16/2023    Influenza, Unspecified 12/07/2018, 09/13/2020, 09/10/2022    Pneumococcal Conjugate 20-Valent (PCV20) 03/06/2023    Tdap 03/06/2023    Typhoid Inactivated 05/04/2010         REVIEW OF SYSTEMS     Review of Systems   Constitutional:  Negative for chills, fever and unexpected weight change.   Respiratory:  Negative for cough, chest tightness and shortness of breath.    Cardiovascular:  Negative for chest pain, palpitations and leg swelling.   Neurological:  Negative for dizziness, weakness, light-headedness and headaches.    Psychiatric/Behavioral:  The patient is not nervous/anxious.          PHYSICAL EXAMINATION     Physical Exam  Vitals reviewed.   Constitutional:       General: She is not in acute distress.     Appearance: Normal appearance. She is not ill-appearing, toxic-appearing or diaphoretic.   HENT:      Head: Normocephalic and atraumatic.      Right Ear: Tympanic membrane, ear canal and external ear normal. There is no impacted cerumen.      Left Ear: Tympanic membrane, ear canal and external ear normal. There is no impacted cerumen.      Nose: Nose normal. No congestion or rhinorrhea.      Mouth/Throat:      Mouth: Mucous membranes are moist.      Pharynx: Oropharynx is clear. No oropharyngeal exudate or posterior oropharyngeal erythema.   Eyes:      Extraocular Movements: Extraocular movements intact.      Conjunctiva/sclera: Conjunctivae normal.      Pupils: Pupils are equal, round, and reactive to light.   Neck:      Thyroid: Thyromegaly present. No thyroid tenderness.   Cardiovascular:      Rate and Rhythm: Normal rate and regular rhythm.      Heart sounds: Normal heart sounds.   Pulmonary:      Effort: Pulmonary effort is normal. No respiratory distress.      Breath sounds: Normal breath sounds.   Abdominal:      General: Bowel sounds are normal.      Palpations: Abdomen is soft.      Tenderness: There is no abdominal tenderness.   Musculoskeletal:         General: Normal range of motion.      Cervical back: Normal range of motion and neck supple.      Right lower leg: No edema.      Left lower leg: No edema.   Lymphadenopathy:      Cervical: No cervical adenopathy.   Skin:     General: Skin is warm and dry.   Neurological:      General: No focal deficit present.      Mental Status: She is alert and oriented to person, place, and time. Mental status is at baseline.   Psychiatric:         Mood and Affect: Mood normal.         Behavior: Behavior normal.         Thought Content: Thought content normal.         Judgment:  Judgment normal.         REVIEWED DATA      Labs:    Lab Results   Component Value Date     05/14/2024    K 4.0 05/14/2024    CALCIUM 9.3 05/14/2024    AST 12 05/14/2024    ALT 7 05/14/2024    BUN 13 05/14/2024    CREATININE 0.82 05/14/2024    CREATININE 0.81 03/08/2024    CREATININE 0.65 10/11/2023    EGFRIFNONA 91 07/23/2018    EGFRIFAFRI >60 03/15/2022       Lab Results   Component Value Date    GLUCOSE 85 05/14/2024    HGBA1C 5.20 03/08/2024    HGBA1C 5.20 03/06/2023    TSH 3.420 03/08/2024    FREET4 1.23 03/08/2024       Lab Results   Component Value Date    LDL 46 03/08/2024    HDL 96 (H) 03/08/2024    TRIG 79 03/08/2024    CHOLHDLRATIO 1.64 03/08/2024       Lab Results   Component Value Date    KJHY60CZ 75.3 03/06/2023        Lab Results   Component Value Date    WBC 6.27 05/14/2024    HGB 13.6 05/14/2024    MCV 97.6 (H) 05/14/2024     05/14/2024       Lab Results   Component Value Date    PROTEIN Negative 03/08/2024    GLUCOSEU Negative 03/08/2024    BLOODU Negative 03/08/2024    NITRITEU Negative 03/08/2024    LEUKOCYTESUR Negative 03/08/2024          Lab Results   Component Value Date    HEPCVIRUSABY Nonreactive 02/12/2024       Imaging:        Medical Tests:        ASSESSMENT & PLAN     ANNUAL WELLNESS EXAM / PHYSICAL     Diagnoses and all orders for this visit:    1. Annual physical exam (Primary)  -     CBC & Differential  -     Comprehensive Metabolic Panel  -     Hemoglobin A1c  -     Lipid Panel With / Chol / HDL Ratio  -     TSH+Free T4  -     Urinalysis With Microscopic If Indicated (No Culture) - Urine, Clean Catch    2. Thyroid enlargement  -     US Thyroid         Summary/Discussion:     Incidental finding of mild thyromegaly on today's exam.  She is asymptomatic without dysphagia, neck pain.  Family history of hypothyroidism in mom and dad.  Agreeable for US investigation.      Next Appointment with me: Visit date not found    Return in about 1 year (around 3/10/2026) for Annual  physical.      HEALTHCARE MAINTENANCE ISSUES     Cancer Screening:  Colon: Initial/Next screening at age: 45  Repeat colon cancer screening: N/A at this time  Breast: Recommended monthly self exams; annual professional exam  Mammogram: Start at age 40  Cervical:  Followed by OBGYN  Skin: Monthly self skin examination, annual exam by health professional  Lung: Does not meet criteria for lung cancer screening.   Other:    Screening Labs & Tests:  Lab results reviewed & discussed with the patient or test orders placed today.  EKG:  CV Screening: Lipid panel  DEXA (65+ or postmenopausal with risk factors):   HEP C (If born [id number], or risk factors): Previously had negative screen  Other:     Immunization/Vaccinations (to be given today unless deferred by patient)  Influenza: Patient had the flu shot this season  Hepatitis A: Verify immunization records  Hepatitis B: Verify immunization records  Tetanus/Pertussis: Up to date  Pneumovax: Up to date  Shingles: Not needed at this time  COVID: Already had the latest booster     Lifestyle Counseling:  Lifestyle Modifications: Continue good lifestyle choices/modifications and Reduce exposure to stress if possible  Safety Issues: Always wear seatbelt, Avoid texting while driving   Use sunscreen, regular skin examination  Recommended annual dental/vision examination.  Emotional/Stress/Sleep: Reviewed and  given when appropriate      Health Maintenance   Topic Date Due    PAP SMEAR  05/02/2025 (Originally 5/29/2023)    ANNUAL PHYSICAL  03/10/2026    TDAP/TD VACCINES (2 - Td or Tdap) 03/06/2033    HEPATITIS C SCREENING  Completed    COVID-19 Vaccine  Completed    Pneumococcal Vaccine 0-49  Completed    INFLUENZA VACCINE  Completed

## 2025-03-28 ENCOUNTER — HOSPITAL ENCOUNTER (OUTPATIENT)
Dept: ULTRASOUND IMAGING | Facility: HOSPITAL | Age: 35
Discharge: HOME | End: 2025-03-28
Payer: COMMERCIAL

## 2025-03-28 DIAGNOSIS — E04.9 THYROID ENLARGEMENT: ICD-10-CM

## 2025-03-28 PROCEDURE — 76536 US EXAM OF HEAD AND NECK: CPT

## 2025-04-08 DIAGNOSIS — R79.89 ABNORMAL THYROID BLOOD TEST: Primary | ICD-10-CM

## 2025-04-08 DIAGNOSIS — D75.89 MACROCYTOSIS: ICD-10-CM

## 2025-04-22 ENCOUNTER — LAB (OUTPATIENT)
Facility: HOSPITAL | Age: 35
End: 2025-04-22
Payer: COMMERCIAL

## 2025-04-22 PROCEDURE — 84443 ASSAY THYROID STIM HORMONE: CPT

## 2025-04-22 PROCEDURE — 84439 ASSAY OF FREE THYROXINE: CPT

## 2025-04-22 PROCEDURE — 82607 VITAMIN B-12: CPT

## 2025-04-24 DIAGNOSIS — E03.9 HYPOTHYROIDISM, UNSPECIFIED TYPE: Primary | ICD-10-CM

## 2025-04-24 DIAGNOSIS — D75.89 MACROCYTOSIS: ICD-10-CM

## 2025-04-24 RX ORDER — LEVOTHYROXINE SODIUM 25 UG/1
25 TABLET ORAL
Qty: 90 TABLET | Refills: 0 | Status: SHIPPED | OUTPATIENT
Start: 2025-04-24

## 2025-05-08 NOTE — PROGRESS NOTES
"    I N T E R N A L  M E D I C I N E  Shelby Rodriguez, APRN    ENCOUNTER DATE:  05/09/2025    Jose Wadsworth / 34 y.o. / female      CHIEF COMPLAINT / REASON FOR OFFICE VISIT     Joint Pain      ASSESSMENT & PLAN     Diagnoses and all orders for this visit:    1. Right hip pain (Primary)  -     Ambulatory Referral to Orthopedic Surgery    2. Acute pain of both knees  -     Ambulatory Referral to Orthopedic Surgery         SUMMARY/DISCUSSION  Recommend ortho referral for persistent right hip pain, with ultrasound showing possible labral degeneration.  She defers XR imaging at this time.  At this time, she is not pregnant and is agreeable to start taking Meloxicam as prescribed (she has received approval from GYN).    Also recommend evaluation of knee pain given her exam shows patella laxity.  Offered further diagnostic labs for possible autoimmune component to her joint pain/ hypermobility - declines at this time.  Will return for progressive, non improving symptoms.        Next Appointment with me: Visit date not found    Return for Next scheduled follow up.      VITAL SIGNS     Visit Vitals  /70   Pulse 92   Temp 97.8 °F (36.6 °C)   Resp 18   Ht 170.2 cm (67.01\")   Wt 53.5 kg (118 lb)   SpO2 99%   BMI 18.48 kg/m²             Wt Readings from Last 3 Encounters:   05/09/25 53.5 kg (118 lb)   03/10/25 53.7 kg (118 lb 6.4 oz)   01/30/25 52.7 kg (116 lb 3.2 oz)     Body mass index is 18.48 kg/m².        MEDICATIONS AT THE TIME OF OFFICE VISIT     Current Outpatient Medications on File Prior to Visit   Medication Sig Dispense Refill    albuterol (PROVENTIL HFA;VENTOLIN HFA) 108 (90 Base) MCG/ACT inhaler Inhale 2 puffs.      Breo Ellipta 100-25 MCG/ACT aerosol powder        Cetirizine HCl (ZYRTEC ALLERGY PO) Take by mouth.      fluticasone (FLONASE) 50 MCG/ACT nasal spray       levothyroxine (SYNTHROID, LEVOTHROID) 25 MCG tablet Take 1 tablet by mouth Every Morning. 90 tablet 0    meloxicam (MOBIC) 15 MG tablet " "Take 1 tablet by mouth Daily.      montelukast (SINGULAIR) 10 MG tablet Take 1 tablet by mouth Daily.  5    Omega-3 Fatty Acids (fish oil) 1000 MG capsule capsule Take  by mouth.      Probiotic Product (PROBIOTIC PO) Take  by mouth.      drospirenone-ethinyl estradiol (ROB,GIANVI) 3-0.02 MG per tablet TAKE 1 TABLET BY MOUTH EVERY DAY (Patient not taking: Reported on 5/9/2025)      PRENATAL VIT W/[id number] PO Take 600 mcg by mouth Daily. (Patient not taking: Reported on 5/9/2025)       No current facility-administered medications on file prior to visit.        HISTORY OF PRESENT ILLNESS     In January 2025, referred to physical therapy for right hamstring injury.  Attended physical therapy early February through mid April.  Possible minor benefit.  Some increase in strength but continues with right buttock pain which radiates into right groin at times.  Physical therapy performed ultrasound - reportedly showing right hamstring tendinosis, gluteus minimus and medius tendinosis with greater trochanteric busal effusion, anterior labral degeneration.    In the last few weeks, notices pop and snapping sensation of bilateral knees.  No trauma or recent injury.  Has chronic \"snapping\" of bilateral shoulders, along with joint pain of fingers.  No redness, warmth, swelling of joints.  Increase in joint pain symptoms seems to coincide with stopping amitriptyline. No fever, chills, night sweats, unexpected weight loss.  No family history of auto immune diseases.  Mom with fibromyalgia.  Considered about possible hypermobility.    Saw telehealth practitioner who prescribed Meloxicam - she is not using.    Patient Care Team:  Shelby Rodriguez APRN as PCP - General (Family Medicine)  Christian Ulrich MD as Consulting Physician (Pulmonary Disease)  Marcia Nicholas MD as Consulting Physician (Obstetrics and Gynecology)  Cha Viveros MD as Consulting Physician (Gastroenterology)    REVIEW OF SYSTEMS     Review of " Systems   Constitutional:  Negative for chills, fever and unexpected weight change.   Respiratory:  Negative for cough, chest tightness and shortness of breath.    Cardiovascular:  Negative for chest pain, palpitations and leg swelling.   Musculoskeletal:  Positive for arthralgias.   Neurological:  Negative for dizziness, weakness, light-headedness and headaches.   Psychiatric/Behavioral:  The patient is not nervous/anxious.           PHYSICAL EXAMINATION     Physical Exam  Vitals reviewed.   Constitutional:       General: She is not in acute distress.     Appearance: Normal appearance. She is not ill-appearing, toxic-appearing or diaphoretic.   HENT:      Head: Normocephalic and atraumatic.   Cardiovascular:      Rate and Rhythm: Normal rate and regular rhythm.      Heart sounds: Normal heart sounds.   Pulmonary:      Effort: Pulmonary effort is normal.      Breath sounds: Normal breath sounds.   Musculoskeletal:      Right hip: Tenderness (Lateral) present. Decreased range of motion.      Right knee: Normal range of motion. No tenderness. Normal patellar mobility.      Left knee: Normal range of motion. No tenderness. Abnormal patellar mobility.      Comments: Beighton Scoring System with score of 3 (1 point for each arm, 1 point for spine).   Neurological:      Mental Status: She is alert and oriented to person, place, and time. Mental status is at baseline.   Psychiatric:         Mood and Affect: Mood normal.         Behavior: Behavior normal.         Thought Content: Thought content normal.         Judgment: Judgment normal.           REVIEWED DATA     Labs:           Imaging:            Medical Tests:           Summary of old records / correspondence / consultant report:           Request outside records:

## 2025-05-09 ENCOUNTER — OFFICE VISIT (OUTPATIENT)
Dept: INTERNAL MEDICINE | Age: 35
End: 2025-05-09
Payer: COMMERCIAL

## 2025-05-09 VITALS
DIASTOLIC BLOOD PRESSURE: 70 MMHG | BODY MASS INDEX: 18.52 KG/M2 | OXYGEN SATURATION: 99 % | RESPIRATION RATE: 18 BRPM | WEIGHT: 118 LBS | SYSTOLIC BLOOD PRESSURE: 110 MMHG | HEART RATE: 92 BPM | HEIGHT: 67 IN | TEMPERATURE: 97.8 F

## 2025-05-09 DIAGNOSIS — M25.562 ACUTE PAIN OF BOTH KNEES: ICD-10-CM

## 2025-05-09 DIAGNOSIS — M25.551 RIGHT HIP PAIN: Primary | ICD-10-CM

## 2025-05-09 DIAGNOSIS — M25.561 ACUTE PAIN OF BOTH KNEES: ICD-10-CM

## 2025-05-09 RX ORDER — MELOXICAM 15 MG/1
15 TABLET ORAL DAILY
COMMUNITY

## 2025-06-11 ENCOUNTER — OFFICE VISIT (OUTPATIENT)
Age: 35
End: 2025-06-11
Payer: COMMERCIAL

## 2025-06-11 ENCOUNTER — LAB (OUTPATIENT)
Facility: HOSPITAL | Age: 35
End: 2025-06-11
Payer: COMMERCIAL

## 2025-06-11 VITALS — TEMPERATURE: 96.2 F | BODY MASS INDEX: 18.21 KG/M2 | WEIGHT: 116 LBS | HEIGHT: 67 IN | RESPIRATION RATE: 18 BRPM

## 2025-06-11 DIAGNOSIS — R29.898 WEAKNESS OF BOTH HIPS: ICD-10-CM

## 2025-06-11 DIAGNOSIS — M25.561 BILATERAL ANTERIOR KNEE PAIN: ICD-10-CM

## 2025-06-11 DIAGNOSIS — S76.311A HAMSTRING STRAIN, RIGHT, INITIAL ENCOUNTER: Primary | ICD-10-CM

## 2025-06-11 DIAGNOSIS — M67.951 TENDINOPATHY OF RIGHT GLUTEUS MEDIUS: ICD-10-CM

## 2025-06-11 DIAGNOSIS — M25.562 BILATERAL ANTERIOR KNEE PAIN: ICD-10-CM

## 2025-06-11 PROCEDURE — 84443 ASSAY THYROID STIM HORMONE: CPT

## 2025-06-11 PROCEDURE — 84439 ASSAY OF FREE THYROXINE: CPT

## 2025-06-11 PROCEDURE — 99213 OFFICE O/P EST LOW 20 MIN: CPT | Performed by: STUDENT IN AN ORGANIZED HEALTH CARE EDUCATION/TRAINING PROGRAM

## 2025-06-11 PROCEDURE — 82746 ASSAY OF FOLIC ACID SERUM: CPT

## 2025-06-11 PROCEDURE — 82607 VITAMIN B-12: CPT

## 2025-06-11 NOTE — PROGRESS NOTES
Jose is a 34 y.o. year old female here today for consultation requested by Shelby Rodriguez APRN     Chief Complaint   Patient presents with    Left Knee - Pain, Initial Evaluation     Pt having pain in right hip and bilateral knee. No numbness tingling swelling or radiating pain. Knee pain started around April.     Right Knee - Pain, Initial Evaluation     Pt having pain in right hip and bilateral knee. No numbness tingling swelling or radiating pain. Knee pain started around April.    Left Foot - Pain, Initial Evaluation     Pain in left. No numbness tingling swelling or radiating pain.    Right Hip - Pain, Initial Evaluation     Hip pain started Dec. 2024.       History of Present Illness  Jose is a 34 y.o. year old female here today for bilateral knee and right hip pain that has been present since January when she thought she pulled a hamstring. Knee pain worsened in April which she attributes to the PT exercises.   History of Present Illness  The patient presents for evaluation of bilateral knee pain, right hip pain, and left foot pain.    She began experiencing bilateral knee pain in 04/2025, which she attributes to physical therapy exercises. The pain is not localized and varies in location. Initially, she experienced pain at the back of her knees, but it has since shifted to the front. She reports occasional popping sounds from her knees during leg extensions in physical therapy, but these are not associated with pain. She has experienced her legs giving way once or twice during exercise. She is uncertain about the presence of swelling in her knees. She continues to dance despite her symptoms, as she feels physically better while dancing. She has taken 2-week breaks from dancing on 2 or 3 occasions. She has noticed an increase in knee and hip pain with certain physical therapy exercises, particularly the bridge exercise. She has not undergone any x-rays. She reports no numbness or tingling down her legs.  She typically wears flat sandals or shoes with a slight heel.    She also reports left foot pain, which she first noticed in early 04/2025. She experienced intense pain in her left knee and foot in mid-April, but this has since improved.    She reports experiencing pain in both knees and hips, with the right hip being more affected than the left. Her initial injury involved the upper part of her right hamstring, near the top of her leg and bone, which improved rapidly and did not interfere with her dance activities. However, attempts to strengthen and immobilize the area may have been premature, leading to an exacerbation of the issue. By the time she sought physical therapy, she was experiencing pain along the hamstring, on the side of her hip, occasionally at the front of her hip, and sometimes on the side of her left hip. She is not experiencing much pain at the original injury site but has begun to experience severe pain again, which had subsided for a few months. She also reports pain on the side of her hip and into the groin. She experiences occasional popping in her hips, but this is not consistent. She reports no bruising but notes that the area appears swollen. She has been prescribed meloxicam, which initially provided relief but is no longer effective. She finds that movement alleviates her symptoms, while sitting exacerbates them. She occasionally experiences back pain but reports no numbness or tingling down her legs.    She works as a  and engages in dance and walking for exercise. She has been attempting to incorporate more strength training into her routine.    PAST SURGICAL HISTORY:  Right ankle sprain managed with a boot and physical therapy.  Right quad strain from kicking a soccer ball at age 18, for which she did not seek follow-up care for a year.    SOCIAL HISTORY  Occupations:   Exercise: Dance at least once a week, walking, strength training, Pilates         The  "following data was reviewed by: Betty Richardson DO on 06/11/2025:  Data reviewed : PCP note from 5/9/25 and 1/23/25       Temp 96.2 °F (35.7 °C) (Temporal)   Resp 18   Ht 170.2 cm (67\")   Wt 52.6 kg (116 lb)   BMI 18.17 kg/m²        Physical Exam  Vital signs reviewed.   General: Well developed, well nourished; No acute distress.  Eyes: conjunctiva clear; pupils equally round and reactive  ENT: external ears and nose atraumatic; hearing normal  CV: no peripheral edema, 2+ distal pulses  Resp: normal respiratory effort, no use of accessory muscles  Skin: normal color and pigmentation; no rashes or wounds; normal turgor  Psych: alert and oriented; mood and affect appropriate; recent and remote memory intact  Neuro: sensation to light touch intact    MSK Exam:  Physical Exam  Musculoskeletal:  Right Buttock: Tenderness  Left Buttock: Less Tender  Lateral Hip (Right): Tenderness  Lateral Hip (Left): Less Tender  Hamstring Origin: No Tenderness  Hips: Good Motion  Legs: Mild Weakness with Resistance  Right Knee: Mild Pain on Outside  Knees: Stable Ligaments        Assessment and Plan  Diagnoses and all orders for this visit:    1. Hamstring strain, right, initial encounter (Primary)  -     Ambulatory Referral to Physical Therapy for Evaluation & Treatment    2. Tendinopathy of right gluteus medius  -     Ambulatory Referral to Physical Therapy for Evaluation & Treatment    3. Bilateral anterior knee pain  -     Ambulatory Referral to Physical Therapy for Evaluation & Treatment    4. Weakness of both hips  -     Ambulatory Referral to Physical Therapy for Evaluation & Treatment       Assessment & Plan  1. Bilateral knee pain:    Physical therapy should be continued to improve muscle balance and strength. Avoid activities that exacerbate the pain. Aleve should be taken twice daily for 1 to 2 weeks. If Aleve is not effective, Voltaren gel can be used 3 to 4 times daily. If symptoms persist, x-rays may be " considered.    2. Right hip pain:    Continue physical therapy and avoid activities that exacerbate the pain. Use heat therapy before physical activity, such as a heating pad or long baths. If symptoms persist, an ultrasound or x-ray may be considered.    3. Left foot pain:    Continue physical therapy and avoid activities that exacerbate the pain. Use heat therapy before physical activity, such as a heating pad or long baths.    4. Hamstring tendinosis:    Continue physical therapy and avoid aggressive stretching. Use heat therapy before physical activity, such as a heating pad or long baths. If symptoms persist, alternative treatments such as PRP injections may be considered.    Follow-up  Follow up in 6 weeks.  All of her questions were answered and she is agreeable with the plan.    Dictated utilizing Dragon dictation.  Patient or patient representative verbalized consent for the use of Ambient Listening during the visit with  Betty Richardson DO for chart documentation. 6/11/2025  08:26 EDT

## 2025-07-01 ENCOUNTER — HOSPITAL ENCOUNTER (OUTPATIENT)
Dept: PHYSICAL THERAPY | Facility: HOSPITAL | Age: 35
Setting detail: THERAPIES SERIES
Discharge: HOME | End: 2025-07-01
Payer: COMMERCIAL

## 2025-07-01 DIAGNOSIS — M25.562 PAIN IN BOTH KNEES, UNSPECIFIED CHRONICITY: ICD-10-CM

## 2025-07-01 DIAGNOSIS — M25.561 PAIN IN BOTH KNEES, UNSPECIFIED CHRONICITY: ICD-10-CM

## 2025-07-01 DIAGNOSIS — S76.311A STRAIN OF RIGHT HAMSTRING MUSCLE, INITIAL ENCOUNTER: Primary | ICD-10-CM

## 2025-07-01 PROCEDURE — 97161 PT EVAL LOW COMPLEX 20 MIN: CPT

## 2025-07-01 NOTE — THERAPY EVALUATION
Outpatient Physical Therapy Ortho Initial Evaluation  [facility]     Patient Name: Jose Wadsworth  : 1990  MRN: [id number]  Today's Date: 2025      Visit Date: 2025    Patient Active Problem List   Diagnosis    Palpitations    ECG abnormal    Precordial pain    Moderate persistent asthma without complication    Irritable bowel syndrome with constipation    Anxiety and depression    Allergic rhinitis    Subclinical hypothyroidism    Acute pharyngitis        Past Medical History:   Diagnosis Date    Allergic     Allergic rhinitis     Ankle sprain     Right, resolved    Asthma     Bursitis of hip     Ultrasound from     Cervical disc disorder     [facility] 2022    Chronic constipation     Fracture, radius ,     Left - not sure if radius/ulna/both    Fracture, ulna ,     Left - not sure if radius/ulna/both    Heart murmur     Mild    Irritable bowel syndrome 2013    Pneumonia     Subclinical hypothyroidism     Upper respiratory infection     Vitamin B 12 deficiency     Vitamin D deficiency         Past Surgical History:   Procedure Laterality Date    APPENDECTOMY N/A 2016    Dr. Duong Johnson    CERVICAL SPINE ANTERIOR      COLONOSCOPY  2018    Normal    DENTAL PROCEDURE      x 2    ENDOSCOPY  2018    Yeast in esophagus    EYE SURGERY      Lasik    LASIK      LEEP      LEEP PROCEDURE    NECK SURGERY  2022    [facility]    SPINE SURGERY  3/24/22    [facility] at [facility]    UPPER GASTROINTESTINAL ENDOSCOPY  18       Visit Dx:     ICD-10-CM ICD-9-CM   1. Strain of right hamstring muscle, initial encounter  S76.311A 843.8   2. Pain in both knees, unspecified chronicity  M25.561 719.46    M25.562           Patient History       Row Name 25 0700 25 1115          History    Chief Complaint Pain  -MP Pain (P)    -patient     Type of Pain Ankle pain;Back pain;Foot pain;Hand pain;Hip pain;Knee pain  -MP Ankle pain;Back pain;Foot pain;Hand  "pain;Hip pain;Knee pain (P)    -patient     Date Current Problem(s) Began 12/27/24  -MP 12/27/24 (P)    -patient     Brief Description of Current Complaint Pt. Presents to clinic for evaluation of R hamstring strain/B hip pain, in addition to B knee and L foot/ankle pain. Reports B hip pain started in December 2024 and knee pain in April 2025. She reports pain onset after dancing and rotating forward while in splits and felt fairly immediate onset acute R hip pain/quad/hamstring pain that resolved quickly and continued to dance. She went to [facility] for PT in February-March as pain had improved but not resolved and was working on strengthening/lengthening exercises for her hamstring and as she continued with HEP she noted onset of knee. Prior HEP consisted of bridges which caused anterior knee pain, standing HS curls, seated LAQ, supine sciatic floss with knee flex/ext, SL RDL, standing abduction with TB, standing hip extension. She feels any improvement she had made in her hip/hamstring is now back to prior initial bout of PT in addition to knee pain. She feels the location of her pain is constantly changing between hips/knees/foot/ankle. She has not been dancing for the last several weeks due to the pain (last class 6/11) and orthopedic suggested she rest and only perform dances that are not painful. The joints feel \"stabbing\" in nature while muscle region is more aching. She denies any numbness/tingling. No particular aggravating factors aside from static sitting. Relieving factors include gentle movement, heat, voltaren.  -MP Acute injury in upper right hamstring area. I’m now experiencing bilateral pain in hips & knees and left ankle and foot (P)    -patient     Previous treatment for THIS PROBLEM --  PT  -MP --     Patient/Caregiver Goals Relieve pain;Return to prior level of function;Improve mobility;Improve strength;Know what to do to help the symptoms  -MP Relieve pain;Return to prior level of function;Improve " mobility;Improve strength;Know what to do to help the symptoms (P)    -patient     Hand Dominance right-handed  -MP right-handed (P)    -patient     Occupation/sports/leisure activities Dance - contemporary, jazz, hip hop, heels, basic strength training, walking, hiking;   -MP Dance, basic strength training, walking, hiking (P)    -patient     Patient seeing anyone else for problem(s)? orthopedic  -MP Referring physician Dr. Betty Richardson (P)    -patient     What clinical tests have you had for this problem? -- Other 1 (comment) (P)    -patient     Additional Clinical Tests Ultrasound of right hip and upper hamstring area  -MP Ultrasound of right hip and upper hamstring area (P)    -patient     Are you or can you be pregnant -- Other (comment) (P)    -patient        Pain     Pain Location Hip;Knee;Ankle  -MP --     Pain at Present 3  -MP --     Pain at Worst 7  -MP --     Pain Frequency Intermittent;Constant/continuous  -MP --     Pain Description Stabbing;Aching  stabbing in joint (hip and knee) aching in muscle  -MP --     What Performance Factors Make the Current Problem(s) WORSE? sitting > 60 minutes without movement  -MP --     What Performance Factors Make the Current Problem(s) BETTER? gentle movement/walking, dance, heat, voltaren  -MP --     Tolerance Time- Sitting 60 minutes  -MP --     Is your sleep disturbed? No  -MP --     Difficulties at work? has to rotate between L shaped desk  -MP --        Fall Risk Assessment    Any falls in the past year: No  -MP No (P)    -patient        Services    Prior Rehab/Home Health Experiences Yes  -MP Yes (P)    -patient     Are you currently receiving Home Health services No  -MP No (P)    -patient     Do you plan to receive Home Health services in the near future -- No (P)    -patient        Daily Activities    Primary Language English  -MP English (P)    -patient     Are you able to read Yes  -MP Yes (P)    -patient     Are you able to write Yes  -MP  Yes (P)    -patient     How does patient learn best? Listening;Demonstration  -MP Listening;Demonstration (P)    -patient     Does patient have problems with the following? None  -MP --     Barriers to learning None  -MP --     Pt Participated in POC and Goals Yes  -MP --        Safety    Are you being hurt, hit, or frightened by anyone at home or in your life? No  -MP No (P)    -patient     Are you being neglected by a caregiver No  -MP No (P)    -patient     Have you had any of the following issues with N/A  -MP N/A (P)    -patient               User Key  (r) = Recorded By, (t) = Taken By, (c) = Cosigned By      Initials Name Provider Type    MP Carla Gillespie, PT Physical Therapist    patient Jose Wadsworth --                     PT Ortho       Row Name 07/01/25 0800       Special Tests/Palpation    Special Tests/Palpation Hip  -MP       Hip Special Tests    FILIBERTO (hip vs SI pathology) Bilateral:;Positive  -MP    Ely’s test (rectus femoris tightness) Bilateral:;Negative  -MP    Hip scour test (labral vs hip pathology) Right:;Positive  -MP    FAIR test (piriformis syndrome) Bilateral:;Positive  -MP       General ROM    GENERAL ROM COMMENTS B hip adn knee ROM WFL  -MP       MMT (Manual Muscle Testing)    Rt Lower Ext Rt Hip Flexion;Rt Hip Extension;Rt Hip ABduction;Rt Knee Extension;Rt Knee Flexion;Rt Ankle Plantarflexion;Rt Ankle Dorsiflexion  -MP    Lt Lower Ext Lt Hip Flexion;Lt Hip Extension;Lt Hip ABduction;Lt Knee Extension;Lt Knee Flexion;Lt Ankle Plantarflexion;Lt Ankle Dorsiflexion  -MP       MMT Right Lower Ext    Rt Hip Flexion MMT, Gross Movement (5/5) normal  -MP    Rt Hip Extension MMT, Gross Movement (4/5) good  pain  -MP    Rt Hip ABduction MMT, Gross Movement (4+/5) good plus  -MP    Rt Knee Extension MMT, Gross Movement (5/5) normal  -MP    Rt Knee Flexion MMT, Gross Movement (5/5) normal  -MP    Rt Ankle Plantarflexion MMT, Gross Movement (5/5) normal  -MP    Rt Ankle Dorsiflexion MMT,  Gross Movement (5/5) normal  -MP       MMT Left Lower Ext    Lt Hip Flexion MMT, Gross Movement (5/5) normal  -MP    Lt Hip Extension MMT, Gross Movement (4+/5) good plus  -MP    Lt Hip ABduction MMT, Gross Movement (4+/5) good plus  -MP    Lt Knee Extension MMT, Gross Movement (5/5) normal  -MP    Lt Knee Flexion MMT, Gross Movement (5/5) normal  -MP    Lt Ankle Plantarflexion MMT, Gross Movement (5/5) normal  -MP    Lt Ankle Dorsiflexion MMT, Gross Movement (5/5) normal  -MP       Sensation    Sensation WNL? WNL  -MP       Flexibility    Flexibility Tested? Lower Extremity  -MP       Lower Extremity Flexibility    Hamstrings Bilateral:;Mildly limited  -MP              User Key  (r) = Recorded By, (t) = Taken By, (c) = Cosigned By      Initials Name Provider Type    Carla Tuttle, HEMANT Physical Therapist                                Therapy Education  Education Details: Educated on PT role and POC; discussed expectations/timeframe for healing.  Given: Symptoms/condition management  Program: New  How Provided: Verbal, Demonstration  Provided to: Patient  Level of Understanding: Teach back education performed, Verbalized, Demonstrated      PT OP Goals       Row Name 07/01/25 0800          PT Short Term Goals    STG Date to Achieve 07/31/25  -MP     STG 1 Pt. will be independent with initial HEP to improve self-management of condition.  -MP     STG 1 Progress New  -MP     STG 2 Pt. will return to 1 style of dance without increase in pain to allow progression back to all dance styles.  -MP     STG 2 Progress New  -MP        Long Term Goals    LTG Date to Achieve 08/30/25  -MP     LTG 1 Pt. will be independent with advanced HEP to improve long term management of condition and independence.  -MP     LTG 1 Progress New  -MP     LTG 2 Pt. will report 50% improvement in overall condition to improve QOL.  -MP     LTG 2 Progress New  -MP     LTG 3 Pt. will return to full dance activities without pain to allow return to  PLOF.  -MP     LTG 3 Progress New  -MP     LTG 4 Pt. will improve R hip extension strength >/= 4+/5 without pain to indicate improved hip strength.  -MP     LTG 4 Progress New  -MP     LTG 5 --  -MP     LTG 5 Progress --  -MP        Time Calculation    PT Goal Re-Cert Due Date 09/29/25  -MP               User Key  (r) = Recorded By, (t) = Taken By, (c) = Cosigned By      Initials Name Provider Type    Carla Tuttle, PT Physical Therapist                     PT Assessment/Plan       Row Name 07/01/25 0800          PT Assessment    Functional Limitations Impaired locomotion;Limitations in community activities;Performance in leisure activities;Performance in self-care ADL;Performance in work activities  -MP     Impairments Impaired flexibility;Posture;Poor body mechanics;Pain;Muscle strength;Locomotion;Range of motion  -MP     Assessment Comments Jose Wadsworth is a 34 y.o. year-old female referred to physical therapy for B hip, knee and L ankle/foot pain. She presents with a stable clinical presentation. She has comorbidities of history R quad injury and personal factors of sedentary job, hobbies including dance that may affect her progress in the plan of care. Self scored disability measure of KOS was a 84.29 (100 Is no disability). She demonstrated (+) B FILIBERTO, FAIR, (+) R hip scour, decreased B hip abduction/extension strength with pain on R hip extension MMT, mild decrease B hamstring flexibility. Signs and symptoms are consistent with referring diagnosis. She is appropriate for skilled therapy services at this time to address deficits and improve ease with ADLs and return to dance.  -MP     Please refer to paper survey for additional self-reported information No  -MP     Rehab Potential Good  -MP     Patient/caregiver participated in establishment of treatment plan and goals Yes  -MP     Patient would benefit from skilled therapy intervention Yes  -MP        PT Plan    PT Frequency 2x/week  -MP      Predicted Duration of Therapy Intervention (PT) 12 visits  -MP     Planned CPT's? PT EVAL LOW COMPLEXITY: 19007;PT RE-EVAL: 87075;PT THER PROC EA 15 MIN: 34396;PT THER ACT EA 15 MIN: 78932;PT MANUAL THERAPY EA 15 MIN: 85748;PT NEUROMUSC RE-EDUCATION EA 15 MIN: 29380;PT GAIT TRAINING EA 15 MIN: 96514;PT SELF CARE/HOME MGMT/TRAIN EA 15: 55161;PT HOT OR COLD PACK TREAT MCARE;PT ELECTRICAL STIM UNATTEND: ;PT ELECTRICAL STIM ATTD EA 15 MIN: 56432;PT ULTRASOUND EA 15 MIN: 56324;PT TRACTION LUMBAR: 35857  -MP     PT Plan Comments Prior HEP consisted of bridges which caused anterior knee pain, standing HS curls, seated LAQ, supine sciatic floss with knee flex/ext, SL RDL. Consider warm up rec bike, HS stretch, clamshells, side steps, hip abd iso, SLS w/ knee unlocked, bridge with LE over swiss ball, HS curl with swiss ball?  -MP               User Key  (r) = Recorded By, (t) = Taken By, (c) = Cosigned By      Initials Name Provider Type    Carla Tuttle, PT Physical Therapist                                                  Time Calculation:     Start Time: 0740 (arrival 739 for 730 appt)  Stop Time: 0815  Time Calculation (min): 35 min  Untimed Charges  PT Eval/Re-eval Minutes: 35  Total Minutes  Untimed Charges Total Minutes: 35   Total Minutes: 35     Therapy Charges for Today       Code Description Service Date Service Provider Modifiers Qty    [id number]  PT EVAL LOW COMPLEXITY 3 7/1/2025 Carla Gillespie, HEMANT GP 1                      Carla Gillespie PT  7/1/2025

## 2025-07-03 ENCOUNTER — HOSPITAL ENCOUNTER (OUTPATIENT)
Dept: PHYSICAL THERAPY | Facility: HOSPITAL | Age: 35
Setting detail: THERAPIES SERIES
Discharge: HOME | End: 2025-07-03
Payer: COMMERCIAL

## 2025-07-03 DIAGNOSIS — M25.561 PAIN IN BOTH KNEES, UNSPECIFIED CHRONICITY: ICD-10-CM

## 2025-07-03 DIAGNOSIS — S76.311A STRAIN OF RIGHT HAMSTRING MUSCLE, INITIAL ENCOUNTER: Primary | ICD-10-CM

## 2025-07-03 DIAGNOSIS — M25.562 PAIN IN BOTH KNEES, UNSPECIFIED CHRONICITY: ICD-10-CM

## 2025-07-03 PROCEDURE — 97110 THERAPEUTIC EXERCISES: CPT

## 2025-07-03 PROCEDURE — 97535 SELF CARE MNGMENT TRAINING: CPT

## 2025-07-03 NOTE — THERAPY TREATMENT NOTE
Outpatient Physical Therapy Ortho Treatment Note  [facility]     Patient Name: Jose Wadsworth  : 1990  MRN: [id number]  Today's Date: 7/3/2025      Visit Date: 2025    Visit Dx:    ICD-10-CM ICD-9-CM   1. Strain of right hamstring muscle, initial encounter  S76.311A 843.8   2. Pain in both knees, unspecified chronicity  M25.561 719.46    M25.562        Patient Active Problem List   Diagnosis    Palpitations    ECG abnormal    Precordial pain    Moderate persistent asthma without complication    Irritable bowel syndrome with constipation    Anxiety and depression    Allergic rhinitis    Subclinical hypothyroidism    Acute pharyngitis        Past Medical History:   Diagnosis Date    Allergic     Allergic rhinitis     Ankle sprain     Right, resolved    Asthma     Bursitis of hip     Ultrasound from PT    Cervical disc disorder     ACDA 2022    Chronic constipation     Fracture, radius ,     Left - not sure if radius/ulna/both    Fracture, ulna ,     Left - not sure if radius/ulna/both    Heart murmur     Mild    Irritable bowel syndrome 2013    Pneumonia     Subclinical hypothyroidism     Upper respiratory infection     Vitamin B 12 deficiency     Vitamin D deficiency         Past Surgical History:   Procedure Laterality Date    APPENDECTOMY N/A 2016    Dr. Duong Johnson    CERVICAL SPINE ANTERIOR      COLONOSCOPY  2018    Normal    DENTAL PROCEDURE      x 2    ENDOSCOPY  2018    Yeast in esophagus    EYE SURGERY  2016    Lasik    LASIK      LEEP      LEEP PROCEDURE    NECK SURGERY  2022    ACDA    SPINE SURGERY  3/24/22    ACDA at [place]    UPPER GASTROINTESTINAL ENDOSCOPY  18                        PT Assessment/Plan       Row Name 25 1600          PT Assessment    Assessment Comments Jose returns to PT for first follow up re: R hamstring strain, B hip, knee, and L ankle pain. Pt did have asthma flare-up upon entering  clinic, which did take about 10 min to begin session. /88 at beginning of session with pt reporting chest tightness. Symptoms subsided after she used inhaler. Deferred RCB warm up today. Began initiating HEP to include bridge over SB, standing HS curls, hip abd iso, and ankle 4 way. Pt did not have any c/o pain or discomfort with these exs, strictly muscle burn from fatigue. Initiated side steps and monster walks as well, with slight discomfort ranging throughout BLE. Educated pt on activity modifications, A&P of BLE, and HEP (see ed section for detail). Will continue to progress as able. Pt remains appropriate for skilled PT at this time.  -        PT Plan    PT Plan Comments assess pt tolerance to first full tx; pt does have severe asthma (ask Sarmad to explain if she hasnt already); consider SLS w/ knee unlocked, HS curl with swiss ball, calf raise on step? per eval note: Prior HEP consisted of bridges which caused anterior knee pain, standing HS curls, seated LAQ, supine sciatic floss with knee flex/ext, SL RDL.  -               User Key  (r) = Recorded By, (t) = Taken By, (c) = Cosigned By      Initials Name Provider Type    Sarmad Hernandez, PT Physical Therapist                       OP Exercises       Row Name 07/03/25 1700             Subjective    Subjective Comments Last night, the pain was keeping me up. I feel like my pain has worsened since evaluation.  -         Subjective Pain    Able to rate subjective pain? yes  -         Total Minutes    40880 - PT Therapeutic Exercise Minutes 35  -      98560 - PT Therapeutic Activity Minutes 5  -DR         Exercise 1    Exercise Name 1 AWILDA  -      Additional Comments next visit; deferred 2' to asthma  -         Exercise 2    Exercise Name 2 standing HS str  -      Cueing 2 Verbal;Philip NICHOLE      Reps 2 3e  -      Time 2 20s  -         Exercise 3    Exercise Name 3 SL clachuck  -      Cueing 3 Verbal;Philip NICHOLE      Sets 3 2e  -       Reps 3 10  -DR      Time 3 YTB  -DR         Exercise 4    Exercise Name 4 bridge with LE over swiss ball  -DR      Cueing 4 Verbal;Demo  -DR      Sets 4 2  -DR      Reps 4 10  -DR      Time 4 green SB  -DR         Exercise 5    Exercise Name 5 hip abd iso  -DR      Cueing 5 Verbal;Demo  -DR      Reps 5 10e  -DR      Time 5 5s  -DR         Exercise 6    Exercise Name 6 lateral stepping  -DR      Cueing 6 Verbal;Demo  -DR      Reps 6 3 laps  -DR      Time 6 YTB thighs  -DR         Exercise 7    Exercise Name 7 monster walks  -DR      Cueing 7 Verbal;Demo  -DR      Reps 7 3 laps  -DR      Time 7 YTB f/b  -DR         Exercise 8    Exercise Name 8 standing HS curls  -DR      Cueing 8 Verbal;Demo  -DR      Sets 8 2e  -DR      Reps 8 15  -DR      Time 8 R BW, L 2# AW  -DR         Exercise 9    Exercise Name 9 ankle 4 way  -DR      Cueing 9 Verbal;Demo  -DR      Reps 9 20e L foot  -DR      Time 9 RTB  -DR         Exercise 10    Exercise Name 10 TA- vital assessment  -DR                User Key  (r) = Recorded By, (t) = Taken By, (c) = Cosigned By      Initials Name Provider Type    Sarmad Hernandez, PT Physical Therapist                                  PT OP Goals       Row Name 07/03/25 1800          PT Short Term Goals    STG Date to Achieve 07/31/25  -     STG 1 Pt. will be independent with initial HEP to improve self-management of condition.  -     STG 1 Progress Ongoing  -     STG 2 Pt. will return to 1 style of dance without increase in pain to allow progression back to all dance styles.  -     STG 2 Progress Ongoing  -        Long Term Goals    LTG Date to Achieve 08/30/25  -     LTG 1 Pt. will be independent with advanced HEP to improve long term management of condition and independence.  -     LTG 1 Progress Ongoing  -     LTG 2 Pt. will report 50% improvement in overall condition to improve QOL.  -     LTG 2 Progress Ongoing  -     LTG 3 Pt. will return to full dance activities without  pain to allow return to PLOF.  -     LTG 3 Progress Ongoing  -     LTG 4 Pt. will improve R hip extension strength >/= 4+/5 without pain to indicate improved hip strength.  -DR RUSH 4 Progress Ongoing  -               User Key  (r) = Recorded By, (t) = Taken By, (c) = Cosigned By      Initials Name Provider Type    Sarmad Hernandez, PT Physical Therapist                    Therapy Education  Education Details: initiated HEP ([id number]); A&P of BLE (hip, knee, ankle), ongoing POC, reviewed activity modifications, avoiding dancing at this time due to intense stress to lower extremity. recommended swimming, RCB, elliptical for dec stress to the LE.  Given: HEP, Symptoms/condition management, Pain management, Posture/body mechanics, Mobility training  Program: New, Progressed, Reinforced  How Provided: Verbal, Demonstration, Written  Provided to: Patient  Level of Understanding: Teach back education performed, Verbalized, Demonstrated  98860 - PT Self Care/Mgmt Minutes: 15              Time Calculation:   Start Time: 1705  Stop Time: 1800  Time Calculation (min): 55 min  Timed Charges  20173 - PT Therapeutic Exercise Minutes: 35  71522 - PT Therapeutic Activity Minutes: 5  97097 - PT Self Care/Mgmt Minutes: 15  Total Minutes  Timed Charges Total Minutes: 55   Total Minutes: 55  Therapy Charges for Today       Code Description Service Date Service Provider Modifiers Qty    93456694979 HC PT THER PROC EA 15 MIN 7/3/2025 Sarmad Banegas, PT GP 3    84062644507 HC PT SELF CARE/MGMT/TRAIN EA 15 MIN 7/3/2025 Sarmad Banegas, PT GP 1                      Sarmad Banegas, HEMANT  7/3/2025

## 2025-07-09 ENCOUNTER — APPOINTMENT (OUTPATIENT)
Dept: PHYSICAL THERAPY | Facility: HOSPITAL | Age: 35
End: 2025-07-09
Payer: COMMERCIAL

## 2025-07-10 ENCOUNTER — HOSPITAL ENCOUNTER (OUTPATIENT)
Dept: PHYSICAL THERAPY | Facility: HOSPITAL | Age: 35
Setting detail: THERAPIES SERIES
Discharge: HOME | End: 2025-07-10
Payer: COMMERCIAL

## 2025-07-10 DIAGNOSIS — M25.562 PAIN IN BOTH KNEES, UNSPECIFIED CHRONICITY: ICD-10-CM

## 2025-07-10 DIAGNOSIS — M25.561 PAIN IN BOTH KNEES, UNSPECIFIED CHRONICITY: ICD-10-CM

## 2025-07-10 DIAGNOSIS — S76.311A STRAIN OF RIGHT HAMSTRING MUSCLE, INITIAL ENCOUNTER: Primary | ICD-10-CM

## 2025-07-10 PROCEDURE — 97535 SELF CARE MNGMENT TRAINING: CPT

## 2025-07-10 PROCEDURE — 97110 THERAPEUTIC EXERCISES: CPT

## 2025-07-10 NOTE — THERAPY TREATMENT NOTE
Outpatient Physical Therapy Ortho Treatment Note  [facility]     Patient Name: Jose Wadsworth  : 1990  MRN: [id number]  Today's Date: 7/10/2025      Visit Date: 07/10/2025    Visit Dx:    ICD-10-CM ICD-9-CM   1. Strain of right hamstring muscle, initial encounter  S76.311A 843.8   2. Pain in both knees, unspecified chronicity  M25.561 719.46    M25.562        Patient Active Problem List   Diagnosis    Palpitations    ECG abnormal    Precordial pain    Moderate persistent asthma without complication    Irritable bowel syndrome with constipation    Anxiety and depression    Allergic rhinitis    Subclinical hypothyroidism    Acute pharyngitis        Past Medical History:   Diagnosis Date    Allergic     Allergic rhinitis     Ankle sprain 2012    Right, resolved    Asthma     Bursitis of hip     Ultrasound from PT    Cervical disc disorder     ACDA 2022    Chronic constipation     Fracture, radius ,     Left - not sure if radius/ulna/both    Fracture, ulna ,     Left - not sure if radius/ulna/both    Heart murmur     Mild    Irritable bowel syndrome 2013    Pneumonia     Subclinical hypothyroidism     Upper respiratory infection     Vitamin B 12 deficiency     Vitamin D deficiency         Past Surgical History:   Procedure Laterality Date    APPENDECTOMY N/A 2016    Dr. Duong Johnson    CERVICAL SPINE ANTERIOR      COLONOSCOPY  2018    Normal    DENTAL PROCEDURE      x 2    ENDOSCOPY  2018    Yeast in esophagus    EYE SURGERY  2016    Lasik    LASIK      LEEP      LEEP PROCEDURE    NECK SURGERY  2022    ACDA    SPINE SURGERY  3/24/22    ACDA at [place]    UPPER GASTROINTESTINAL ENDOSCOPY  18                        PT Assessment/Plan       Row Name 07/10/25 1600          PT Assessment    Assessment Comments Jose arrives to PT with reports of significant flare up in symptoms following first f/u visit. She feels the standing HS curls are the  biggest contributor to her increased pain. Therefore, various adjustments made throughout session to improve patient tolerance and reduce risk of flare up. Added supine gentle HS curls with SB, ankle 4 way with foot resting on floor and gentle AR press with good tolerance. Discussed with patient adjustments to make within HEP and appropriate response with exercise progressions vs stretching. Educated pt on potential relationship between trigger point referral pain and pelvic floor involvement based on pain referral and deep medial ischial tuberosity pain. Pt verbalized understanding. Ended session with seated fig 4 piriformis stretch as pt plans to leave for long car ride tomorrow and concerned regarding her sitting tolerance. Patient reports positive reproduction in pain with gentle seated stretch. Jose Wadsworth remains a candidate for skilled PT.  -JS        PT Plan    PT Plan Comments response to last session and adding seated fig 4 piriformis stretch while sitting during car ride, if less flare up compared to previous session potentially adjust HEP, consider lateral hip hike, gentle STM to R posterolateral hip girdle, supine piriformis stretch? lateral step ups + knee ?  -JS               User Key  (r) = Recorded By, (t) = Taken By, (c) = Cosigned By      Initials Name Provider Type    Noelle Jaquez, PT Physical Therapist                       OP Exercises       Row Name 07/10/25 1600 07/10/25 1500          Subjective    Subjective Comments -- I was really flared up after last time. I dont know if it was some of the things we did during the session or within my HEP  -JS        Total Minutes    61895 - PT Therapeutic Exercise Minutes --  -JS 43  -JS        Exercise 1    Exercise Name 1 -- RCB  -JS     Time 1 -- 5 mins  -JS     Additional Comments -- seat 5  -JS        Exercise 2    Exercise Name 2 -- standing HS str  -JS     Cueing 2 -- Verbal;Demo  -JS     Reps 2 -- 3e  -JS     Time 2 -- 20s   -JS        Exercise 3    Exercise Name 3 -- SL clamshells  -JS     Cueing 3 -- Verbal;Demo  -JS     Sets 3 -- 2e  -JS     Reps 3 -- 10  -JS     Time 3 -- YTB  -JS        Exercise 4    Exercise Name 4 -- bridge with LE over swiss ball  -JS     Cueing 4 -- Verbal;Demo  -JS     Sets 4 -- 2  -JS     Reps 4 -- 10  -JS     Time 4 -- green SB  -JS        Exercise 6    Exercise Name 6 -- lateral stepping  -JS     Cueing 6 -- Verbal;Demo  -JS     Reps 6 -- 3 laps  -JS     Time 6 -- YTB thighs  -JS        Exercise 7    Exercise Name 7 -- monster walks  -JS     Cueing 7 -- Verbal;Demo  -JS     Reps 7 -- 3 laps  -JS     Time 7 -- YTB f/b  -JS        Exercise 8    Exercise Name 8 -- supine DKTC HS curls  -JS     Cueing 8 -- Verbal;Tactile  -JS     Sets 8 -- 2  -JS     Reps 8 -- 10e  -JS     Time 8 -- green SB  -JS     Additional Comments -- gentle press of heels into ball for HS activation  -JS        Exercise 9    Exercise Name 9 -- ankle 4 way  -JS     Cueing 9 -- Verbal;Demo  -JS     Reps 9 -- 20e L foot  -JS     Time 9 -- RTB  -JS        Exercise 10    Exercise Name 10 -- AR press  -JS     Cueing 10 -- Verbal;Demo  -JS     Sets 10 -- 2  -JS     Reps 10 -- 10e  -JS     Time 10 -- RTB  -JS        Exercise 11    Exercise Name 11 -- seated piriformis stretch  -JS     Cueing 11 -- Verbal;Demo  -JS     Reps 11 -- 3  -JS     Time 11 -- 20s  -JS     Additional Comments -- R only, pt reports positive reproduction in pain  -JS               User Key  (r) = Recorded By, (t) = Taken By, (c) = Cosigned By      Initials Name Provider Type    Noelle Jaquez, PT Physical Therapist                                  PT OP Goals       Row Name 07/10/25 1500          PT Short Term Goals    STG Date to Achieve 07/31/25  -JS     STG 1 Pt. will be independent with initial HEP to improve self-management of condition.  -JS     STG 1 Progress Ongoing  -JS     STG 2 Pt. will return to 1 style of dance without increase in pain to allow progression  back to all dance styles.  -JS     STG 2 Progress Ongoing  -JS        Long Term Goals    LTG Date to Achieve 08/30/25  -JS     LTG 1 Pt. will be independent with advanced HEP to improve long term management of condition and independence.  -JS     LTG 1 Progress Ongoing  -JS     LTG 2 Pt. will report 50% improvement in overall condition to improve QOL.  -JS     LTG 2 Progress Ongoing  -JS     LTG 3 Pt. will return to full dance activities without pain to allow return to PLOF.  -JS     LTG 3 Progress Ongoing  -JS     LTG 4 Pt. will improve R hip extension strength >/= 4+/5 without pain to indicate improved hip strength.  -JS     LTG 4 Progress Ongoing  -JS               User Key  (r) = Recorded By, (t) = Taken By, (c) = Cosigned By      Initials Name Provider Type    Noelle Jaquez, PT Physical Therapist                    Therapy Education  Education Details: adjustments to make within HEP, appropriate response with exercise progressions vs stretching, potential relationship between trigger point referral and pelvic floor involvement based on pain referral and deep medial ischial tuberosity pain, discussed benefit of foot stool while sitting at work to reduce buttock/hip pain  Given: HEP, Symptoms/condition management, Pain management, Posture/body mechanics  Program: Reinforced  How Provided: Verbal, Demonstration  Provided to: Patient  Level of Understanding: Teach back education performed, Verbalized, Demonstrated  25264 - PT Self Care/Mgmt Minutes: 10              Time Calculation:   Start Time: 1530  Stop Time: 1623  Time Calculation (min): 53 min  Timed Charges  54776 - PT Therapeutic Exercise Minutes: 43  82301 - PT Self Care/Mgmt Minutes: 10  Total Minutes  Timed Charges Total Minutes: 53   Total Minutes: 53  Therapy Charges for Today       Code Description Service Date Service Provider Modifiers Qty    41015433676  PT THER PROC EA 15 MIN 7/10/2025 Noelle Sullivan, PT GP 3    48155066197 HC PT SELF  CARE/MGMT/TRAIN EA 15 MIN 7/10/2025 Noelle Sullivan, PT GP 1                      Noelle Sullivan, PT  7/10/2025

## 2025-07-11 ENCOUNTER — APPOINTMENT (OUTPATIENT)
Dept: PHYSICAL THERAPY | Facility: HOSPITAL | Age: 35
End: 2025-07-11
Payer: COMMERCIAL

## 2025-07-15 DIAGNOSIS — E03.9 HYPOTHYROIDISM, UNSPECIFIED TYPE: ICD-10-CM

## 2025-07-15 RX ORDER — LEVOTHYROXINE SODIUM 25 UG/1
25 TABLET ORAL EVERY MORNING
Qty: 90 TABLET | Refills: 0 | Status: SHIPPED | OUTPATIENT
Start: 2025-07-15

## 2025-07-16 ENCOUNTER — HOSPITAL ENCOUNTER (OUTPATIENT)
Dept: PHYSICAL THERAPY | Facility: HOSPITAL | Age: 35
Setting detail: THERAPIES SERIES
Discharge: HOME | End: 2025-07-16
Payer: COMMERCIAL

## 2025-07-16 DIAGNOSIS — M25.561 PAIN IN BOTH KNEES, UNSPECIFIED CHRONICITY: ICD-10-CM

## 2025-07-16 DIAGNOSIS — S76.311A STRAIN OF RIGHT HAMSTRING MUSCLE, INITIAL ENCOUNTER: Primary | ICD-10-CM

## 2025-07-16 DIAGNOSIS — M25.562 PAIN IN BOTH KNEES, UNSPECIFIED CHRONICITY: ICD-10-CM

## 2025-07-16 PROCEDURE — 97140 MANUAL THERAPY 1/> REGIONS: CPT

## 2025-07-16 PROCEDURE — 97110 THERAPEUTIC EXERCISES: CPT

## 2025-07-16 NOTE — THERAPY TREATMENT NOTE
Outpatient Physical Therapy Ortho Treatment Note  [facility]     Patient Name: Jose Wadsworth  : 1990  MRN: [id number]  Today's Date: 2025      Visit Date: 2025    Visit Dx:    ICD-10-CM ICD-9-CM   1. Strain of right hamstring muscle, initial encounter  S76.311A 843.8   2. Pain in both knees, unspecified chronicity  M25.561 719.46    M25.562        Patient Active Problem List   Diagnosis    Palpitations    ECG abnormal    Precordial pain    Moderate persistent asthma without complication    Irritable bowel syndrome with constipation    Anxiety and depression    Allergic rhinitis    Subclinical hypothyroidism    Acute pharyngitis        Past Medical History:   Diagnosis Date    Allergic     Allergic rhinitis     Ankle sprain     Right, resolved    Asthma     Bursitis of hip     Ultrasound from PT    Cervical disc disorder 2018    ACDA 2022    Chronic constipation     Fracture, radius ,     Left - not sure if radius/ulna/both    Fracture, ulna ,     Left - not sure if radius/ulna/both    Heart murmur     Mild    Irritable bowel syndrome 2013    Pneumonia     Subclinical hypothyroidism     Upper respiratory infection     Vitamin B 12 deficiency     Vitamin D deficiency         Past Surgical History:   Procedure Laterality Date    APPENDECTOMY N/A 2016    Dr. Duong Johnson    CERVICAL SPINE ANTERIOR      COLONOSCOPY  2018    Normal    DENTAL PROCEDURE      x 2    ENDOSCOPY  2018    Yeast in esophagus    EYE SURGERY  2016    Lasik    LASIK      LEEP      LEEP PROCEDURE    NECK SURGERY  2022    ACDA    SPINE SURGERY  3/24/22    ACDA at [place]    UPPER GASTROINTESTINAL ENDOSCOPY  18                        PT Assessment/Plan       Row Name 25 1700          PT Assessment    Assessment Comments Jose arrives to PT reporting improved pain levels in B feet and ability to complete HEP with no pain, however, worsening and ongoing pain  when finished with HEP. Gentle progressions today to include lateral hip hikes, supine piriformis stretch, and modified valerio stretch to address tightness and weakness in R hip girdle. All tolerated well. Attempted STM with foam roll to R posterolateral hip girdle, however, poor tolerance in L sidelying and when attempting prone quad STM, after a few minutes, inc pain radiating into R posterior hip girdle. Valerio str relieved this pain. Updated HEP to include tolerated stretches today. Pt remains appropriate for skilled PT.  -DR        PT Plan    PT Plan Comments response to last session? potentially adjust HEP pending tolerance. lateral step ups + knee ? gentle progressions  -               User Key  (r) = Recorded By, (t) = Taken By, (c) = Cosigned By      Initials Name Provider Type    Sarmad Hernandez, PT Physical Therapist                       OP Exercises       Row Name 07/16/25 1700 07/16/25 1600          Subjective    Subjective Comments -- Completing exercises has gotten better, but its still the pain after.  -        Total Minutes    79313 - PT Therapeutic Exercise Minutes -- 37  -DR     51971 - PT Manual Therapy Minutes --  -DR 8  -DR        Exercise 1    Exercise Name 1 -- RCB  -DR     Time 1 -- 5 mins  -DR     Additional Comments -- st 5  -DR        Exercise 2    Exercise Name 2 -- standing HS str  -DR     Cueing 2 -- Verbal;Philip  -     Reps 2 -- 3e  -DR     Time 2 -- 20s  -DR        Exercise 3    Exercise Name 3 -- SL clamsjose cruzls  -     Cueing 3 -- Verbal;Philip  -     Sets 3 -- 2e  -DR     Reps 3 -- 10  -DR     Time 3 -- YTB  -DR        Exercise 4    Exercise Name 4 -- bridge with LE over swiss ball  -DR     Cueing 4 -- Verbal;Demo  -DR     Sets 4 -- 2  -DR     Reps 4 -- 10  -DR     Time 4 -- green SB  -DR        Exercise 5    Exercise Name 5 -- modified valerio str  -DR     Cueing 5 -- Verbal;Philip NICHOLE     Reps 5 -- 3R  -DR     Time 5 -- 20s  -DR        Exercise 8    Exercise Name 8  -- supine DKTC HS curls  -DR     Cueing 8 -- Verbal;Tactile  -DR     Sets 8 -- 2  -DR     Reps 8 -- 10e  -DR     Time 8 -- green SB  -DR     Additional Comments -- gentle press of heels into ball for HS activation  -DR        Exercise 10    Exercise Name 10 -- AR press  -DR     Cueing 10 -- Verbal;Demo  -DR     Sets 10 -- 2  -DR     Reps 10 -- 10e  -DR     Time 10 -- RTB  -DR        Exercise 11    Exercise Name 11 -- supine piriformis stretch  -DR     Cueing 11 -- Verbal;Demo  -DR     Reps 11 -- 3e  -DR     Time 11 -- 20s  -DR     Additional Comments -- no reproduction of pain, only stretch to R glute  -DR        Exercise 12    Exercise Name 12 -- lateral hip hikes  -DR     Cueing 12 -- Verbal;Demo  -DR     Reps 12 -- 2e  -DR     Time 12 -- 10  -DR               User Key  (r) = Recorded By, (t) = Taken By, (c) = Cosigned By      Initials Name Provider Type    Sarmad Hernandez, PT Physical Therapist                             Manual Rx (Last 36 Hours)       Manual Treatments       Row Name 07/16/25 1700 07/16/25 1600          Total Minutes    07231 - PT Manual Therapy Minutes --  -DR 8  -DR        Manual Rx 1    Manual Rx 1 Location STM posterolateral hip girdle R and R quad  -DR --     Manual Rx 1 Type foam roll  -DR --               User Key  (r) = Recorded By, (t) = Taken By, (c) = Cosigned By      Initials Name Provider Type    Sarmad Hernandez, PT Physical Therapist                     PT OP Goals       Row Name 07/16/25 1600          PT Short Term Goals    STG Date to Achieve 07/31/25  -     STG 1 Pt. will be independent with initial HEP to improve self-management of condition.  -     STG 1 Progress Ongoing  -     STG 2 Pt. will return to 1 style of dance without increase in pain to allow progression back to all dance styles.  -     STG 2 Progress Ongoing  -DR        Long Term Goals    LTG Date to Achieve 08/30/25  -     LTG 1 Pt. will be independent with advanced HEP to improve long term  management of condition and independence.  -DR RODRIGUEZG 1 Progress Ongoing  -DR RUSH 2 Pt. will report 50% improvement in overall condition to improve QOL.  -DR RUSH 2 Progress Ongoing  -DR RUSH 3 Pt. will return to full dance activities without pain to allow return to PLOF.  -DR RUSH 3 Progress Ongoing  -DR RODRIGUEZG 4 Pt. will improve R hip extension strength >/= 4+/5 without pain to indicate improved hip strength.  -DR RUSH 4 Progress Ongoing  -               User Key  (r) = Recorded By, (t) = Taken By, (c) = Cosigned By      Initials Name Provider Type    Sarmad Hernandez, PT Physical Therapist                    Therapy Education  Education Details: updated HEP to include modified mckayla str and supine piriformis str  Given: HEP, Symptoms/condition management, Pain management, Posture/body mechanics  Program: Reinforced, Progressed  How Provided: Verbal, Demonstration, Written  Provided to: Patient  Level of Understanding: Teach back education performed, Verbalized, Demonstrated              Time Calculation:   Start Time: 1645  Stop Time: 1730  Time Calculation (min): 45 min  Timed Charges  22928 - PT Therapeutic Exercise Minutes: 37  53329 - PT Manual Therapy Minutes: 8  Total Minutes  Timed Charges Total Minutes: 45   Total Minutes: 45  Therapy Charges for Today       Code Description Service Date Service Provider Modifiers Qty    86066894749 HC PT THER PROC EA 15 MIN 7/16/2025 Sarmad Banegas, PT GP 2    [id number] HC PT MANUAL THERAPY EA 15 MIN 7/16/2025 Sarmad Banegas, PT GP 1                      Sarmad Banegas PT  7/16/2025

## 2025-07-18 ENCOUNTER — HOSPITAL ENCOUNTER (OUTPATIENT)
Dept: PHYSICAL THERAPY | Facility: HOSPITAL | Age: 35
Setting detail: THERAPIES SERIES
Discharge: HOME | End: 2025-07-18
Payer: COMMERCIAL

## 2025-07-18 DIAGNOSIS — M25.561 PAIN IN BOTH KNEES, UNSPECIFIED CHRONICITY: ICD-10-CM

## 2025-07-18 DIAGNOSIS — S76.311A STRAIN OF RIGHT HAMSTRING MUSCLE, INITIAL ENCOUNTER: Primary | ICD-10-CM

## 2025-07-18 DIAGNOSIS — M25.562 PAIN IN BOTH KNEES, UNSPECIFIED CHRONICITY: ICD-10-CM

## 2025-07-18 PROCEDURE — 97140 MANUAL THERAPY 1/> REGIONS: CPT | Performed by: PHYSICAL THERAPIST

## 2025-07-18 PROCEDURE — 97110 THERAPEUTIC EXERCISES: CPT | Performed by: PHYSICAL THERAPIST

## 2025-07-18 NOTE — THERAPY TREATMENT NOTE
Outpatient Physical Therapy Ortho Treatment Note  [facility]     Patient Name: Jose Wadsworth  : 1990  MRN: [id number]  Today's Date: 2025      Visit Date: 2025    Visit Dx:    ICD-10-CM ICD-9-CM   1. Strain of right hamstring muscle, initial encounter  S76.311A 843.8   2. Pain in both knees, unspecified chronicity  M25.561 719.46    M25.562        Patient Active Problem List   Diagnosis    Palpitations    ECG abnormal    Precordial pain    Moderate persistent asthma without complication    Irritable bowel syndrome with constipation    Anxiety and depression    Allergic rhinitis    Subclinical hypothyroidism    Acute pharyngitis        Past Medical History:   Diagnosis Date    Allergic     Allergic rhinitis     Ankle sprain     Right, resolved    Asthma     Bursitis of hip     Ultrasound from PT    Cervical disc disorder 2018    ACDA 2022    Chronic constipation     Fracture, radius ,     Left - not sure if radius/ulna/both    Fracture, ulna ,     Left - not sure if radius/ulna/both    Heart murmur     Mild    Irritable bowel syndrome 2013    Pneumonia     Subclinical hypothyroidism     Upper respiratory infection     Vitamin B 12 deficiency     Vitamin D deficiency         Past Surgical History:   Procedure Laterality Date    APPENDECTOMY N/A 2016    Dr. Duong Johnson    CERVICAL SPINE ANTERIOR      COLONOSCOPY  2018    Normal    DENTAL PROCEDURE      x 2    ENDOSCOPY  2018    Yeast in esophagus    EYE SURGERY  2016    Lasik    LASIK      LEEP      LEEP PROCEDURE    NECK SURGERY  2022    ACDA    SPINE SURGERY  3/24/22    ACDA at [facility]    UPPER GASTROINTESTINAL ENDOSCOPY  18                        PT Assessment/Plan       Row Name 25 0801          PT Assessment    Assessment Comments Ms. Wadsworth returns to the clinic for her R HS strain/bilateral knee pain. She reports no new complaints, continuing to report dull/sharp  pain in her R lateral thigh/glut tissues. She does report her knees are not giving her as much trouble as prior to initiating therapy.  We continued to warm up on recumbent bike and challenged hip girdle/core strengthenig activities today. Added shoulder ext in SLS, and returned to lateral stepping/monster walking.  Progressed resistance to red theraband on several exercises without immediate adverse response. No changes to HEP at this time.  Revisited STM to the right posterior lateral hip girdle/right lateral thigh utilizing foam roller.  She demonstrates significant improvement in tolerance to this technique today.  Educated in self trigger point release to piriformis with tennis ball against a wall.  Reviewed ergonomic and postural implications.Ms. Wadsworth continues to good candidate for skilled physical therapy  -GJ        PT Plan    PT Plan Comments Assess response to previous session with progression of exercises and resistance as well as manual techniques to the right lateral thigh and posterior hip girdle tissue.  Repeat manual as needed.  Gently progress hip girdle core strengthening as able.  May consider supine hamstring 90/90 with dorsiflexion stretch  -GJ               User Key  (r) = Recorded By, (t) = Taken By, (c) = Cosigned By      Initials Name Provider Type     Sander Pratt, PT Physical Therapist                       OP Exercises       Row Name 07/18/25 0748 07/18/25 0700          Subjective    Subjective Comments -- same pain, (R) lateral thigh/glut region, between dull and sharp, no N/T  -GJ        Subjective Pain    Pre-Treatment Pain Level -- 3  -GJ        Total Minutes    02352 - PT Therapeutic Exercise Minutes 30  -GJ --     75454 - PT Manual Therapy Minutes 10  -GJ --        Exercise 1    Exercise Name 1 -- RCB  -GJ     Time 1 -- 5 mins  -GJ     Additional Comments -- seate 5  -GJ        Exercise 3    Exercise Name 3 -- SL clamshells  -GJ     Cueing 3 -- Verbal;Demo  -GJ     Sets 3  -- 2e  -GJ     Reps 3 -- 10  -GJ     Time 3 -- RTB  -GJ        Exercise 6    Exercise Name 6 -- lateral stepping  -GJ     Cueing 6 -- Verbal;Demo  -GJ     Reps 6 -- 3 laps  -GJ     Time 6 -- RTB, just distal to knees  -GJ        Exercise 7    Exercise Name 7 -- monster walks  -GJ     Cueing 7 -- Verbal;Demo  -GJ     Reps 7 -- 3 laps  -GJ     Time 7 -- RTB, just distal to knees,  -GJ     Additional Comments -- F/B, cue to avoid pelvic dump  -GJ        Exercise 8    Exercise Name 8 -- supine DKTC HS curls  -GJ     Cueing 8 -- Verbal;Tactile  -GJ     Sets 8 -- 2  -GJ     Reps 8 -- 10  -GJ     Time 8 -- green SB  -GJ     Additional Comments -- gentle press of heels into ball for HS activation  -GJ        Exercise 10    Exercise Name 10 -- AR press  -GJ     Cueing 10 -- Verbal;Demo  -GJ     Sets 10 -- 2  -GJ     Reps 10 -- 10e  -GJ     Time 10 -- RTB  -GJ        Exercise 12    Exercise Name 12 -- lateral hip hikes  -GJ     Cueing 12 -- Verbal;Demo  -GJ     Reps 12 -- 2B  -GJ     Time 12 -- 10  -GJ        Exercise 13    Exercise Name 13 -- shoulder ext in SLS, B  -GJ     Cueing 13 -- Verbal;Tactile;Demo  -GJ     Sets 13 -- 2  -GJ     Reps 13 -- 10  -GJ     Time 13 -- RTB  -GJ               User Key  (r) = Recorded By, (t) = Taken By, (c) = Cosigned By      Initials Name Provider Type    Sander Fernandez, PT Physical Therapist                             Manual Rx (Last 36 Hours)       Manual Treatments       Row Name 07/18/25 0748 07/18/25 0700          Total Minutes    37951 - PT Manual Therapy Minutes 10  -GJ --        Manual Rx 1    Manual Rx 1 Location -- STM to posterior lateral hip girdle/lateral thigh  -GJ     Manual Rx 1 Type -- Patient in left side-lying with pillow between knees  -GJ     Manual Rx 1 Grade -- Use foam roller  -GJ               User Key  (r) = Recorded By, (t) = Taken By, (c) = Cosigned By      Initials Name Provider Type    Sander Fernandez, PT Physical Therapist                     PT OP  Goals       Row Name 07/18/25 0700          PT Short Term Goals    STG Date to Achieve 07/31/25  -GJ     STG 1 Pt. will be independent with initial HEP to improve self-management of condition.  -GJ     STG 1 Progress Met  -GJ     STG 2 Pt. will return to 1 style of dance without increase in pain to allow progression back to all dance styles.  -GJ     STG 2 Progress Ongoing  -GJ        Long Term Goals    LTG Date to Achieve 08/30/25  -GJ     LTG 1 Pt. will be independent with advanced HEP to improve long term management of condition and independence.  -GJ     LTG 1 Progress Ongoing  -GJ     LTG 2 Pt. will report 50% improvement in overall condition to improve QOL.  -GJ     LTG 2 Progress Ongoing  -GJ     LTG 3 Pt. will return to full dance activities without pain to allow return to PLOF.  -GJ     LTG 3 Progress Ongoing  -GJ     LTG 4 Pt. will improve R hip extension strength >/= 4+/5 without pain to indicate improved hip strength.  -GJ     LTG 4 Progress Ongoing  -GJ               User Key  (r) = Recorded By, (t) = Taken By, (c) = Cosigned By      Initials Name Provider Type    Sander Fernandez, PT Physical Therapist                    Therapy Education  Education Details: reviewed activity modifications.  Educated patient in self trigger point release of the piriformis utilizing tennis ball against a wall.  Discussed ergonomic and postural implications for the condition.  Given: HEP, Symptoms/condition management, Pain management, Posture/body mechanics, Fall prevention and home safety, Mobility training  Program: Reinforced, Progressed, New  How Provided: Verbal, Demonstration  Provided to: Patient  Level of Understanding: Teach back education performed, Verbalized, Demonstrated              Time Calculation:   Start Time: 0748  Stop Time: 0828  Time Calculation (min): 40 min  Timed Charges  01396 - PT Therapeutic Exercise Minutes: 30  85924 - PT Manual Therapy Minutes: 10  Total Minutes  Timed Charges Total  Minutes: 40   Total Minutes: 40  Therapy Charges for Today       Code Description Service Date Service Provider Modifiers Qty    09488899842  PT THER PROC EA 15 MIN 7/18/2025 Sander Pratt, PT GP 2    [id number]  PT MANUAL THERAPY EA 15 MIN 7/18/2025 Sander Pratt, PT GP 1                      Sander Pratt, PT  7/18/2025

## 2025-07-22 ENCOUNTER — HOSPITAL ENCOUNTER (OUTPATIENT)
Dept: PHYSICAL THERAPY | Facility: HOSPITAL | Age: 35
Setting detail: THERAPIES SERIES
Discharge: HOME | End: 2025-07-22
Payer: COMMERCIAL

## 2025-07-22 DIAGNOSIS — M25.561 PAIN IN BOTH KNEES, UNSPECIFIED CHRONICITY: ICD-10-CM

## 2025-07-22 DIAGNOSIS — M25.562 PAIN IN BOTH KNEES, UNSPECIFIED CHRONICITY: ICD-10-CM

## 2025-07-22 DIAGNOSIS — S76.311A STRAIN OF RIGHT HAMSTRING MUSCLE, INITIAL ENCOUNTER: Primary | ICD-10-CM

## 2025-07-22 PROCEDURE — 97110 THERAPEUTIC EXERCISES: CPT

## 2025-07-22 PROCEDURE — 97140 MANUAL THERAPY 1/> REGIONS: CPT

## 2025-07-22 NOTE — THERAPY TREATMENT NOTE
Outpatient Physical Therapy Ortho Treatment Note  [facility]     Patient Name: Jose Wadsworth  : 1990  MRN: [id number]  Today's Date: 2025      Visit Date: 2025    Visit Dx:    ICD-10-CM ICD-9-CM   1. Strain of right hamstring muscle, initial encounter  S76.311A 843.8   2. Pain in both knees, unspecified chronicity  M25.561 719.46    M25.562        Patient Active Problem List   Diagnosis    Palpitations    ECG abnormal    Precordial pain    Moderate persistent asthma without complication    Irritable bowel syndrome with constipation    Anxiety and depression    Allergic rhinitis    Subclinical hypothyroidism    Acute pharyngitis        Past Medical History:   Diagnosis Date    Allergic     Allergic rhinitis     Ankle sprain     Right, resolved    Asthma     Bursitis of hip     Ultrasound from PT    Cervical disc disorder     ACDA 2022    Chronic constipation     Fracture, radius ,     Left - not sure if radius/ulna/both    Fracture, ulna ,     Left - not sure if radius/ulna/both    Heart murmur     Mild    Irritable bowel syndrome 2013    Pneumonia     Subclinical hypothyroidism     Upper respiratory infection     Vitamin B 12 deficiency     Vitamin D deficiency         Past Surgical History:   Procedure Laterality Date    APPENDECTOMY N/A 2016    Dr. Duong Johnson    CERVICAL SPINE ANTERIOR      COLONOSCOPY  2018    Normal    DENTAL PROCEDURE      x 2    ENDOSCOPY  2018    Yeast in esophagus    EYE SURGERY  2016    Lasik    LASIK      LEEP      LEEP PROCEDURE    NECK SURGERY  2022    ACDA    SPINE SURGERY  3/24/22    ACDA at [facility]    UPPER GASTROINTESTINAL ENDOSCOPY  18                        PT Assessment/Plan       Row Name 25 0700          PT Assessment    Assessment Comments Pt returns to PT reporting good tolerance to last visit, some symptom onset into R knee during sleep that night, but nothing into the next  morning. Pt does see ortho MD for 6wk follow-up tomorrow to determine ongoing symptoms and treatment options. Able to progress flexibility exs with adding supine 90/90 hamstring str with DF and progress core strength with adding PPT with LE ext. No adverse response with these progressions; expected muscle fatigue. Ended visit with DTM to R posterolateral hip girdle, which pt has continued to benefit from. Pt remains appropriate for skilled PT at this time.  -DR        PT Plan    PT Plan Comments Assess response to previous session with progression of exercises and resistance as well as manual techniques to the right lateral thigh and posterior hip girdle tissue; repeat manual as needed. continue core strength progressions as able- bird dog (if pt can tolerate on knees), bearcrawl hold, stir the pot?  -               User Key  (r) = Recorded By, (t) = Taken By, (c) = Cosigned By      Initials Name Provider Type    Sarmad Hernandez, PT Physical Therapist                       OP Exercises       Row Name 07/22/25 0700             Subjective    Subjective Comments I just noticed my front of the right knee woke me up from a pinching sensation the night of last visit, but otherwise, I didn't note anything else.  -DR         Total Minutes    40301 - PT Therapeutic Exercise Minutes 30  -DR      00121 - PT Manual Therapy Minutes 12  -DR         Exercise 1    Exercise Name 1 RCB  -DR      Time 1 5 mins  -DR      Additional Comments st 5  -DR         Exercise 2    Exercise Name 2 supine hamstring 90/90 with dorsiflexion stretch  -DR      Cueing 2 Verbal;Demo  -DR      Sets 2 2  -DR      Reps 2 10e  -DR         Exercise 5    Exercise Name 5 modified mckayla str  -DR      Cueing 5 Verbal;Demo  -DR      Reps 5 3R  -DR      Time 5 20s  -DR         Exercise 8    Exercise Name 8 supine DKTC HS curls  -DR      Cueing 8 Verbal;Tactile  -DR      Sets 8 2  -DR      Reps 8 10  -DR      Time 8 green SB  -DR      Additional Comments  gentle press of heels into ball for HS activation  -DR         Exercise 9    Exercise Name 9 PPT with LE ext  -DR      Cueing 9 Verbal;Demo  -DR      Sets 9 2e  -DR      Reps 9 10 alt  -DR         Exercise 10    Exercise Name 10 AR press  -DR      Cueing 10 Verbal;Demo  -DR      Sets 10 2  -DR      Reps 10 10e  -DR      Time 10 RTB  -DR         Exercise 12    Exercise Name 12 lateral hip hikes  -DR      Cueing 12 Verbal;Demo  -DR      Reps 12 2B  -DR      Time 12 10  -DR         Exercise 13    Exercise Name 13 shoulder ext in SLS, B  -DR      Cueing 13 Verbal;Tactile;Demo  -DR      Sets 13 2  -DR      Reps 13 10  -DR      Time 13 RTB  -DR                User Key  (r) = Recorded By, (t) = Taken By, (c) = Cosigned By      Initials Name Provider Type    Sarmad Hernandez, PT Physical Therapist                             Manual Rx (Last 36 Hours)       Manual Treatments       Row Name 07/22/25 0700             Total Minutes    51905 - PT Manual Therapy Minutes 12  -DR         Manual Rx 1    Manual Rx 1 Location STM to posterior lateral hip girdle/lateral thigh  -DR      Manual Rx 1 Type Patient in left side-lying with pillow between knees  -DR      Manual Rx 1 Grade Use foam roller  -DR                User Key  (r) = Recorded By, (t) = Taken By, (c) = Cosigned By      Initials Name Provider Type    Sarmad Hernandez, PT Physical Therapist                     PT OP Goals       Row Name 07/22/25 0700          PT Short Term Goals    STG Date to Achieve 07/31/25  -DR     STG 1 Pt. will be independent with initial HEP to improve self-management of condition.  -     STG 1 Progress Met  -     STG 2 Pt. will return to 1 style of dance without increase in pain to allow progression back to all dance styles.  -     STG 2 Progress Ongoing  -DR        Long Term Goals    LTG Date to Achieve 08/30/25  -DR     LTG 1 Pt. will be independent with advanced HEP to improve long term management of condition and independence.  -      LTG 1 Progress Ongoing  -DR RODRIGUEZG 2 Pt. will report 50% improvement in overall condition to improve QOL.  -DR RODRIGUEZG 2 Progress Ongoing  -DR RODRIGUEZG 3 Pt. will return to full dance activities without pain to allow return to PLOF.  -DR RODRIGUEZG 3 Progress Ongoing  -DR RODRIGUEZG 4 Pt. will improve R hip extension strength >/= 4+/5 without pain to indicate improved hip strength.  -DR RODRIGUEZG 4 Progress Ongoing  -               User Key  (r) = Recorded By, (t) = Taken By, (c) = Cosigned By      Initials Name Provider Type    Sarmad Hernandez, PT Physical Therapist                    Therapy Education  Given: HEP, Symptoms/condition management, Pain management, Posture/body mechanics, Fall prevention and home safety, Mobility training  Program: Reinforced  How Provided: Verbal, Demonstration  Provided to: Patient  Level of Understanding: Teach back education performed, Verbalized              Time Calculation:   Start Time: 0721  Stop Time: 0803  Time Calculation (min): 42 min  Timed Charges  86404 - PT Therapeutic Exercise Minutes: 30  02849 - PT Manual Therapy Minutes: 12  Total Minutes  Timed Charges Total Minutes: 42   Total Minutes: 42  Therapy Charges for Today       Code Description Service Date Service Provider Modifiers Qty    81411914087 HC PT THER PROC EA 15 MIN 7/22/2025 Sarmad Banegas, PT GP 2    13256002272 HC PT MANUAL THERAPY EA 15 MIN 7/22/2025 Sarmad Banegas, PT GP 1                      Sarmad Banegas, PT  7/22/2025

## 2025-07-23 ENCOUNTER — APPOINTMENT (OUTPATIENT)
Dept: PHYSICAL THERAPY | Facility: HOSPITAL | Age: 35
End: 2025-07-23
Payer: COMMERCIAL

## 2025-07-23 ENCOUNTER — OFFICE VISIT (OUTPATIENT)
Age: 35
End: 2025-07-23
Payer: COMMERCIAL

## 2025-07-23 VITALS — WEIGHT: 115 LBS | BODY MASS INDEX: 18.05 KG/M2 | RESPIRATION RATE: 18 BRPM | HEIGHT: 67 IN

## 2025-07-23 DIAGNOSIS — R29.898 WEAKNESS OF BOTH HIPS: ICD-10-CM

## 2025-07-23 DIAGNOSIS — K59.09 CHRONIC CONSTIPATION: Primary | ICD-10-CM

## 2025-07-23 DIAGNOSIS — M67.951 TENDINOPATHY OF RIGHT GLUTEUS MEDIUS: ICD-10-CM

## 2025-07-23 DIAGNOSIS — M25.561 BILATERAL ANTERIOR KNEE PAIN: ICD-10-CM

## 2025-07-23 DIAGNOSIS — M25.562 BILATERAL ANTERIOR KNEE PAIN: ICD-10-CM

## 2025-07-23 DIAGNOSIS — N94.10 FEMALE DYSPAREUNIA: ICD-10-CM

## 2025-07-23 PROCEDURE — 99214 OFFICE O/P EST MOD 30 MIN: CPT | Performed by: STUDENT IN AN ORGANIZED HEALTH CARE EDUCATION/TRAINING PROGRAM

## 2025-07-24 ENCOUNTER — HOSPITAL ENCOUNTER (OUTPATIENT)
Dept: PHYSICAL THERAPY | Facility: HOSPITAL | Age: 35
Setting detail: THERAPIES SERIES
Discharge: HOME | End: 2025-07-24
Payer: COMMERCIAL

## 2025-07-24 DIAGNOSIS — S76.311A STRAIN OF RIGHT HAMSTRING MUSCLE, INITIAL ENCOUNTER: Primary | ICD-10-CM

## 2025-07-24 DIAGNOSIS — M25.562 PAIN IN BOTH KNEES, UNSPECIFIED CHRONICITY: ICD-10-CM

## 2025-07-24 DIAGNOSIS — M25.561 PAIN IN BOTH KNEES, UNSPECIFIED CHRONICITY: ICD-10-CM

## 2025-07-24 PROCEDURE — 97140 MANUAL THERAPY 1/> REGIONS: CPT

## 2025-07-24 PROCEDURE — 97110 THERAPEUTIC EXERCISES: CPT

## 2025-07-24 NOTE — THERAPY TREATMENT NOTE
Outpatient Physical Therapy Ortho Treatment Note  [facility]     Patient Name: Jose Wadsworth  : 1990  MRN: [id number]  Today's Date: 2025      Visit Date: 2025    Visit Dx:    ICD-10-CM ICD-9-CM   1. Strain of right hamstring muscle, initial encounter  S76.311A 843.8   2. Pain in both knees, unspecified chronicity  M25.561 719.46    M25.562        Patient Active Problem List   Diagnosis    Palpitations    ECG abnormal    Precordial pain    Moderate persistent asthma without complication    Irritable bowel syndrome with constipation    Anxiety and depression    Allergic rhinitis    Subclinical hypothyroidism    Acute pharyngitis        Past Medical History:   Diagnosis Date    Allergic     Allergic rhinitis     Ankle sprain     Right, resolved    Asthma     Bursitis of hip     Ultrasound from PT    Cervical disc disorder 2018    ACDA 2022    Chronic constipation     Fracture, radius ,     Left - not sure if radius/ulna/both    Fracture, ulna ,     Left - not sure if radius/ulna/both    Heart murmur     Mild    Irritable bowel syndrome 2013    Pneumonia     Subclinical hypothyroidism     Upper respiratory infection     Vitamin B 12 deficiency     Vitamin D deficiency         Past Surgical History:   Procedure Laterality Date    APPENDECTOMY N/A 2016    Dr. Duong Johnson    CERVICAL SPINE ANTERIOR      COLONOSCOPY  2018    Normal    DENTAL PROCEDURE      x 2    ENDOSCOPY  2018    Yeast in esophagus    EYE SURGERY  2016    Lasik    LASIK      LEEP      LEEP PROCEDURE    NECK SURGERY  2022    ACDA    SPINE SURGERY  3/24/22    ACDA at [place]    UPPER GASTROINTESTINAL ENDOSCOPY  18                        PT Assessment/Plan       Row Name 25 0700          PT Assessment    Assessment Comments Jose returns to clinic reporting having new referral placed for pelvic health evaluation, scheduled evaluation for 8/15 but patient is  going to look if she can be seen for this elsewhere sooner. At this time plan to continue scheduled ortho PT appointments. Added static lateral lunges with good form and tolerance, continued with manual as pt. verbalizes greatest relief following as well as discussion of foamroll/tennis ball for self TPR.  -MP        PT Plan    PT Plan Comments stir the pot?  -MP               User Key  (r) = Recorded By, (t) = Taken By, (c) = Cosigned By      Initials Name Provider Type    Carla Tuttle, PT Physical Therapist                       OP Exercises       Row Name 07/24/25 0700             Subjective    Subjective Comments I do have a pelvic health referral  -MP         Subjective Pain    Able to rate subjective pain? yes  -MP      Pre-Treatment Pain Level 4  -MP      Post-Treatment Pain Level 4  -MP         Total Minutes    53682 - PT Therapeutic Exercise Minutes 30  -MP      19405 - PT Manual Therapy Minutes 11  -MP         Exercise 1    Exercise Name 1 RCB  -MP      Cueing 1 Verbal  -MP      Time 1 5 mins  -MP      Additional Comments seat 5  -MP         Exercise 2    Exercise Name 2 supine hamstring 90/90 with dorsiflexion stretch  -MP      Cueing 2 Verbal;Demo  -MP      Sets 2 2  -MP      Reps 2 10e  -MP         Exercise 3    Exercise Name 3 static lateral lunge  -MP      Cueing 3 Verbal;Demo  -MP      Reps 3 10ea  -MP         Exercise 4    Exercise Name 4 bridge with LE over swiss ball  -MP      Cueing 4 Verbal;Demo  -MP      Sets 4 2  -MP      Reps 4 10  -MP      Time 4 green SB  -MP         Exercise 8    Exercise Name 8 supine DKTC HS curls  -MP      Cueing 8 Verbal;Tactile  -MP      Sets 8 2  -MP      Reps 8 12  -MP      Time 8 green SB  -MP      Additional Comments gentle press of heels into ball for HS activation  -MP         Exercise 9    Exercise Name 9 PPT with LE ext  -MP      Cueing 9 Verbal;Demo  -MP      Sets 9 2e  -MP      Reps 9 10 alt  -MP                User Key  (r) = Recorded By, (t) = Taken  By, (c) = Cosigned By      Initials Name Provider Type    MP Carla Gillespie, PT Physical Therapist                             Manual Rx (Last 36 Hours)       Manual Treatments       Row Name 07/24/25 0700             Total Minutes    33012 - PT Manual Therapy Minutes 11  -MP         Manual Rx 1    Manual Rx 1 Location STM to posterior lateral hip girdle/lateral thigh  -MP      Manual Rx 1 Type Patient in left side-lying with pillow between knees  -MP      Manual Rx 1 Grade Use foam roller  -MP                User Key  (r) = Recorded By, (t) = Taken By, (c) = Cosigned By      Initials Name Provider Type    MP Carla Gillespie, PT Physical Therapist                     PT OP Goals       Row Name 07/24/25 0700          PT Short Term Goals    STG Date to Achieve 07/31/25  -MP     STG 1 Pt. will be independent with initial HEP to improve self-management of condition.  -MP     STG 1 Progress Met  -MP     STG 2 Pt. will return to 1 style of dance without increase in pain to allow progression back to all dance styles.  -MP     STG 2 Progress Ongoing  -MP        Long Term Goals    LTG Date to Achieve 08/30/25  -MP     LTG 1 Pt. will be independent with advanced HEP to improve long term management of condition and independence.  -MP     LTG 1 Progress Ongoing  -MP     LTG 2 Pt. will report 50% improvement in overall condition to improve QOL.  -MP     LTG 2 Progress Ongoing  -MP     LTG 3 Pt. will return to full dance activities without pain to allow return to PLOF.  -MP     LTG 3 Progress Ongoing  -MP     LTG 4 Pt. will improve R hip extension strength >/= 4+/5 without pain to indicate improved hip strength.  -MP     LTG 4 Progress Ongoing  -MP               User Key  (r) = Recorded By, (t) = Taken By, (c) = Cosigned By      Initials Name Provider Type    Carla Tuttle, PT Physical Therapist                    Therapy Education  Education Details: use of foam roll/tennis ball for self TPR  Given: Symptoms/condition  management, Posture/body mechanics  Program: Reinforced  How Provided: Verbal, Demonstration  Provided to: Patient  Level of Understanding: Demonstrated, Verbalized              Time Calculation:   Start Time: 0731  Stop Time: 0812  Time Calculation (min): 41 min  Total Timed Code Minutes- PT: 41 minute(s)  Timed Charges  71517 - PT Therapeutic Exercise Minutes: 30  17735 - PT Manual Therapy Minutes: 11  Total Minutes  Timed Charges Total Minutes: 41   Total Minutes: 41  Therapy Charges for Today       Code Description Service Date Service Provider Modifiers Qty    82946593052 HC PT MANUAL THERAPY EA 15 MIN 7/24/2025 Carla Gillespie, PT GP 1    89017691526 HC PT THER PROC EA 15 MIN 7/24/2025 Carla Gillespie, PT GP 2                      Carla Gillespie PT  7/24/2025      No

## 2025-07-25 ENCOUNTER — APPOINTMENT (OUTPATIENT)
Dept: PHYSICAL THERAPY | Facility: HOSPITAL | Age: 35
End: 2025-07-25
Payer: COMMERCIAL

## 2025-07-28 ENCOUNTER — HOSPITAL ENCOUNTER (OUTPATIENT)
Dept: PHYSICAL THERAPY | Facility: HOSPITAL | Age: 35
Setting detail: THERAPIES SERIES
Discharge: HOME | End: 2025-07-28
Payer: COMMERCIAL

## 2025-07-28 DIAGNOSIS — M25.561 PAIN IN BOTH KNEES, UNSPECIFIED CHRONICITY: ICD-10-CM

## 2025-07-28 DIAGNOSIS — S76.311A STRAIN OF RIGHT HAMSTRING MUSCLE, INITIAL ENCOUNTER: Primary | ICD-10-CM

## 2025-07-28 DIAGNOSIS — M25.562 PAIN IN BOTH KNEES, UNSPECIFIED CHRONICITY: ICD-10-CM

## 2025-07-28 PROCEDURE — 97110 THERAPEUTIC EXERCISES: CPT

## 2025-07-28 PROCEDURE — 97140 MANUAL THERAPY 1/> REGIONS: CPT

## 2025-07-28 NOTE — THERAPY TREATMENT NOTE
Outpatient Physical Therapy Ortho Treatment Note  [facility]     Patient Name: Jose Wadsworth  : 1990  MRN: [id number]  Today's Date: 2025      Visit Date: 2025    Visit Dx:    ICD-10-CM ICD-9-CM   1. Strain of right hamstring muscle, initial encounter  S76.311A 843.8   2. Pain in both knees, unspecified chronicity  M25.561 719.46    M25.562        Patient Active Problem List   Diagnosis    Palpitations    ECG abnormal    Precordial pain    Moderate persistent asthma without complication    Irritable bowel syndrome with constipation    Anxiety and depression    Allergic rhinitis    Subclinical hypothyroidism    Acute pharyngitis        Past Medical History:   Diagnosis Date    Allergic     Allergic rhinitis     Ankle sprain 2012    Right, resolved    Asthma     Bursitis of hip     Ultrasound from PT    Cervical disc disorder 2018    [facility] 2022    Chronic constipation     Fracture, radius ,     Left - not sure if radius/ulna/both    Fracture, ulna ,     Left - not sure if radius/ulna/both    Heart murmur     Mild    Irritable bowel syndrome 2013    Pneumonia     Subclinical hypothyroidism     Upper respiratory infection     Vitamin B 12 deficiency     Vitamin D deficiency         Past Surgical History:   Procedure Laterality Date    APPENDECTOMY N/A 2016    Dr. Duong Johnson    CERVICAL SPINE ANTERIOR      COLONOSCOPY  2018    Normal    DENTAL PROCEDURE      x 2    ENDOSCOPY  2018    Yeast in esophagus    EYE SURGERY  2016    Lasik    LASIK      LEEP      LEEP PROCEDURE    NECK SURGERY  2022    [facility]    SPINE SURGERY  3/24/22    [facility] at [place]    UPPER GASTROINTESTINAL ENDOSCOPY  18                        PT Assessment/Plan       Row Name 25 0800          PT Assessment    Assessment Comments Reports slight increase in pain (burning sensation) to R lateral thigh for 2 days following last visit but symptoms then resolved to  baseline. Trialed more dynamic approach for warm up this date including dynamic HS stretch w/ reach, open/close gate, and SL RDL. Ended with manual to R hip, again noted several taut bands with LTR. Pt. Remains appropriate for skilled PT, planning to hold on further visits following next visit until pelvic health evaluation is performed with patient in agreement with plan.  -MP        PT Plan    PT Plan Comments update HEP to be performed until PH eval 8/15  -MP               User Key  (r) = Recorded By, (t) = Taken By, (c) = Cosigned By      Initials Name Provider Type    Carla Tuttle, PT Physical Therapist                       OP Exercises       Row Name 07/28/25 0700             Subjective    Subjective Comments I was pretty irritated Thursday and Friday and then back to baseline but I did a lot of walking on Saturday. It feels like a burning.  -MP         Subjective Pain    Able to rate subjective pain? yes  -MP      Pre-Treatment Pain Level 3  -MP      Post-Treatment Pain Level --  -MP      Subjective Pain Comment did not ask formal pain rating at end of session but patient reports improvement  -MP         Total Minutes    32658 - PT Therapeutic Exercise Minutes 28  -MP      88386 - PT Manual Therapy Minutes 10  -MP         Exercise 1    Exercise Name 1 RCB  -MP      Cueing 1 Verbal  -MP      Time 1 5 mins  -MP      Additional Comments seat n5  -MP         Exercise 2    Exercise Name 2 supine hamstring 90/90 with dorsiflexion stretch  -MP      Cueing 2 Verbal;Demo  -MP      Sets 2 2  -MP      Reps 2 10e  -MP         Exercise 4    Exercise Name 4 bridge with LE over swiss ball  -MP      Cueing 4 Verbal;Demo  -MP      Sets 4 2  -MP      Reps 4 10  -MP      Time 4 green SB  -MP         Exercise 8    Exercise Name 8 supine DKTC HS curls  -MP      Cueing 8 Verbal;Tactile  -MP      Sets 8 2  -MP      Reps 8 10  -MP      Time 8 green SB  -MP         Exercise 11    Exercise Name 11 dynamic HS stretch - walk  with reach  -MP      Cueing 11 Verbal;Demo  -MP      Reps 11 10ea  -MP         Exercise 12    Exercise Name 12 open/close gate walking  -MP      Cueing 12 Verbal;Demo  -MP      Reps 12 10ea  -MP         Exercise 13    Exercise Name 13 SL RDL - fingertip support  -MP      Cueing 13 Verbal;Demo  -MP      Reps 13 10e  -MP                User Key  (r) = Recorded By, (t) = Taken By, (c) = Cosigned By      Initials Name Provider Type    Carla Tuttle, PT Physical Therapist                             Manual Rx (Last 36 Hours)       Manual Treatments       Row Name 07/28/25 0700             Total Minutes    24618 - PT Manual Therapy Minutes 10  -MP         Manual Rx 1    Manual Rx 1 Location STM to posterior lateral hip girdle/lateral thigh  -MP      Manual Rx 1 Type Patient in left side-lying with pillow between knees  -MP      Manual Rx 1 Grade Use foam roller  -MP                User Key  (r) = Recorded By, (t) = Taken By, (c) = Cosigned By      Initials Name Provider Type    Carla Tuttle, PT Physical Therapist                     PT OP Goals       Row Name 07/28/25 0800          PT Short Term Goals    STG Date to Achieve 07/31/25  -MP     STG 1 Pt. will be independent with initial HEP to improve self-management of condition.  -MP     STG 1 Progress Met  -MP     STG 2 Pt. will return to 1 style of dance without increase in pain to allow progression back to all dance styles.  -MP     STG 2 Progress Ongoing  -MP        Long Term Goals    LTG Date to Achieve 08/30/25  -MP     LTG 1 Pt. will be independent with advanced HEP to improve long term management of condition and independence.  -MP     LTG 1 Progress Ongoing  -MP     LTG 2 Pt. will report 50% improvement in overall condition to improve QOL.  -MP     LTG 2 Progress Ongoing  -MP     LTG 3 Pt. will return to full dance activities without pain to allow return to PLOF.  -MP     LTG 3 Progress Ongoing  -MP     LTG 4 Pt. will improve R hip extension strength  >/= 4+/5 without pain to indicate improved hip strength.  -MP     LTG 4 Progress Ongoing  -MP               User Key  (r) = Recorded By, (t) = Taken By, (c) = Cosigned By      Initials Name Provider Type    Carla Tuttle PT Physical Therapist                    Therapy Education  Given: Symptoms/condition management, Posture/body mechanics  Program: Reinforced  How Provided: Verbal, Demonstration  Provided to: Patient  Level of Understanding: Verbalized, Demonstrated              Time Calculation:   Start Time: 0733  Stop Time: 0811  Time Calculation (min): 38 min  Total Timed Code Minutes- PT: 38 minute(s)  Timed Charges  29310 - PT Therapeutic Exercise Minutes: 28  64175 - PT Manual Therapy Minutes: 10  Total Minutes  Timed Charges Total Minutes: 38   Total Minutes: 38  Therapy Charges for Today       Code Description Service Date Service Provider Modifiers Qty    03564822114  PT MANUAL THERAPY EA 15 MIN 7/28/2025 Carla Gillespie, PT GP 1    08862122994 HC PT THER PROC EA 15 MIN 7/28/2025 Carla Gillespie, PT GP 2                      Carla Gillespie PT  7/28/2025

## 2025-07-30 ENCOUNTER — HOSPITAL ENCOUNTER (OUTPATIENT)
Dept: PHYSICAL THERAPY | Facility: HOSPITAL | Age: 35
Setting detail: THERAPIES SERIES
Discharge: HOME | End: 2025-07-30
Payer: COMMERCIAL

## 2025-07-30 DIAGNOSIS — M25.562 PAIN IN BOTH KNEES, UNSPECIFIED CHRONICITY: ICD-10-CM

## 2025-07-30 DIAGNOSIS — M25.561 PAIN IN BOTH KNEES, UNSPECIFIED CHRONICITY: ICD-10-CM

## 2025-07-30 DIAGNOSIS — S76.311A STRAIN OF RIGHT HAMSTRING MUSCLE, INITIAL ENCOUNTER: Primary | ICD-10-CM

## 2025-07-30 PROCEDURE — 97530 THERAPEUTIC ACTIVITIES: CPT | Performed by: PHYSICAL THERAPIST

## 2025-07-30 PROCEDURE — 97110 THERAPEUTIC EXERCISES: CPT | Performed by: PHYSICAL THERAPIST

## 2025-07-30 NOTE — THERAPY TREATMENT NOTE
Outpatient Physical Therapy Ortho Treatment Note  [facility]     Patient Name: Jose Wadsworth  : 1990  MRN: [id number]  Today's Date: 2025      Visit Date: 2025    Visit Dx:    ICD-10-CM ICD-9-CM   1. Strain of right hamstring muscle, initial encounter  S76.311A 843.8   2. Pain in both knees, unspecified chronicity  M25.561 719.46    M25.562        Patient Active Problem List   Diagnosis    Palpitations    ECG abnormal    Precordial pain    Moderate persistent asthma without complication    Irritable bowel syndrome with constipation    Anxiety and depression    Allergic rhinitis    Subclinical hypothyroidism    Acute pharyngitis        Past Medical History:   Diagnosis Date    Allergic     Allergic rhinitis     Ankle sprain     Right, resolved    Asthma     Bursitis of hip     Ultrasound from PT    Cervical disc disorder 2018    [facility]DA 2022    Chronic constipation     Fracture, radius ,     Left - not sure if radius/ulna/both    Fracture, ulna ,     Left - not sure if radius/ulna/both    Heart murmur     Mild    Irritable bowel syndrome 2013    Pneumonia     Subclinical hypothyroidism     Upper respiratory infection     Vitamin B 12 deficiency     Vitamin D deficiency         Past Surgical History:   Procedure Laterality Date    APPENDECTOMY N/A 2016    Dr. Duong Johnson    CERVICAL SPINE ANTERIOR      COLONOSCOPY  2018    Normal    DENTAL PROCEDURE      x 2    ENDOSCOPY  2018    Yeast in esophagus    EYE SURGERY  2016    Lasik    LASIK      LEEP      LEEP PROCEDURE    NECK SURGERY  2022    [facility]    SPINE SURGERY  3/24/22    [facility] at [place]    UPPER GASTROINTESTINAL ENDOSCOPY  18                        PT Assessment/Plan       Row Name 25 0753          PT Assessment    Assessment Comments Ms. Wadsworth returns to the clinic rfor her R lateral hip pain/HS strain. She reports minimal change in her condition to date. She is  scheduled for a PH evaluation.  Reviewed current HEP and re-issued updated copy.  Reviewed ergonomics with sitting.  Discussed self-care techniques related to self trigger point release using tennis balls versus Thera canes.  Encourage patient call with any questions or concerns. We will hold formal orthopedic PT until this time.  She verbalizes understanding of the current plan.  Answered questions.  -GJ        PT Plan    PT Plan Comments hold formal PT at this time pending PH evaluation.  -GJ               User Key  (r) = Recorded By, (t) = Taken By, (c) = Cosigned By      Initials Name Provider Type    Sander Fernandez, PT Physical Therapist                       OP Exercises       Row Name 07/30/25 0748 07/30/25 0700          Subjective    Subjective Comments -- about the same, 2/10 pain in the (R) hip  -GJ        Subjective Pain    Pre-Treatment Pain Level -- 2  -GJ        Total Minutes    78581 - PT Therapeutic Exercise Minutes 30  -GJ --     34033 - PT Therapeutic Activity Minutes 10  -GJ --        Exercise 1    Exercise Name 1 -- RCB  -GJ     Time 1 -- 5 mins  -GJ     Additional Comments -- seat 5  -GJ        Exercise 4    Exercise Name 4 -- bridge with LE over swiss ball  -GJ     Cueing 4 -- Verbal;Demo  -GJ     Sets 4 -- 2  -GJ     Reps 4 -- 10  -GJ     Time 4 -- green SB  -GJ        Exercise 8    Exercise Name 8 -- supine DKTC HS curls  -GJ     Cueing 8 -- Verbal;Tactile  -GJ     Sets 8 -- 2  -GJ     Reps 8 -- 10  -GJ     Time 8 -- green SB  -GJ        Exercise 11    Exercise Name 11 -- dynamic HS stretch - walk with reach  -GJ     Cueing 11 -- Verbal;Demo  -GJ     Reps 11 -- 1 lap (10 ft, down and back)  -GJ        Exercise 12    Exercise Name 12 -- open/close gate walking  -GJ     Cueing 12 -- Verbal;Demo  -GJ     Reps 12 -- 1 lap (10 ft, down and back)  -GJ        Exercise 13    Exercise Name 13 -- SL RDL - fingertip support  -GJ     Cueing 13 -- Verbal;Demo  -GJ     Reps 13 -- 10e  -GJ                User Key  (r) = Recorded By, (t) = Taken By, (c) = Cosigned By      Initials Name Provider Type    Sander Fernandez, PT Physical Therapist                                  PT OP Goals       Row Name 07/30/25 0700          PT Short Term Goals    STG Date to Achieve 07/31/25  -GJ     STG 1 Pt. will be independent with initial HEP to improve self-management of condition.  -GJ     STG 1 Progress Met  -GJ     STG 2 Pt. will return to 1 style of dance without increase in pain to allow progression back to all dance styles.  -GJ     STG 2 Progress Ongoing  -GJ        Long Term Goals    LTG Date to Achieve 08/30/25  -GJ     LTG 1 Pt. will be independent with advanced HEP to improve long term management of condition and independence.  -GJ     LTG 1 Progress Ongoing  -GJ     LTG 2 Pt. will report 50% improvement in overall condition to improve QOL.  -GJ     LTG 2 Progress Ongoing  -GJ     LTG 3 Pt. will return to full dance activities without pain to allow return to PLOF.  -GJ     LTG 3 Progress Ongoing  -GJ     LTG 4 Pt. will improve R hip extension strength >/= 4+/5 without pain to indicate improved hip strength.  -GJ     LTG 4 Progress Ongoing  -GJ               User Key  (r) = Recorded By, (t) = Taken By, (c) = Cosigned By      Initials Name Provider Type    Sander Fernandez, PT Physical Therapist                    Therapy Education  Education Details: ROSARIO, reviewed activity modifications, discussed the value of massage.  Discussed ergonomics with sitting.  Discussed self trigger point release techniques with tennis ball versus Thera cane.  Encourage patient to call with any questions or concerns  Given: HEP, Symptoms/condition management, Pain management, Posture/body mechanics, Fall prevention and home safety, Mobility training  Program: Reinforced  How Provided: Verbal, Demonstration, Written  Provided to: Patient  Level of Understanding: Teach back education performed, Verbalized, Demonstrated               Time Calculation:   Start Time: 0748  Stop Time: 0828  Time Calculation (min): 40 min  Timed Charges  39157 - PT Therapeutic Exercise Minutes: 30  95723 - PT Therapeutic Activity Minutes: 10  Total Minutes  Timed Charges Total Minutes: 40   Total Minutes: 40  Therapy Charges for Today       Code Description Service Date Service Provider Modifiers Qty    48799374894 HC PT THER PROC EA 15 MIN 7/30/2025 Sander Pratt, PT GP 2    [id number]  PT THERAPEUTIC ACT EA 15 MIN 7/30/2025 Sander Pratt, PT GP 1                      Sander Pratt, PT  7/30/2025

## 2025-08-20 ENCOUNTER — TELEPHONE (OUTPATIENT)
Dept: ORTHOPEDIC SURGERY | Facility: CLINIC | Age: 35
End: 2025-08-20
Payer: COMMERCIAL